# Patient Record
Sex: MALE | Race: WHITE | NOT HISPANIC OR LATINO
[De-identification: names, ages, dates, MRNs, and addresses within clinical notes are randomized per-mention and may not be internally consistent; named-entity substitution may affect disease eponyms.]

---

## 2019-08-13 PROBLEM — Z00.00 ENCOUNTER FOR PREVENTIVE HEALTH EXAMINATION: Status: ACTIVE | Noted: 2019-08-13

## 2019-08-20 ENCOUNTER — APPOINTMENT (OUTPATIENT)
Dept: OTOLARYNGOLOGY | Facility: CLINIC | Age: 74
End: 2019-08-20
Payer: MEDICARE

## 2019-08-20 VITALS
WEIGHT: 200 LBS | RESPIRATION RATE: 16 BRPM | SYSTOLIC BLOOD PRESSURE: 135 MMHG | DIASTOLIC BLOOD PRESSURE: 77 MMHG | HEART RATE: 97 BPM | HEIGHT: 70 IN | BODY MASS INDEX: 28.63 KG/M2

## 2019-08-20 DIAGNOSIS — Z80.0 FAMILY HISTORY OF MALIGNANT NEOPLASM OF DIGESTIVE ORGANS: ICD-10-CM

## 2019-08-20 DIAGNOSIS — Z80.1 FAMILY HISTORY OF MALIGNANT NEOPLASM OF TRACHEA, BRONCHUS AND LUNG: ICD-10-CM

## 2019-08-20 DIAGNOSIS — R01.1 CARDIAC MURMUR, UNSPECIFIED: ICD-10-CM

## 2019-08-20 DIAGNOSIS — I10 ESSENTIAL (PRIMARY) HYPERTENSION: ICD-10-CM

## 2019-08-20 DIAGNOSIS — J38.01 PARALYSIS OF VOCAL CORDS AND LARYNX, UNILATERAL: ICD-10-CM

## 2019-08-20 DIAGNOSIS — Z78.9 OTHER SPECIFIED HEALTH STATUS: ICD-10-CM

## 2019-08-20 DIAGNOSIS — E78.00 PURE HYPERCHOLESTEROLEMIA, UNSPECIFIED: ICD-10-CM

## 2019-08-20 PROCEDURE — 99204 OFFICE O/P NEW MOD 45 MIN: CPT | Mod: 25

## 2019-08-20 PROCEDURE — 31575 DIAGNOSTIC LARYNGOSCOPY: CPT

## 2019-08-20 NOTE — HISTORY OF PRESENT ILLNESS
[None] : The patient is currently asymptomatic. [de-identified] : Mr. Landon presents reporting increased reflux around April 2019.  He reports that he started getting hoarse since.  After waiting for 4 months and realizing that there were no improvement, he saw Dr. Pipo Leon.  He later reported increased mucus production and difficulty breathing with increased humidity.  He also reports some streaks of blood on his sputum.  He reports that under endoscopic evaluation, he was found that one of his vocal cords was not moving well.  A CT was ordered on October 6, 109 which noted a 2.8cm transglottic mass around the right larynx.  He denies ever smoking but reports second hand smoking.  He notes a soreness in his throat, denies dysphagia, some shortness of breath with humidity.  Pt here for evaluation. is suggestion of extralaryngeal disease ext on CT  is noting increase resp difficulty.  Pt states no issue w eating.  pt is a never smoker w long hx GERD.  no hx heasrt diseae but ?? valve issue. is able to sleep 6 hours.   [Neck Mass] : no neck mass [Difficulty Swallowing] : no difficulty swallowing [Painful Swallowing] : no painful swallowing

## 2019-08-20 NOTE — CONSULT LETTER
[Dear  ___] : Dear  [unfilled], [Consult Letter:] : I had the pleasure of evaluating your patient, [unfilled]. [Please see my note below.] : Please see my note below. [Consult Closing:] : Thank you very much for allowing me to participate in the care of this patient.  If you have any questions, please do not hesitate to contact me. [Sincerely,] : Sincerely, [FreeTextEntry3] : Kel Johnston MD [FreeTextEntry2] : Pipo Leon MD ([place], [place])\par

## 2019-08-20 NOTE — PHYSICAL EXAM
[FreeTextEntry1] : ant R midline neck full and sl tender.  no skin change [Midline] : trachea located in midline position [Normal] : no rashes

## 2019-08-20 NOTE — PROCEDURE
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  Pt has marginal 3-4mm airway but adequate for now. the tumor sems to cross the AC. He has mildly noisy breathing

## 2019-08-21 ENCOUNTER — FORM ENCOUNTER (OUTPATIENT)
Age: 74
End: 2019-08-21

## 2019-08-21 ENCOUNTER — CHART COPY (OUTPATIENT)
Age: 74
End: 2019-08-21

## 2019-08-22 ENCOUNTER — APPOINTMENT (OUTPATIENT)
Dept: NUCLEAR MEDICINE | Facility: IMAGING CENTER | Age: 74
End: 2019-08-22
Payer: MEDICARE

## 2019-08-22 ENCOUNTER — OUTPATIENT (OUTPATIENT)
Dept: OUTPATIENT SERVICES | Facility: HOSPITAL | Age: 74
LOS: 1 days | End: 2019-08-22
Payer: MEDICARE

## 2019-08-22 ENCOUNTER — FORM ENCOUNTER (OUTPATIENT)
Age: 74
End: 2019-08-22

## 2019-08-22 DIAGNOSIS — C32.9 MALIGNANT NEOPLASM OF LARYNX, UNSPECIFIED: ICD-10-CM

## 2019-08-22 PROCEDURE — 78815 PET IMAGE W/CT SKULL-THIGH: CPT | Mod: 26,PI

## 2019-08-22 PROCEDURE — A9552: CPT

## 2019-08-22 PROCEDURE — 78815 PET IMAGE W/CT SKULL-THIGH: CPT

## 2019-08-23 ENCOUNTER — OUTPATIENT (OUTPATIENT)
Dept: OUTPATIENT SERVICES | Facility: HOSPITAL | Age: 74
LOS: 1 days | End: 2019-08-23
Payer: MEDICARE

## 2019-08-23 ENCOUNTER — APPOINTMENT (OUTPATIENT)
Dept: MRI IMAGING | Facility: IMAGING CENTER | Age: 74
End: 2019-08-23
Payer: MEDICARE

## 2019-08-23 DIAGNOSIS — C32.9 MALIGNANT NEOPLASM OF LARYNX, UNSPECIFIED: ICD-10-CM

## 2019-08-23 PROCEDURE — 70543 MRI ORBT/FAC/NCK W/O &W/DYE: CPT | Mod: 26

## 2019-08-23 PROCEDURE — 70543 MRI ORBT/FAC/NCK W/O &W/DYE: CPT

## 2019-08-23 PROCEDURE — A9585: CPT

## 2019-08-25 ENCOUNTER — TRANSCRIPTION ENCOUNTER (OUTPATIENT)
Age: 74
End: 2019-08-25

## 2019-08-26 ENCOUNTER — OUTPATIENT (OUTPATIENT)
Dept: OUTPATIENT SERVICES | Facility: HOSPITAL | Age: 74
LOS: 1 days | Discharge: HOME | End: 2019-08-26

## 2019-08-26 ENCOUNTER — FORM ENCOUNTER (OUTPATIENT)
Age: 74
End: 2019-08-26

## 2019-08-27 ENCOUNTER — OTHER (OUTPATIENT)
Age: 74
End: 2019-08-27

## 2019-08-27 ENCOUNTER — RESULT REVIEW (OUTPATIENT)
Age: 74
End: 2019-08-27

## 2019-08-27 ENCOUNTER — APPOINTMENT (OUTPATIENT)
Dept: ULTRASOUND IMAGING | Facility: IMAGING CENTER | Age: 74
End: 2019-08-27
Payer: MEDICARE

## 2019-08-27 ENCOUNTER — OUTPATIENT (OUTPATIENT)
Dept: OUTPATIENT SERVICES | Facility: HOSPITAL | Age: 74
LOS: 1 days | End: 2019-08-27
Payer: MEDICARE

## 2019-08-27 ENCOUNTER — APPOINTMENT (OUTPATIENT)
Dept: RADIATION ONCOLOGY | Facility: CLINIC | Age: 74
End: 2019-08-27
Payer: MEDICARE

## 2019-08-27 VITALS
SYSTOLIC BLOOD PRESSURE: 158 MMHG | WEIGHT: 198.97 LBS | RESPIRATION RATE: 16 BRPM | BODY MASS INDEX: 28.48 KG/M2 | TEMPERATURE: 98.06 F | DIASTOLIC BLOOD PRESSURE: 84 MMHG | OXYGEN SATURATION: 98 % | HEART RATE: 102 BPM | HEIGHT: 70 IN

## 2019-08-27 DIAGNOSIS — C32.9 MALIGNANT NEOPLASM OF LARYNX, UNSPECIFIED: ICD-10-CM

## 2019-08-27 DIAGNOSIS — Z80.41 FAMILY HISTORY OF MALIGNANT NEOPLASM OF OVARY: ICD-10-CM

## 2019-08-27 PROCEDURE — 10006 FNA BX W/US GDN EA ADDL: CPT

## 2019-08-27 PROCEDURE — 88173 CYTOPATH EVAL FNA REPORT: CPT

## 2019-08-27 PROCEDURE — 88172 CYTP DX EVAL FNA 1ST EA SITE: CPT

## 2019-08-27 PROCEDURE — 88305 TISSUE EXAM BY PATHOLOGIST: CPT

## 2019-08-27 PROCEDURE — 88173 CYTOPATH EVAL FNA REPORT: CPT | Mod: 26

## 2019-08-27 PROCEDURE — 99204 OFFICE O/P NEW MOD 45 MIN: CPT | Mod: 25,GC

## 2019-08-27 PROCEDURE — 10005 FNA BX W/US GDN 1ST LES: CPT

## 2019-08-27 PROCEDURE — 88305 TISSUE EXAM BY PATHOLOGIST: CPT | Mod: 26

## 2019-08-27 NOTE — VITALS
[Maximal Pain Intensity: 2/10] : 2/10 [Least Pain Intensity: 0/10] : 0/10 [Pain Location: ___] : Pain Location: [unfilled] [NoTreatment Scheduled] : no treatment scheduled [80: Normal activity with effort; some signs or symptoms of disease.] : 80: Normal activity with effort; some signs or symptoms of disease.  [ECOG Performance Status: 1 - Restricted in physically strenuous activity but ambulatory and able to carry out work of a light or sedentary nature] : Performance Status: 1 - Restricted in physically strenuous activity but ambulatory and able to carry out work of a light or sedentary nature, e.g., light house work, office work

## 2019-08-28 ENCOUNTER — OUTPATIENT (OUTPATIENT)
Dept: OUTPATIENT SERVICES | Facility: HOSPITAL | Age: 74
LOS: 1 days | End: 2019-08-28
Payer: MEDICARE

## 2019-08-28 VITALS
DIASTOLIC BLOOD PRESSURE: 84 MMHG | TEMPERATURE: 99 F | HEART RATE: 96 BPM | RESPIRATION RATE: 18 BRPM | WEIGHT: 199.96 LBS | SYSTOLIC BLOOD PRESSURE: 130 MMHG | HEIGHT: 70 IN

## 2019-08-28 DIAGNOSIS — C32.9 MALIGNANT NEOPLASM OF LARYNX, UNSPECIFIED: ICD-10-CM

## 2019-08-28 LAB
ALBUMIN SERPL ELPH-MCNC: 4.3 G/DL — SIGNIFICANT CHANGE UP (ref 3.3–5)
ALP SERPL-CCNC: 85 U/L — SIGNIFICANT CHANGE UP (ref 40–120)
ALT FLD-CCNC: 9 U/L — SIGNIFICANT CHANGE UP (ref 4–41)
ANION GAP SERPL CALC-SCNC: 13 MMO/L — SIGNIFICANT CHANGE UP (ref 7–14)
AST SERPL-CCNC: 20 U/L — SIGNIFICANT CHANGE UP (ref 4–40)
BILIRUB SERPL-MCNC: 0.5 MG/DL — SIGNIFICANT CHANGE UP (ref 0.2–1.2)
BLD GP AB SCN SERPL QL: NEGATIVE — SIGNIFICANT CHANGE UP
BUN SERPL-MCNC: 18 MG/DL — SIGNIFICANT CHANGE UP (ref 7–23)
CALCIUM SERPL-MCNC: 10.2 MG/DL — SIGNIFICANT CHANGE UP (ref 8.4–10.5)
CHLORIDE SERPL-SCNC: 95 MMOL/L — LOW (ref 98–107)
CO2 SERPL-SCNC: 32 MMOL/L — HIGH (ref 22–31)
CREAT SERPL-MCNC: 0.98 MG/DL — SIGNIFICANT CHANGE UP (ref 0.5–1.3)
GLUCOSE SERPL-MCNC: 91 MG/DL — SIGNIFICANT CHANGE UP (ref 70–99)
HCT VFR BLD CALC: 45.5 % — SIGNIFICANT CHANGE UP (ref 39–50)
HGB BLD-MCNC: 14.7 G/DL — SIGNIFICANT CHANGE UP (ref 13–17)
MCHC RBC-ENTMCNC: 28.7 PG — SIGNIFICANT CHANGE UP (ref 27–34)
MCHC RBC-ENTMCNC: 32.3 % — SIGNIFICANT CHANGE UP (ref 32–36)
MCV RBC AUTO: 88.7 FL — SIGNIFICANT CHANGE UP (ref 80–100)
NON-GYNECOLOGICAL CYTOLOGY STUDY: SIGNIFICANT CHANGE UP
NON-GYNECOLOGICAL CYTOLOGY STUDY: SIGNIFICANT CHANGE UP
NRBC # FLD: 0 K/UL — SIGNIFICANT CHANGE UP (ref 0–0)
PLATELET # BLD AUTO: 311 K/UL — SIGNIFICANT CHANGE UP (ref 150–400)
PMV BLD: 10 FL — SIGNIFICANT CHANGE UP (ref 7–13)
POTASSIUM SERPL-MCNC: 3.8 MMOL/L — SIGNIFICANT CHANGE UP (ref 3.5–5.3)
POTASSIUM SERPL-SCNC: 3.8 MMOL/L — SIGNIFICANT CHANGE UP (ref 3.5–5.3)
PROT SERPL-MCNC: 7.9 G/DL — SIGNIFICANT CHANGE UP (ref 6–8.3)
RBC # BLD: 5.13 M/UL — SIGNIFICANT CHANGE UP (ref 4.2–5.8)
RBC # FLD: 12.1 % — SIGNIFICANT CHANGE UP (ref 10.3–14.5)
RH IG SCN BLD-IMP: NEGATIVE — SIGNIFICANT CHANGE UP
SODIUM SERPL-SCNC: 140 MMOL/L — SIGNIFICANT CHANGE UP (ref 135–145)
WBC # BLD: 7.54 K/UL — SIGNIFICANT CHANGE UP (ref 3.8–10.5)
WBC # FLD AUTO: 7.54 K/UL — SIGNIFICANT CHANGE UP (ref 3.8–10.5)

## 2019-08-28 PROCEDURE — 93010 ELECTROCARDIOGRAM REPORT: CPT

## 2019-08-28 NOTE — H&P PST ADULT - NSANTHOSAYNRD_GEN_A_CORE
No. ADRIANNE screening performed.  STOP BANG Legend: 0-2 = LOW Risk; 3-4 = INTERMEDIATE Risk; 5-8 = HIGH Risk

## 2019-08-28 NOTE — H&P PST ADULT - NSICDXFAMILYHX_GEN_ALL_CORE_FT
FAMILY HISTORY:  Family history of gynecological problem, cancer-sister  FH: colon cancer, brother  FH: lung cancer, father

## 2019-08-28 NOTE — H&P PST ADULT - NSICDXPASTMEDICALHX_GEN_ALL_CORE_FT
PAST MEDICAL HISTORY:  HTN (hypertension)     Hyperlipidemia, mild     Infectious hepatitis 1958    Laryngeal cancer

## 2019-08-28 NOTE — H&P PST ADULT - ACTIVITY
utilizes stairs in the home daily, pt. has periodic SOB with climbing stairs since throat abnormality

## 2019-08-28 NOTE — H&P PST ADULT - HISTORY OF PRESENT ILLNESS
Pt. is a 75 yo male that had laryngitis April 2019.  The hoarseness worsened.  Pt. had diagnostic testing and biopsy revealed laryngeal cancer.

## 2019-08-28 NOTE — H&P PST ADULT - PSYCHIATRIC
This writer attempted to contact Albertina on 08/23/17      Reason for call schedule appt and left message to return call.      When patient calls back, please schedule Office Visit appointment within 1 month with PCP, document that pt called and close encounter .          Selene Ruiz MA     negative Affect and characteristics of appearance, verbalizations, behaviors are appropriate

## 2019-08-28 NOTE — H&P PST ADULT - NSICDXPROBLEM_GEN_ALL_CORE_FT
PROBLEM DIAGNOSES  Problem: Laryngeal cancer  Assessment and Plan: Pt. is scheduled for a total laryngectomy without dissection, STACEY neck dissection, total thyroidectomy...9/5/19.  Pt. verbalized understanding of instructions as discussed and outlined on the instruction sheets.  Pt. did teach back on Chlorhexidine wash.  Pt. to have medical clearance 9/3/19 as requested by the Surgeon.  Will request last cardiac note. PROBLEM DIAGNOSES  Problem: Laryngeal cancer  Assessment and Plan: Pt. is scheduled for a total laryngectomy without dissection, STACEY neck dissection, total thyroidectomy...9/5/19.  Pt. verbalized understanding of instructions as discussed and outlined on the instruction sheets.  Pt. did teach back on Chlorhexidine wash.  Pt. to have medical clearance 9/3/19 as requested by the Surgeon.  Has BAL.  Will request last cardiac note.

## 2019-08-28 NOTE — H&P PST ADULT - ASSESSMENT
Left message for patient to call back at [phone].     Pt. is a 75 yo male accompanied by his son.  Pt. has laryngeal cancer.

## 2019-08-29 PROBLEM — C32.9 MALIGNANT NEOPLASM OF LARYNX, UNSPECIFIED: Chronic | Status: ACTIVE | Noted: 2019-08-28

## 2019-08-29 PROBLEM — I10 ESSENTIAL (PRIMARY) HYPERTENSION: Chronic | Status: ACTIVE | Noted: 2019-08-28

## 2019-08-29 PROBLEM — E78.5 HYPERLIPIDEMIA, UNSPECIFIED: Chronic | Status: ACTIVE | Noted: 2019-08-28

## 2019-08-29 PROBLEM — B15.9 HEPATITIS A WITHOUT HEPATIC COMA: Chronic | Status: ACTIVE | Noted: 2019-08-28

## 2019-08-30 ENCOUNTER — APPOINTMENT (OUTPATIENT)
Dept: OTOLARYNGOLOGY | Facility: CLINIC | Age: 74
End: 2019-08-30
Payer: MEDICARE

## 2019-08-30 ENCOUNTER — APPOINTMENT (OUTPATIENT)
Dept: SPEECH THERAPY | Facility: CLINIC | Age: 74
End: 2019-08-30
Payer: MEDICARE

## 2019-08-30 VITALS
DIASTOLIC BLOOD PRESSURE: 80 MMHG | BODY MASS INDEX: 28.35 KG/M2 | SYSTOLIC BLOOD PRESSURE: 156 MMHG | HEIGHT: 70 IN | WEIGHT: 198 LBS | HEART RATE: 105 BPM

## 2019-08-30 PROCEDURE — 99214 OFFICE O/P EST MOD 30 MIN: CPT | Mod: 25

## 2019-08-30 PROCEDURE — 92524 BEHAVRAL QUALIT ANALYS VOICE: CPT | Mod: NC,GN

## 2019-08-30 PROCEDURE — 31575 DIAGNOSTIC LARYNGOSCOPY: CPT

## 2019-08-30 NOTE — PHYSICAL EXAM
[Sclera] : the sclera and conjunctiva were normal [Extraocular Movements] : extraocular movements were intact [Outer Ear] : the ears and nose were normal in appearance [Hearing Threshold Finger Rub Not Gentry] : hearing was normal [Examination Of The Oral Cavity] : the lips and gums were normal [Exaggerated Use Of Accessory Muscles For Inspiration] : no accessory muscle use [Cervical Lymph Nodes Enlarged Posterior Bilaterally] : posterior cervical [Cervical Lymph Nodes Enlarged Anterior Bilaterally] : anterior cervical [Supraclavicular Lymph Nodes Enlarged Bilaterally] : supraclavicular [Axillary Lymph Nodes Enlarged Bilaterally] : axillary [Normal] : oriented to person, place and time, the affect was normal, the mood was normal and not anxious [de-identified] : expiratory stridor

## 2019-08-30 NOTE — HISTORY OF PRESENT ILLNESS
[FreeTextEntry1] : 74 year old pt w large R transglottic tumor w partial airway obx and likely extralaryngeal spread.\par \par On 8/22/19 PET showed\par \par 1. Large hypermetabolic transglottic mass centered in right larynx with \par extension across the midline. There is extralaryngeal extension involving \par the bilateral strap muscles. \par \par 2. Asymmetric hypermetabolism in cricoarytenoid region, left greater than \par right, is indeterminate, possibly physiologic. \par \par 3 Small hypermetabolic lymph nodes in right level III and IV cervical lymph \par nodes are compatible with metastatic disease. A tiny left level III lymph \par node is indeterminate. Further evaluation may be performed with ultrasound \par guided percutaneous needle biopsy. \par \par 4. No evidence of distant disease. \par \par Father had lung cancer.  Sister had ovarian cancer. Brother had lung cancer.  The patient is a never smoker and only social drinker. \par \par Hoarseness progressively worse since April.  \par

## 2019-08-30 NOTE — END OF VISIT
[] : Resident [FreeTextEntry3] : Agree with assessment and plan. Recommended surgery followed by adjuvant RT/CRT. Procedure and side effects explained in detail. Side effects include but not limited to radiation necrosis, PEG dependence, skin and cartilage damage, dry mouth, loss of taste.  [>50% of Time Spent on Counseling and Coordination of Care for  ___] : Greater than 50% of the encounter time was spent on counseling and coordination of care for [unfilled] [Time Spent: ___ minutes] : I have spent [unfilled] minutes of face to face time with the patient

## 2019-09-01 NOTE — PHYSICAL EXAM
[Midline] : trachea located in midline position [Normal] : no rashes [de-identified] : Anterior and R lateral neck bruising from FNA.

## 2019-09-01 NOTE — HISTORY OF PRESENT ILLNESS
[de-identified] : pt w large R transglottic tumor w partial airway obx and extralaryngeal spread. Here for consultation prior surgery. pt already had all the necessary work up and surgery is schedule on 9/5/2019. PET CT did not show signs of distant metastatic disease. R neck node FNA was positive for cancer.

## 2019-09-01 NOTE — DATA REVIEWED
[de-identified] : There is an enhancing 4.4 x 4 x 4.6 cm, (AP, TR, CC) mass involving the right aryepiglottic fold, right piriform sinus, right greater than left false and true cords, with extra lateral extension into the anterior strap muscles and thyroid with 1.1 cm subglottic extension concerning neoplasm as detailed above. \par Lymph nodes in III cervical chains, although nodes are less than 1.5 cm is in short axis, the nodes are slightly clustered and heterogeneous and enhancement, metastatic adenopathy is not excluded, please see PET/CT for full description of scottie findings. [de-identified] : pet ct\par 1. Large hypermetabolic transglottic mass centered in right larynx with extension across the midline. There is extralaryngeal extension involving the bilateral strap muscles. \par 2. Asymmetric hypermetabolism in cricoarytenoid region, left greater than right, is indeterminate, possibly physiologic. \par 3 Small hypermetabolic lymph nodes in right level III and IV cervical lymph nodes are compatible with metastatic disease. A tiny left level III lymph node is indeterminate. Further evaluation may be performed with ultrasound guided percutaneous needle biopsy. \par 4. No evidence of distant disease.

## 2019-09-03 ENCOUNTER — APPOINTMENT (OUTPATIENT)
Dept: OTOLARYNGOLOGY | Facility: CLINIC | Age: 74
End: 2019-09-03
Payer: MEDICARE

## 2019-09-03 VITALS
BODY MASS INDEX: 28.35 KG/M2 | WEIGHT: 198 LBS | SYSTOLIC BLOOD PRESSURE: 151 MMHG | DIASTOLIC BLOOD PRESSURE: 75 MMHG | HEART RATE: 88 BPM | HEIGHT: 70 IN | RESPIRATION RATE: 16 BRPM

## 2019-09-03 PROCEDURE — 99214 OFFICE O/P EST MOD 30 MIN: CPT

## 2019-09-03 NOTE — REVIEW OF SYSTEMS
[Hoarseness] : hoarseness [Throat Clearing] : throat clearing [As Noted in HPI] : as noted in HPI [Negative] : Heme/Lymph

## 2019-09-03 NOTE — CONSULT LETTER
[Dear  ___] : Dear  [unfilled], [Courtesy Letter:] : I had the pleasure of seeing your patient, [unfilled], in my office today. [Please see my note below.] : Please see my note below. [FreeTextEntry2] : Pipo Leon MD ([place], [place]) [Sincerely,] : Sincerely, [FreeTextEntry3] : Kel Johnston MD

## 2019-09-03 NOTE — HISTORY OF PRESENT ILLNESS
[None] : The patient is currently asymptomatic. [Difficulty Swallowing] : difficulty swallowing [de-identified] : Mr. Landon presents for pre-surgical consultation for large right transglottic tumor with partial airway obstruction.  He is doing well and is scheduled for surgery in the next few days.   [Neck Mass] : no neck mass [Painful Swallowing] : no painful swallowing

## 2019-09-04 ENCOUNTER — TRANSCRIPTION ENCOUNTER (OUTPATIENT)
Age: 74
End: 2019-09-04

## 2019-09-04 ENCOUNTER — INPATIENT (INPATIENT)
Facility: HOSPITAL | Age: 74
LOS: 12 days | Discharge: HOME HEALTH | End: 2019-09-17
Attending: OTOLARYNGOLOGY | Admitting: OTOLARYNGOLOGY
Payer: MEDICARE

## 2019-09-04 VITALS
DIASTOLIC BLOOD PRESSURE: 85 MMHG | SYSTOLIC BLOOD PRESSURE: 150 MMHG | RESPIRATION RATE: 18 BRPM | TEMPERATURE: 99 F | HEART RATE: 85 BPM | OXYGEN SATURATION: 98 %

## 2019-09-04 DIAGNOSIS — C32.9 MALIGNANT NEOPLASM OF LARYNX, UNSPECIFIED: ICD-10-CM

## 2019-09-04 LAB
ALBUMIN SERPL ELPH-MCNC: 4.2 G/DL — SIGNIFICANT CHANGE UP (ref 3.3–5)
ALP SERPL-CCNC: 79 U/L — SIGNIFICANT CHANGE UP (ref 40–120)
ALT FLD-CCNC: 9 U/L — SIGNIFICANT CHANGE UP (ref 4–41)
ANION GAP SERPL CALC-SCNC: 10 MMO/L — SIGNIFICANT CHANGE UP (ref 7–14)
APTT BLD: 39.9 SEC — HIGH (ref 27.5–36.3)
AST SERPL-CCNC: 20 U/L — SIGNIFICANT CHANGE UP (ref 4–40)
BASE EXCESS BLDV CALC-SCNC: 8.5 MMOL/L — SIGNIFICANT CHANGE UP
BASOPHILS # BLD AUTO: 0.05 K/UL — SIGNIFICANT CHANGE UP (ref 0–0.2)
BASOPHILS NFR BLD AUTO: 0.7 % — SIGNIFICANT CHANGE UP (ref 0–2)
BILIRUB SERPL-MCNC: 0.4 MG/DL — SIGNIFICANT CHANGE UP (ref 0.2–1.2)
BLD GP AB SCN SERPL QL: NEGATIVE — SIGNIFICANT CHANGE UP
BLOOD GAS VENOUS - CREATININE: SIGNIFICANT CHANGE UP MG/DL (ref 0.5–1.3)
BLOOD GAS VENOUS - FIO2: 21 — SIGNIFICANT CHANGE UP
BUN SERPL-MCNC: 13 MG/DL — SIGNIFICANT CHANGE UP (ref 7–23)
CALCIUM SERPL-MCNC: 9.5 MG/DL — SIGNIFICANT CHANGE UP (ref 8.4–10.5)
CHLORIDE BLDV-SCNC: 101 MMOL/L — SIGNIFICANT CHANGE UP (ref 96–108)
CHLORIDE SERPL-SCNC: 100 MMOL/L — SIGNIFICANT CHANGE UP (ref 98–107)
CO2 SERPL-SCNC: 31 MMOL/L — SIGNIFICANT CHANGE UP (ref 22–31)
CREAT SERPL-MCNC: 0.83 MG/DL — SIGNIFICANT CHANGE UP (ref 0.5–1.3)
EOSINOPHIL # BLD AUTO: 0.08 K/UL — SIGNIFICANT CHANGE UP (ref 0–0.5)
EOSINOPHIL NFR BLD AUTO: 1.1 % — SIGNIFICANT CHANGE UP (ref 0–6)
GAS PNL BLDV: 135 MMOL/L — LOW (ref 136–146)
GLUCOSE BLDV-MCNC: 122 MG/DL — HIGH (ref 70–99)
GLUCOSE SERPL-MCNC: 131 MG/DL — HIGH (ref 70–99)
HCO3 BLDV-SCNC: 30 MMOL/L — HIGH (ref 20–27)
HCT VFR BLD CALC: 43.7 % — SIGNIFICANT CHANGE UP (ref 39–50)
HCT VFR BLDV CALC: 45.6 % — SIGNIFICANT CHANGE UP (ref 39–51)
HGB BLD-MCNC: 14.1 G/DL — SIGNIFICANT CHANGE UP (ref 13–17)
HGB BLDV-MCNC: 14.9 G/DL — SIGNIFICANT CHANGE UP (ref 13–17)
IMM GRANULOCYTES NFR BLD AUTO: 0.4 % — SIGNIFICANT CHANGE UP (ref 0–1.5)
INR BLD: 1.14 — SIGNIFICANT CHANGE UP (ref 0.88–1.17)
LACTATE BLDV-MCNC: 1.1 MMOL/L — SIGNIFICANT CHANGE UP (ref 0.5–2)
LYMPHOCYTES # BLD AUTO: 1.19 K/UL — SIGNIFICANT CHANGE UP (ref 1–3.3)
LYMPHOCYTES # BLD AUTO: 16.1 % — SIGNIFICANT CHANGE UP (ref 13–44)
MCHC RBC-ENTMCNC: 28.8 PG — SIGNIFICANT CHANGE UP (ref 27–34)
MCHC RBC-ENTMCNC: 32.3 % — SIGNIFICANT CHANGE UP (ref 32–36)
MCV RBC AUTO: 89.4 FL — SIGNIFICANT CHANGE UP (ref 80–100)
MONOCYTES # BLD AUTO: 0.61 K/UL — SIGNIFICANT CHANGE UP (ref 0–0.9)
MONOCYTES NFR BLD AUTO: 8.2 % — SIGNIFICANT CHANGE UP (ref 2–14)
NEUTROPHILS # BLD AUTO: 5.45 K/UL — SIGNIFICANT CHANGE UP (ref 1.8–7.4)
NEUTROPHILS NFR BLD AUTO: 73.5 % — SIGNIFICANT CHANGE UP (ref 43–77)
NRBC # FLD: 0.02 K/UL — SIGNIFICANT CHANGE UP (ref 0–0)
PCO2 BLDV: 61 MMHG — HIGH (ref 41–51)
PH BLDV: 7.36 PH — SIGNIFICANT CHANGE UP (ref 7.32–7.43)
PLATELET # BLD AUTO: 264 K/UL — SIGNIFICANT CHANGE UP (ref 150–400)
PMV BLD: 9.1 FL — SIGNIFICANT CHANGE UP (ref 7–13)
PO2 BLDV: 31 MMHG — LOW (ref 35–40)
POTASSIUM BLDV-SCNC: 3.8 MMOL/L — SIGNIFICANT CHANGE UP (ref 3.4–4.5)
POTASSIUM SERPL-MCNC: 4 MMOL/L — SIGNIFICANT CHANGE UP (ref 3.5–5.3)
POTASSIUM SERPL-SCNC: 4 MMOL/L — SIGNIFICANT CHANGE UP (ref 3.5–5.3)
PROT SERPL-MCNC: 7.9 G/DL — SIGNIFICANT CHANGE UP (ref 6–8.3)
PROTHROM AB SERPL-ACNC: 13 SEC — SIGNIFICANT CHANGE UP (ref 9.8–13.1)
RBC # BLD: 4.89 M/UL — SIGNIFICANT CHANGE UP (ref 4.2–5.8)
RBC # FLD: 12.2 % — SIGNIFICANT CHANGE UP (ref 10.3–14.5)
RH IG SCN BLD-IMP: NEGATIVE — SIGNIFICANT CHANGE UP
SAO2 % BLDV: 59.7 % — LOW (ref 60–85)
SODIUM SERPL-SCNC: 141 MMOL/L — SIGNIFICANT CHANGE UP (ref 135–145)
WBC # BLD: 7.41 K/UL — SIGNIFICANT CHANGE UP (ref 3.8–10.5)
WBC # FLD AUTO: 7.41 K/UL — SIGNIFICANT CHANGE UP (ref 3.8–10.5)

## 2019-09-04 PROCEDURE — 99223 1ST HOSP IP/OBS HIGH 75: CPT | Mod: 25

## 2019-09-04 PROCEDURE — 99223 1ST HOSP IP/OBS HIGH 75: CPT

## 2019-09-04 RX ORDER — DEXAMETHASONE 0.5 MG/5ML
10 ELIXIR ORAL EVERY 8 HOURS
Refills: 0 | Status: DISCONTINUED | OUTPATIENT
Start: 2019-09-04 | End: 2019-09-05

## 2019-09-04 RX ORDER — SODIUM CHLORIDE 9 MG/ML
1000 INJECTION, SOLUTION INTRAVENOUS
Refills: 0 | Status: DISCONTINUED | OUTPATIENT
Start: 2019-09-04 | End: 2019-09-05

## 2019-09-04 RX ORDER — DEXAMETHASONE 0.5 MG/5ML
10 ELIXIR ORAL ONCE
Refills: 0 | Status: DISCONTINUED | OUTPATIENT
Start: 2019-09-04 | End: 2019-09-04

## 2019-09-04 RX ORDER — DEXAMETHASONE 0.5 MG/5ML
10 ELIXIR ORAL ONCE
Refills: 0 | Status: COMPLETED | OUTPATIENT
Start: 2019-09-04 | End: 2019-09-04

## 2019-09-04 RX ORDER — SODIUM CHLORIDE 9 MG/ML
1000 INJECTION, SOLUTION INTRAVENOUS ONCE
Refills: 0 | Status: COMPLETED | OUTPATIENT
Start: 2019-09-04 | End: 2019-09-04

## 2019-09-04 RX ADMIN — Medication 102 MILLIGRAM(S): at 10:05

## 2019-09-04 RX ADMIN — SODIUM CHLORIDE 100 MILLILITER(S): 9 INJECTION, SOLUTION INTRAVENOUS at 16:24

## 2019-09-04 RX ADMIN — Medication 102 MILLIGRAM(S): at 23:05

## 2019-09-04 RX ADMIN — SODIUM CHLORIDE 1000 MILLILITER(S): 9 INJECTION, SOLUTION INTRAVENOUS at 13:19

## 2019-09-04 NOTE — H&P ADULT - ASSESSMENT
A/P: 74M with T4 laryngeal carcinoma who presents with acute shortness of breath. He reports feeling better in the ER.  -recommend admission to SICU for observation  -decadron 10mg q8  -NPO/IVF  -respiratory monitoring, continuous pulse oximetry  -HOB elevation  -plan for total laryngectomy, bilateral neck dissection, possible free flap tomorrow with Dr. Johnston  -discussed with Dr. Moncada and Dr. Johnston  -please page with questions

## 2019-09-04 NOTE — ED ADULT NURSE REASSESSMENT NOTE - NS ED NURSE REASSESS COMMENT FT1
report received from Jas FLORES, pt breathing even & unlabored, VSS, no stridor noted, pt able to tolerate own secretions, able to complete full sentences, decadron running as per MD order, Report given to Amberly Ash0 RN, son at bedside, pt educated about NPO status, will continue to monitor.

## 2019-09-04 NOTE — CONSULT NOTE ADULT - ASSESSMENT
73 y/o man with HTN, HLD, laryngeal ca with extralaryngeal spread who p/w SOB, planned for total laryngectomy.    *Laryngeal ca: planned for laryngectomy  - RCRI 0 - 0.4% risk of marita-operative MACE  - medically optimized for surgery    *HTN: continue home medication regimen  - HCTZ 25 mg daily  - amlodipine 10 mg daily  - monitor BP    *HLD: continue statin 75 y/o man with HTN, HLD, carotid stenosis laryngeal ca with extralaryngeal spread who p/w SOB, planned for total laryngectomy.    *Laryngeal ca: planned for laryngectomy  - RCRI 0 - 0.4% risk of marita-operative MACE  - medically optimized for surgery    *HTN: continue home medication regimen  - HCTZ 25 mg daily  - amlodipine 10 mg daily  - monitor BP    *HLD: continue statin    *Carotid stenosis: continue aspirin as possible marita-operatively to reduce risk of stroke

## 2019-09-04 NOTE — CONSULT NOTE ADULT - ATTENDING COMMENTS
75yo M w laryngeal SCC, presenting to ED w SOB. currently stable, saturating 98% on RA.     PLAN:  Neuro  - no issues    ENT:  - for OR tomorrow    Resp  - continue to monitor respiratory distress  - we will continue to follow patient closely    CV/GI//Heme/Endo/ID:  - no active issues  The patient is a critical care patient with life threatening hemodynamic and metabolic instability in SICU.  I have personally interviewed when possible and examined the patient, reviewed data and laboratory tests/x-rays and all pertinent electronic images.  I was physically present for the key portions of the evaluation and management (E/M) service provided.   The SICU team has a constant risk benefit analyzes discussion with the primary team, all consultants, House Staff and PA's on all decisions.  These diagnoses are unrelated to the surgical procedure noted above.  I meet with family if needed to get further history, discuss the case and make care decisions for this patient who might not be able to participate.  Time involved in performance of separately billable procedures was not counted toward my critical care time. There is no overlap.  I spent 55-75 minutes ( 0800Hrs-0915Hrs in AM/ 1600Hrs-1715Hrs in PM, or other time indicated) of critical care time for the diagnoses, assessment, plan and interventions.  This time excludes time spent on separate procedures and teaching.

## 2019-09-04 NOTE — CONSULT NOTE ADULT - ATTENDING COMMENTS
73yo M w laryngeal SCC, presenting to ED w SOB. minimum stridor (unchanged) currently stable    PLAN:  Neuro  - no issues    ENT:  - for OR tomorrow    Resp  - continue to monitor respiratory distress  - we will continue to follow patient closely    CV/GI//Heme/Endo/ID:  - no active issues.  Dispo;D/C floor, we will follow with Zenaida  The patient is not a critical care patient with no life threatening hemodynamic and metabolic instability in SICU.  I have personally interviewed when possible and examined the patient, reviewed data and laboratory tests/x-rays and all pertinent electronic images.  I was physically present for the key portions of the evaluation and management (E/M) service provided.   The SICU team has a constant risk benefit analyzes discussion with the primary team, all consultants, House Staff and PA's on all decisions.  These diagnoses are unrelated to the surgical procedure noted above.  I meet with family if needed to get further history, discuss the case and make care decisions for this patient who might not be able to participate.  Time involved in performance of separately billable procedures was not counted toward my critical care time. There is no overlap.  I spent 55-75 minutes ( 0800Hrs-0915Hrs in AM/ 1600Hrs-1715Hrs in PM, or other time indicated) of critical care time for the diagnoses, assessment, plan and interventions.  This time excludes time spent on separate procedures and teaching.

## 2019-09-04 NOTE — H&P ADULT - NSHPPHYSICALEXAM_GEN_ALL_CORE
Exam  NAd, awake and alert  able to speak full sentences  has moderate inspiratory stridor at rest  no use of accessory muscles  PERRLa, EOMI  NC: clear anteriorly  OC/OP: tongue WNL, FOM soft/flat, OP clear  Neck: no obvious lymphadenopathy on palpation, mild tenderness of larynx    Scope exam - to be documented by Dr. Perla

## 2019-09-04 NOTE — CONSULT NOTE ADULT - ASSESSMENT
Assessment: 73yo M w laryngeal SCC, presenting to ED w SOB. currently stable    PLAN:  Neuro  - no issues    ENT:  - for OR tomorrow    Resp  - continue to monitor respiratory distress  - we will continue to follow patient closely    CV/GI//Heme/Endo/ID:  - no active issues

## 2019-09-04 NOTE — ED PROVIDER NOTE - OBJECTIVE STATEMENT
75 y/o male pmh laryngeal CA c/o sob x today. Pt is scheduled for laryngectomy tomorrow, 9/5, but woke up today feeling worsening sob. Pt states that he is sob with any exertion. Pt admits to having increased difficulty swallowing this morning but is tolerating secretions and is able to tolerate water. Pt denies chest pain, abd pain, n/v/d, numbness, tingling, weakness, dizziness, syncope, fever or chills.

## 2019-09-04 NOTE — PROGRESS NOTE ADULT - SUBJECTIVE AND OBJECTIVE BOX
Brief Note    Patient seen and examined at bedside. Breathing comfortably on room air, no increased work of breathing. Hoarse voice. Scope performed with Dr. oMncada present.    Scope Exam Stable from Prior 9/1/2019  NP wnl  BOT/vallecula normal  Epiglottis sharp  R supraglottic mass extending to R TVC with limited mobility, L TVC obstructed by R supraglottic mass, although appears mobile, mass partially obstructed with patency of airway noted posteriorly  Hypopharynx clear, no masses or lesions    See full H&P for assessment and plan

## 2019-09-04 NOTE — CONSULT NOTE ADULT - ASSESSMENT
Assessment: 75yo M w laryngeal SCC, presenting to ED w SOB. currently stable, saturating 98% on RA.     PLAN:  Neuro  - no issues    ENT:  - for OR tomorrow    Resp  - continue to monitor respiratory distress  - we will continue to follow patient closely    CV/GI//Heme/Endo/ID:  - no active issues

## 2019-09-04 NOTE — CONSULT NOTE ADULT - SUBJECTIVE AND OBJECTIVE BOX
Patient is a 74y old  Male who presents with a chief complaint of shortness of breath (04 Sep 2019 15:40)      HPI:  Patient is a 74 year old male with known T4 larynx cancer with extralaryngeal spread, HTN, HLD who presents to the ED with shortness of breath. He reports that he was sleeping this morning when he woke up around 5am with acute shortness of breath. His respiratory status is worse than last week but currently denies difficulty breathing when sitting in bed. He currently says he "feels ok". His son reports that he does become short of breath when he walks around. Reports he felt like some mucus was stuck in his throat and he had difficulty coughing it up. He reports that he otherwise has been eating food and drinking liquids ok. Denies pain. (04 Sep 2019 11:37)      History limited due to: [] Dementia  [] Delirium  [] Condition	    Function: [] Independent  [] Assistance  [] Total care  [] Non-ambulatory    Allergies    penicillin (Rash)    Intolerances        HOME MEDICATIONS: [] Reviewed    MEDICATIONS  (STANDING):  dexamethasone  IVPB 10 milliGRAM(s) IV Intermittent every 8 hours  dextrose 5% + sodium chloride 0.45%. 1000 milliLiter(s) (100 mL/Hr) IV Continuous <Continuous>    MEDICATIONS  (PRN):      PAST MEDICAL & SURGICAL HISTORY:  Laryngeal cancer  Hyperlipidemia, mild  HTN (hypertension)  Infectious hepatitis: 1958  No significant past surgical history  [ ] Reviewed     SOCIAL HISTORY:  Residence: [] [facility]  [] [facility]  [] Community  [] Substance abuse:   [] Tobacco:   [ ] Alcohol use:     FAMILY HISTORY:  Family history of gynecological problem: cancer-sister  FH: colon cancer: brother  FH: lung cancer: father  [] No pertinent family history in first degree relatives     REVIEW OF SYSTEMS:    CONSTITUTIONAL: No fever, no weight loss  ENMT:  No difficulty hearing, tinnitus, vertigo  NECK: No pain or stiffness  RESPIRATORY: No cough. No dyspnea. No wheezing.  CARDIOVASCULAR: No CP. No orthopnea.  GASTROINTESTINAL: No abdominal pain. No nausea. No vomiting.  GENITOURINARY: No dysuria  SKIN: No itching. No rash  ENDOCRINE: No heat or cold intolerance  MUSCULOSKELETAL: No muscle pain. No back pain  HEME/LYMPH: No easy bruising, or bleeding gums  ALLERGY AND IMMUNOLOGIC: No hives. No eczema    [] All other ROS negative  [] Unable to obtain due to poor mental status    Vital Signs Last 24 Hrs  T(C): 36.7 (04 Sep 2019 15:34), Max: 37 (04 Sep 2019 08:30)  T(F): 98 (04 Sep 2019 15:34), Max: 98.6 (04 Sep 2019 08:30)  HR: 101 (04 Sep 2019 15:34) (85 - 101)  BP: 151/60 (04 Sep 2019 15:34) (143/63 - 152/74)  BP(mean): --  RR: 18 (04 Sep 2019 15:34) (18 - 22)  SpO2: 99% (04 Sep 2019 15:34) (97% - 99%)    PHYSICAL EXAM:  GENERAL: NAD, well-developed  HEAD:  Atraumatic, Normocephalic  EYES: EOMI  NECK: Supple, No JVD  CHEST/LUNG: nonlabored breathing  HEART: nl S1/S2  ABDOMEN: nondistended, soft  EXTREMITIES:  no LE edema  PSYCH: alert, answering questions appropriately  NEUROLOGY: non-focal  SKIN: No rashes noted    LABS:                        14.1   7.41  )-----------( 264      ( 04 Sep 2019 08:53 )             43.7     09-04    141  |  100  |  13  ----------------------------<  131<H>  4.0   |  31  |  0.83    Ca    9.5      04 Sep 2019 08:53    TPro  7.9  /  Alb  4.2  /  TBili  0.4  /  DBili  x   /  AST  20  /  ALT  9   /  AlkPhos  79  09-04    PT/INR - ( 04 Sep 2019 08:55 )   PT: 13.0 SEC;   INR: 1.14          PTT - ( 04 Sep 2019 08:55 )  PTT:39.9 SEC    CAPILLARY BLOOD GLUCOSE          RADIOLOGY & ADDITIONAL STUDIES:    EKG:   Personally Reviewed:  [] YES     Imaging:   Personally Reviewed:  [] YES               Consultant(s) notes reviewed:      Care Discussed with Consultant(s)/Other Providers:    Advanced Directives: [] DNR  [] No feeding tube  [] MOLST in chart  [] MOLST completed today  [] Unknown        Ruel Man M.D.  Hospitalist  Pager [phone] Patient is a 74y old  Male who presents with a chief complaint of shortness of breath (04 Sep 2019 15:40)      HPI:  Patient is a 74 year old male with known T4 larynx cancer with extralaryngeal spread, HTN, HLD, carotid stenosis who presents to the ED with shortness of breath. He reports that he was sleeping this morning when he woke up around 5am with acute shortness of breath. His respiratory status is worse than last week but currently denies difficulty breathing when sitting in bed. He currently says he "feels ok". His son reports that he does become short of breath when he walks around. Reports he felt like some mucus was stuck in his throat and he had difficulty coughing it up. He reports that he otherwise has been eating food and drinking liquids ok. Denies pain. (04 Sep 2019 11:37).    Patient denies h/o heart disease, stroke, heart failure. Denies h/o smoking. Has chronic b/l ankle edema, denies PND, orthopnea, or significant BAL.      History limited due to: [] Dementia  [] Delirium  [] Condition	    Function: [] Independent  [] Assistance  [] Total care  [] Non-ambulatory    Allergies    penicillin (Rash)    Intolerances        HOME MEDICATIONS: [] Reviewed    MEDICATIONS  (STANDING):  dexamethasone  IVPB 10 milliGRAM(s) IV Intermittent every 8 hours  dextrose 5% + sodium chloride 0.45%. 1000 milliLiter(s) (100 mL/Hr) IV Continuous <Continuous>    MEDICATIONS  (PRN):      PAST MEDICAL & SURGICAL HISTORY:  Laryngeal cancer  Hyperlipidemia, mild  HTN (hypertension)  Infectious hepatitis: 1958  No significant past surgical history  [ ] Reviewed     SOCIAL HISTORY: Denies T/E/D  Residence: [] [facility]  [] SNF  [] Community  [] Substance abuse:   [] Tobacco:   [ ] Alcohol use:     FAMILY HISTORY:  Family history of gynecological problem: cancer-sister  FH: colon cancer: brother  FH: lung cancer: father  [] No pertinent family history in first degree relatives     REVIEW OF SYSTEMS:    CONSTITUTIONAL: No fever, no weight loss  ENMT:  No difficulty hearing, tinnitus, vertigo  NECK: No pain or stiffness  RESPIRATORY: No cough. No dyspnea. No wheezing.  CARDIOVASCULAR: No CP. No orthopnea.  GASTROINTESTINAL: No abdominal pain. No nausea. No vomiting.  GENITOURINARY: No dysuria  SKIN: No itching. No rash  ENDOCRINE: No heat or cold intolerance  MUSCULOSKELETAL: No muscle pain. No back pain  HEME/LYMPH: No easy bruising, or bleeding gums  ALLERGY AND IMMUNOLOGIC: No hives. No eczema    [] All other ROS negative  [] Unable to obtain due to poor mental status    Vital Signs Last 24 Hrs  T(C): 36.7 (04 Sep 2019 15:34), Max: 37 (04 Sep 2019 08:30)  T(F): 98 (04 Sep 2019 15:34), Max: 98.6 (04 Sep 2019 08:30)  HR: 101 (04 Sep 2019 15:34) (85 - 101)  BP: 151/60 (04 Sep 2019 15:34) (143/63 - 152/74)  BP(mean): --  RR: 18 (04 Sep 2019 15:34) (18 - 22)  SpO2: 99% (04 Sep 2019 15:34) (97% - 99%)    PHYSICAL EXAM:  GENERAL: NAD, well-developed  HEAD:  Atraumatic, Normocephalic  EYES: EOMI  NECK: mild wheezing audible at neck  CHEST/LUNG: nonlabored breathing, lungs CTAB, however wheeze from upper airway  HEART: nl S1/S2  ABDOMEN: nondistended, soft  EXTREMITIES:  no LE edema  PSYCH: alert, answering questions appropriately  NEUROLOGY: non-focal  SKIN: No rashes noted    LABS:                        14.1   7.41  )-----------( 264      ( 04 Sep 2019 08:53 )             43.7     09-04    141  |  100  |  13  ----------------------------<  131<H>  4.0   |  31  |  0.83    Ca    9.5      04 Sep 2019 08:53    TPro  7.9  /  Alb  4.2  /  TBili  0.4  /  DBili  x   /  AST  20  /  ALT  9   /  AlkPhos  79  09-04    PT/INR - ( 04 Sep 2019 08:55 )   PT: 13.0 SEC;   INR: 1.14          PTT - ( 04 Sep 2019 08:55 )  PTT:39.9 SEC    CAPILLARY BLOOD GLUCOSE          RADIOLOGY & ADDITIONAL STUDIES:    EKG: NSR  Personally Reviewed:  [x] YES     Imaging:   Personally Reviewed:  [] YES               Consultant(s) notes reviewed:      Care Discussed with Consultant(s)/Other Providers:    Advanced Directives: [] DNR  [] No feeding tube  [] MOLST in chart  [] MOLST completed today  [] Unknown        Ruel Man M.D.  Hospitalist  Pager [phone]

## 2019-09-04 NOTE — ED ADULT NURSE REASSESSMENT NOTE - NS ED NURSE REASSESS COMMENT FT1
Received patient in bed resting in NAD with family at bedside. Pt sating at 100% RA. Will continue to monitor.

## 2019-09-04 NOTE — CONSULT NOTE ADULT - SUBJECTIVE AND OBJECTIVE BOX
HISTORY OF PRESENT ILLNESS:  MAYELA LIANG is a 74y Male w bx proven laryngeal SCC. scheduled for resection tomorrow 9/5 but woke up this am to SOB.   patient mentions 5mo hx of progressive hoarseness. has to take small sips of water. no significant sob until the past week, with increased sob this AM. has to lie down at 45deg. does not mention problems with eating solids. first time to ED with sob symptoms.     Interval History:  Currently doing well. comfortable, sat 98% RA. no increased WOB or respiratory distress. No complaints of SOB or CP currently.    PAST MEDICAL HISTORY: Laryngeal cancer  Hyperlipidemia, mild  HTN (hypertension)  Infectious hepatitis      PAST SURGICAL HISTORY: No significant past surgical history      FAMILY HISTORY: Family history of gynecological problem  FH: colon cancer  FH: lung cancer      SOCIAL HISTORY: non smoker. social alcohol drinker    CODE STATUS: active    HOME MEDICATIONS: statin, antihypertensives    ALLERGIES: penicillin (Rash)      VITAL SIGNS:  ICU Vital Signs Last 24 Hrs  T(C): 37 (04 Sep 2019 08:30), Max: 37 (04 Sep 2019 08:30)  T(F): 98.6 (04 Sep 2019 08:30), Max: 98.6 (04 Sep 2019 08:30)  HR: 90 (04 Sep 2019 08:55) (85 - 90)  BP: 143/63 (04 Sep 2019 08:55) (143/63 - 150/85)  BP(mean): --  ABP: --  ABP(mean): --  RR: 22 (04 Sep 2019 08:55) (18 - 22)  SpO2: 98% (04 Sep 2019 08:55) (98% - 98%)    General: NAD, resting comfortably in bed at 45 degrees. saturating >95% on RA. not in respiratory distress, no increased WOB at rest.     NEURO  Exam: AAOx3, no focal neurological deficit    RESPIRATORY  Exam: voice is hoarse. Nonlabored breathing at rest. increased WOB when speaking. on auscultation there is stridor B/L    CARDIOVASCULAR  VBG - ( 04 Sep 2019 08:53 )  pH: 7.36  /  pCO2: 61    /  pO2: 31    / HCO3: 30    / Base Excess: 8.5   /  SaO2: 59.7   Lactate: 1.1      Exam:  Cardiac Rhythm:RRR      GI/NUTRITION  Exam: SNT        09-04    141  |  100  |  13  ----------------------------<  131<H>  4.0   |  31  |  0.83    Ca    9.5      04 Sep 2019 08:53    TPro  7.9  /  Alb  4.2  /  TBili  0.4  /  DBili  x   /  AST  20  /  ALT  9   /  AlkPhos  79  09-04    [ ] Moore catheter, indication: urine output monitoring in critically ill patient    HEMATOLOGIC  [ ] VTE Prophylaxis:                          14.1   7.41  )-----------( 264      ( 04 Sep 2019 08:53 )             43.7     PT/INR - ( 04 Sep 2019 08:55 )   PT: 13.0 SEC;   INR: 1.14          PTT - ( 04 Sep 2019 08:55 )  PTT:39.9 SEC  Transfusion: [ ] PRBC	[ ] Platelets	[ ] FFP	[ ] Cryoprecipitate      INFECTIOUS DISEASES    RECENT CULTURES:      ENDOCRINE    CAPILLARY BLOOD GLUCOSE          PATIENT CARE ACCESS DEVICES:  [ ] Peripheral IV  [ ] Central Venous Line	[ ] R	[ ] L	[ ] IJ	[ ] Fem	[ ] SC	Placed:   [ ] Arterial Line		[ ] R	[ ] L	[ ] Fem	[ ] Rad	[ ] Ax	Placed:   [ ] PICC:					[ ] Mediport  [ ] Urinary Catheter, Date Placed:   [x] Necessity of urinary, arterial, and venous catheters discussed    OTHER MEDICATIONS:     IMAGING STUDIES:    There is an enhancing 4.4 x 4 x 4.6 cm, (AP, TR, CC) mass involving the   right aryepiglottic fold,  right piriform sinus, right greater than left   false and true cords, with extra lateral extension into the anterior   strap muscles and thyroid with 1.1 cm subglottic extension concerning   neoplasm as detailed above.    Lymph nodes in III cervical chains, although nodes are less than 1.5 cm   is in short axis, the nodes are slightly clustered and heterogeneous and   enhancement, metastatic adenopathy is not excluded, please see PET/CT for   full description of scottie findings.

## 2019-09-04 NOTE — CONSULT NOTE ADULT - SUBJECTIVE AND OBJECTIVE BOX
HISTORY OF PRESENT ILLNESS:  MAYELA LIANG is a 74y Male w bx proven laryngeal SCC. scheduled for resection tomorrow 9/5 but woke up this am to SOB.   patient mentions 5mo hx of progressive hoarseness. has to take small sips of water. no significant sob until the past week, with increased sob this AM. has to lie down at 45deg. does not mention problems with eating solids. first time to ED with sob symptoms.     PAST MEDICAL HISTORY: Laryngeal cancer  Hyperlipidemia, mild  HTN (hypertension)  Infectious hepatitis      PAST SURGICAL HISTORY: No significant past surgical history      FAMILY HISTORY: Family history of gynecological problem  FH: colon cancer  FH: lung cancer      SOCIAL HISTORY: non smoker. social alcohol drinker    CODE STATUS: active    HOME MEDICATIONS: statin, antihypertensives    ALLERGIES: penicillin (Rash)      VITAL SIGNS:  ICU Vital Signs Last 24 Hrs  T(C): 37 (04 Sep 2019 08:30), Max: 37 (04 Sep 2019 08:30)  T(F): 98.6 (04 Sep 2019 08:30), Max: 98.6 (04 Sep 2019 08:30)  HR: 90 (04 Sep 2019 08:55) (85 - 90)  BP: 143/63 (04 Sep 2019 08:55) (143/63 - 150/85)  BP(mean): --  ABP: --  ABP(mean): --  RR: 22 (04 Sep 2019 08:55) (18 - 22)  SpO2: 98% (04 Sep 2019 08:55) (98% - 98%)    General: NAD, resting comfortably in bed at 45 degrees. saturating >95% on RA. not in respiratory distress, no increased WOB at rest.     NEURO  Exam: AAOx3, no focal neurological deficit    RESPIRATORY  Exam: voice is hoarse. Nonlabored breathing at rest. increased WOB when speaking. on auscultation there is stridor B/L    CARDIOVASCULAR  VBG - ( 04 Sep 2019 08:53 )  pH: 7.36  /  pCO2: 61    /  pO2: 31    / HCO3: 30    / Base Excess: 8.5   /  SaO2: 59.7   Lactate: 1.1      Exam:  Cardiac Rhythm:RRR      GI/NUTRITION  Exam: SNT        09-04    141  |  100  |  13  ----------------------------<  131<H>  4.0   |  31  |  0.83    Ca    9.5      04 Sep 2019 08:53    TPro  7.9  /  Alb  4.2  /  TBili  0.4  /  DBili  x   /  AST  20  /  ALT  9   /  AlkPhos  79  09-04    [ ] Moore catheter, indication: urine output monitoring in critically ill patient    HEMATOLOGIC  [ ] VTE Prophylaxis:                          14.1   7.41  )-----------( 264      ( 04 Sep 2019 08:53 )             43.7     PT/INR - ( 04 Sep 2019 08:55 )   PT: 13.0 SEC;   INR: 1.14          PTT - ( 04 Sep 2019 08:55 )  PTT:39.9 SEC  Transfusion: [ ] PRBC	[ ] Platelets	[ ] FFP	[ ] Cryoprecipitate      INFECTIOUS DISEASES    RECENT CULTURES:      ENDOCRINE    CAPILLARY BLOOD GLUCOSE          PATIENT CARE ACCESS DEVICES:  [ ] Peripheral IV  [ ] Central Venous Line	[ ] R	[ ] L	[ ] IJ	[ ] Fem	[ ] SC	Placed:   [ ] Arterial Line		[ ] R	[ ] L	[ ] Fem	[ ] Rad	[ ] Ax	Placed:   [ ] PICC:					[ ] Mediport  [ ] Urinary Catheter, Date Placed:   [x] Necessity of urinary, arterial, and venous catheters discussed    OTHER MEDICATIONS:     IMAGING STUDIES:    There is an enhancing 4.4 x 4 x 4.6 cm, (AP, TR, CC) mass involving the   right aryepiglottic fold,  right piriform sinus, right greater than left   false and true cords, with extra lateral extension into the anterior   strap muscles and thyroid with 1.1 cm subglottic extension concerning   neoplasm as detailed above.    Lymph nodes in III cervical chains, although nodes are less than 1.5 cm   is in short axis, the nodes are slightly clustered and heterogeneous and   enhancement, metastatic adenopathy is not excluded, please see PET/CT for   full description of scottie findings.

## 2019-09-04 NOTE — ED PROVIDER NOTE - CLINICAL SUMMARY MEDICAL DECISION MAKING FREE TEXT BOX
73 y/o male w/ laryngeal CA c/o worsening sob today- some inspiratory stridor on auscultation but O2 sat 98%, speaking in complete sentences. will get preop labs, ENT consult, likely admit Griffin: 73 y/o male w/ laryngeal CA c/o worsening sob today with some inspiratory stridor,  O2 sat 98%, speaking in complete sentences. will get preop labs, ENT consult, likely admit

## 2019-09-04 NOTE — H&P ADULT - HISTORY OF PRESENT ILLNESS
Patient is a 74 year old male with known T4 larynx cancer with extralaryngeal spread, HTN, HLD who presents to the ED with shortness of breath. He reports that he was sleeping this morning when he woke up around 5am with acute shortness of breath. His respiratory status is worse than last week but currently denies difficulty breathing when sitting in bed. He currently says he "feels ok". His son reports that he does become short of breath when he walks around. Reports he felt like some mucus was stuck in his throat and he had difficulty coughing it up. He reports that he otherwise has been eating food and drinking liquids ok. Denies pain.

## 2019-09-05 ENCOUNTER — RESULT REVIEW (OUTPATIENT)
Age: 74
End: 2019-09-05

## 2019-09-05 ENCOUNTER — APPOINTMENT (OUTPATIENT)
Dept: OTOLARYNGOLOGY | Facility: HOSPITAL | Age: 74
End: 2019-09-05

## 2019-09-05 LAB
ANION GAP SERPL CALC-SCNC: 10 MMO/L — SIGNIFICANT CHANGE UP (ref 7–14)
ANION GAP SERPL CALC-SCNC: 12 MMO/L — SIGNIFICANT CHANGE UP (ref 7–14)
APTT BLD: 35.5 SEC — SIGNIFICANT CHANGE UP (ref 27.5–36.3)
BASE EXCESS BLDA CALC-SCNC: 2.8 MMOL/L — SIGNIFICANT CHANGE UP
BASE EXCESS BLDA CALC-SCNC: 3.3 MMOL/L — SIGNIFICANT CHANGE UP
BASE EXCESS BLDA CALC-SCNC: 3.4 MMOL/L — SIGNIFICANT CHANGE UP
BASE EXCESS BLDA CALC-SCNC: 3.9 MMOL/L — SIGNIFICANT CHANGE UP
BASE EXCESS BLDA CALC-SCNC: 4.1 MMOL/L — SIGNIFICANT CHANGE UP
BLOOD GAS ARTERIAL - FIO2: 40 — SIGNIFICANT CHANGE UP
BUN SERPL-MCNC: 13 MG/DL — SIGNIFICANT CHANGE UP (ref 7–23)
BUN SERPL-MCNC: 16 MG/DL — SIGNIFICANT CHANGE UP (ref 7–23)
CA-I BLDA-SCNC: 1.04 MMOL/L — LOW (ref 1.15–1.29)
CA-I BLDA-SCNC: 1.08 MMOL/L — LOW (ref 1.15–1.29)
CA-I BLDA-SCNC: 1.1 MMOL/L — LOW (ref 1.15–1.29)
CA-I BLDA-SCNC: 1.1 MMOL/L — LOW (ref 1.15–1.29)
CA-I BLDA-SCNC: 1.15 MMOL/L — SIGNIFICANT CHANGE UP (ref 1.15–1.29)
CALCIUM SERPL-MCNC: 8.3 MG/DL — LOW (ref 8.4–10.5)
CALCIUM SERPL-MCNC: 9.6 MG/DL — SIGNIFICANT CHANGE UP (ref 8.4–10.5)
CHLORIDE SERPL-SCNC: 100 MMOL/L — SIGNIFICANT CHANGE UP (ref 98–107)
CHLORIDE SERPL-SCNC: 103 MMOL/L — SIGNIFICANT CHANGE UP (ref 98–107)
CO2 SERPL-SCNC: 27 MMOL/L — SIGNIFICANT CHANGE UP (ref 22–31)
CO2 SERPL-SCNC: 29 MMOL/L — SIGNIFICANT CHANGE UP (ref 22–31)
CREAT SERPL-MCNC: 0.74 MG/DL — SIGNIFICANT CHANGE UP (ref 0.5–1.3)
CREAT SERPL-MCNC: 1.06 MG/DL — SIGNIFICANT CHANGE UP (ref 0.5–1.3)
GLUCOSE BLDA-MCNC: 180 MG/DL — HIGH (ref 70–99)
GLUCOSE BLDA-MCNC: 182 MG/DL — HIGH (ref 70–99)
GLUCOSE BLDA-MCNC: 183 MG/DL — HIGH (ref 70–99)
GLUCOSE BLDA-MCNC: 193 MG/DL — HIGH (ref 70–99)
GLUCOSE BLDA-MCNC: 203 MG/DL — HIGH (ref 70–99)
GLUCOSE SERPL-MCNC: 185 MG/DL — HIGH (ref 70–99)
GLUCOSE SERPL-MCNC: 188 MG/DL — HIGH (ref 70–99)
HCO3 BLDA-SCNC: 27 MMOL/L — HIGH (ref 22–26)
HCO3 BLDA-SCNC: 28 MMOL/L — HIGH (ref 22–26)
HCO3 BLDA-SCNC: 29 MMOL/L — HIGH (ref 22–26)
HCT VFR BLD CALC: 33.8 % — LOW (ref 39–50)
HCT VFR BLD CALC: 41.9 % — SIGNIFICANT CHANGE UP (ref 39–50)
HCT VFR BLDA CALC: 32.9 % — LOW (ref 39–51)
HCT VFR BLDA CALC: 32.9 % — LOW (ref 39–51)
HCT VFR BLDA CALC: 34.1 % — LOW (ref 39–51)
HCT VFR BLDA CALC: 34.5 % — LOW (ref 39–51)
HCT VFR BLDA CALC: 37.3 % — LOW (ref 39–51)
HGB BLD-MCNC: 10.9 G/DL — LOW (ref 13–17)
HGB BLD-MCNC: 13.6 G/DL — SIGNIFICANT CHANGE UP (ref 13–17)
HGB BLDA-MCNC: 10.7 G/DL — LOW (ref 13–17)
HGB BLDA-MCNC: 10.7 G/DL — LOW (ref 13–17)
HGB BLDA-MCNC: 11.1 G/DL — LOW (ref 13–17)
HGB BLDA-MCNC: 11.2 G/DL — LOW (ref 13–17)
HGB BLDA-MCNC: 12.1 G/DL — LOW (ref 13–17)
INR BLD: 1.2 — HIGH (ref 0.88–1.17)
LACTATE BLDA-SCNC: 2.1 MMOL/L — HIGH (ref 0.5–2)
MAGNESIUM SERPL-MCNC: 1.6 MG/DL — SIGNIFICANT CHANGE UP (ref 1.6–2.6)
MAGNESIUM SERPL-MCNC: 2.1 MG/DL — SIGNIFICANT CHANGE UP (ref 1.6–2.6)
MCHC RBC-ENTMCNC: 28.4 PG — SIGNIFICANT CHANGE UP (ref 27–34)
MCHC RBC-ENTMCNC: 28.6 PG — SIGNIFICANT CHANGE UP (ref 27–34)
MCHC RBC-ENTMCNC: 32.2 % — SIGNIFICANT CHANGE UP (ref 32–36)
MCHC RBC-ENTMCNC: 32.5 % — SIGNIFICANT CHANGE UP (ref 32–36)
MCV RBC AUTO: 88 FL — SIGNIFICANT CHANGE UP (ref 80–100)
MCV RBC AUTO: 88 FL — SIGNIFICANT CHANGE UP (ref 80–100)
NRBC # FLD: 0 K/UL — SIGNIFICANT CHANGE UP (ref 0–0)
NRBC # FLD: 0 K/UL — SIGNIFICANT CHANGE UP (ref 0–0)
PCO2 BLDA: 34 MMHG — LOW (ref 35–48)
PCO2 BLDA: 37 MMHG — SIGNIFICANT CHANGE UP (ref 35–48)
PCO2 BLDA: 40 MMHG — SIGNIFICANT CHANGE UP (ref 35–48)
PH BLDA: 7.45 PH — SIGNIFICANT CHANGE UP (ref 7.35–7.45)
PH BLDA: 7.47 PH — HIGH (ref 7.35–7.45)
PH BLDA: 7.5 PH — HIGH (ref 7.35–7.45)
PH BLDA: 7.5 PH — HIGH (ref 7.35–7.45)
PH BLDA: 7.52 PH — HIGH (ref 7.35–7.45)
PHOSPHATE SERPL-MCNC: 1.8 MG/DL — LOW (ref 2.5–4.5)
PHOSPHATE SERPL-MCNC: 2.9 MG/DL — SIGNIFICANT CHANGE UP (ref 2.5–4.5)
PLATELET # BLD AUTO: 233 K/UL — SIGNIFICANT CHANGE UP (ref 150–400)
PLATELET # BLD AUTO: 286 K/UL — SIGNIFICANT CHANGE UP (ref 150–400)
PMV BLD: 9.4 FL — SIGNIFICANT CHANGE UP (ref 7–13)
PMV BLD: 9.7 FL — SIGNIFICANT CHANGE UP (ref 7–13)
PO2 BLDA: 118 MMHG — HIGH (ref 83–108)
PO2 BLDA: 131 MMHG — HIGH (ref 83–108)
PO2 BLDA: 177 MMHG — HIGH (ref 83–108)
PO2 BLDA: 234 MMHG — HIGH (ref 83–108)
PO2 BLDA: 64 MMHG — LOW (ref 83–108)
POTASSIUM BLDA-SCNC: 3.2 MMOL/L — LOW (ref 3.4–4.5)
POTASSIUM BLDA-SCNC: 3.5 MMOL/L — SIGNIFICANT CHANGE UP (ref 3.4–4.5)
POTASSIUM BLDA-SCNC: 3.5 MMOL/L — SIGNIFICANT CHANGE UP (ref 3.4–4.5)
POTASSIUM BLDA-SCNC: 3.6 MMOL/L — SIGNIFICANT CHANGE UP (ref 3.4–4.5)
POTASSIUM BLDA-SCNC: 3.6 MMOL/L — SIGNIFICANT CHANGE UP (ref 3.4–4.5)
POTASSIUM SERPL-MCNC: 3.7 MMOL/L — SIGNIFICANT CHANGE UP (ref 3.5–5.3)
POTASSIUM SERPL-MCNC: 4.3 MMOL/L — SIGNIFICANT CHANGE UP (ref 3.5–5.3)
POTASSIUM SERPL-SCNC: 3.7 MMOL/L — SIGNIFICANT CHANGE UP (ref 3.5–5.3)
POTASSIUM SERPL-SCNC: 4.3 MMOL/L — SIGNIFICANT CHANGE UP (ref 3.5–5.3)
PROTHROM AB SERPL-ACNC: 13.4 SEC — HIGH (ref 9.8–13.1)
RBC # BLD: 3.84 M/UL — LOW (ref 4.2–5.8)
RBC # BLD: 4.76 M/UL — SIGNIFICANT CHANGE UP (ref 4.2–5.8)
RBC # FLD: 12.1 % — SIGNIFICANT CHANGE UP (ref 10.3–14.5)
RBC # FLD: 12.5 % — SIGNIFICANT CHANGE UP (ref 10.3–14.5)
SAO2 % BLDA: 93.4 % — LOW (ref 95–99)
SAO2 % BLDA: 98.4 % — SIGNIFICANT CHANGE UP (ref 95–99)
SAO2 % BLDA: 99.1 % — HIGH (ref 95–99)
SAO2 % BLDA: 99.4 % — HIGH (ref 95–99)
SAO2 % BLDA: 99.6 % — HIGH (ref 95–99)
SODIUM BLDA-SCNC: 136 MMOL/L — SIGNIFICANT CHANGE UP (ref 136–146)
SODIUM BLDA-SCNC: 137 MMOL/L — SIGNIFICANT CHANGE UP (ref 136–146)
SODIUM SERPL-SCNC: 140 MMOL/L — SIGNIFICANT CHANGE UP (ref 135–145)
SODIUM SERPL-SCNC: 141 MMOL/L — SIGNIFICANT CHANGE UP (ref 135–145)
WBC # BLD: 15.36 K/UL — HIGH (ref 3.8–10.5)
WBC # BLD: 6.84 K/UL — SIGNIFICANT CHANGE UP (ref 3.8–10.5)
WBC # FLD AUTO: 15.36 K/UL — HIGH (ref 3.8–10.5)
WBC # FLD AUTO: 6.84 K/UL — SIGNIFICANT CHANGE UP (ref 3.8–10.5)

## 2019-09-05 PROCEDURE — 42890 LIMITED PHARYNGECTOMY: CPT | Mod: GC

## 2019-09-05 PROCEDURE — 88331 PATH CONSLTJ SURG 1 BLK 1SPC: CPT | Mod: 26

## 2019-09-05 PROCEDURE — 31611 CONSTJ TRACHESOPHGL FSTL: CPT | Mod: GC

## 2019-09-05 PROCEDURE — 60240 REMOVAL OF THYROID: CPT | Mod: GC

## 2019-09-05 PROCEDURE — 15100 SPLT AGRFT T/A/L 1ST 100SQCM: CPT

## 2019-09-05 PROCEDURE — 38724 REMOVAL OF LYMPH NODES NECK: CPT | Mod: 50,GC,59

## 2019-09-05 PROCEDURE — 60512 AUTOTRANSPLANT PARATHYROID: CPT | Mod: GC

## 2019-09-05 PROCEDURE — 71045 X-RAY EXAM CHEST 1 VIEW: CPT | Mod: 26

## 2019-09-05 PROCEDURE — 88311 DECALCIFY TISSUE: CPT | Mod: 26

## 2019-09-05 PROCEDURE — 15757 FREE SKIN FLAP MICROVASC: CPT

## 2019-09-05 PROCEDURE — 42950 RECONSTRUCTION OF THROAT: CPT

## 2019-09-05 PROCEDURE — 88309 TISSUE EXAM BY PATHOLOGIST: CPT | Mod: 26

## 2019-09-05 PROCEDURE — 31360 REMOVAL OF LARYNX: CPT | Mod: GC

## 2019-09-05 PROCEDURE — 88305 TISSUE EXAM BY PATHOLOGIST: CPT | Mod: 26

## 2019-09-05 RX ORDER — DEXMEDETOMIDINE HYDROCHLORIDE IN 0.9% SODIUM CHLORIDE 4 UG/ML
0.05 INJECTION INTRAVENOUS
Qty: 200 | Refills: 0 | Status: DISCONTINUED | OUTPATIENT
Start: 2019-09-05 | End: 2019-09-06

## 2019-09-05 RX ORDER — HYDROCHLOROTHIAZIDE 25 MG
25 TABLET ORAL DAILY
Refills: 0 | Status: DISCONTINUED | OUTPATIENT
Start: 2019-09-05 | End: 2019-09-17

## 2019-09-05 RX ORDER — CHLORHEXIDINE GLUCONATE 213 G/1000ML
1 SOLUTION TOPICAL DAILY
Refills: 0 | Status: DISCONTINUED | OUTPATIENT
Start: 2019-09-05 | End: 2019-09-08

## 2019-09-05 RX ORDER — OXYCODONE HYDROCHLORIDE 5 MG/1
5 TABLET ORAL
Refills: 0 | Status: DISCONTINUED | OUTPATIENT
Start: 2019-09-05 | End: 2019-09-05

## 2019-09-05 RX ORDER — PROPOFOL 10 MG/ML
15 INJECTION, EMULSION INTRAVENOUS
Qty: 1000 | Refills: 0 | Status: DISCONTINUED | OUTPATIENT
Start: 2019-09-05 | End: 2019-09-05

## 2019-09-05 RX ORDER — LEVOTHYROXINE SODIUM 125 MCG
125 TABLET ORAL DAILY
Refills: 0 | Status: DISCONTINUED | OUTPATIENT
Start: 2019-09-05 | End: 2019-09-07

## 2019-09-05 RX ORDER — HYDROMORPHONE HYDROCHLORIDE 2 MG/ML
0.5 INJECTION INTRAMUSCULAR; INTRAVENOUS; SUBCUTANEOUS
Refills: 0 | Status: DISCONTINUED | OUTPATIENT
Start: 2019-09-05 | End: 2019-09-06

## 2019-09-05 RX ORDER — PROPOFOL 10 MG/ML
30 INJECTION, EMULSION INTRAVENOUS
Qty: 1000 | Refills: 0 | Status: DISCONTINUED | OUTPATIENT
Start: 2019-09-05 | End: 2019-09-06

## 2019-09-05 RX ORDER — OXYCODONE HYDROCHLORIDE 5 MG/1
10 TABLET ORAL
Refills: 0 | Status: DISCONTINUED | OUTPATIENT
Start: 2019-09-05 | End: 2019-09-05

## 2019-09-05 RX ORDER — CALCITRIOL 0.5 UG/1
0.5 CAPSULE ORAL
Refills: 0 | Status: DISCONTINUED | OUTPATIENT
Start: 2019-09-05 | End: 2019-09-05

## 2019-09-05 RX ORDER — CALCIUM CARBONATE 500(1250)
2500 TABLET ORAL THREE TIMES A DAY
Refills: 0 | Status: DISCONTINUED | OUTPATIENT
Start: 2019-09-05 | End: 2019-09-12

## 2019-09-05 RX ORDER — ASPIRIN/CALCIUM CARB/MAGNESIUM 324 MG
81 TABLET ORAL DAILY
Refills: 0 | Status: DISCONTINUED | OUTPATIENT
Start: 2019-09-05 | End: 2019-09-05

## 2019-09-05 RX ORDER — SIMVASTATIN 20 MG/1
20 TABLET, FILM COATED ORAL AT BEDTIME
Refills: 0 | Status: DISCONTINUED | OUTPATIENT
Start: 2019-09-05 | End: 2019-09-17

## 2019-09-05 RX ORDER — CALCITRIOL 0.5 UG/1
0.5 CAPSULE ORAL
Refills: 0 | Status: DISCONTINUED | OUTPATIENT
Start: 2019-09-05 | End: 2019-09-10

## 2019-09-05 RX ORDER — ENOXAPARIN SODIUM 100 MG/ML
40 INJECTION SUBCUTANEOUS DAILY
Refills: 0 | Status: DISCONTINUED | OUTPATIENT
Start: 2019-09-06 | End: 2019-09-17

## 2019-09-05 RX ORDER — SODIUM CHLORIDE 9 MG/ML
1000 INJECTION, SOLUTION INTRAVENOUS
Refills: 0 | Status: DISCONTINUED | OUTPATIENT
Start: 2019-09-05 | End: 2019-09-06

## 2019-09-05 RX ORDER — ENOXAPARIN SODIUM 100 MG/ML
40 INJECTION SUBCUTANEOUS ONCE
Refills: 0 | Status: DISCONTINUED | OUTPATIENT
Start: 2019-09-05 | End: 2019-09-05

## 2019-09-05 RX ORDER — ASPIRIN/CALCIUM CARB/MAGNESIUM 324 MG
81 TABLET ORAL DAILY
Refills: 0 | Status: DISCONTINUED | OUTPATIENT
Start: 2019-09-06 | End: 2019-09-17

## 2019-09-05 RX ORDER — ASPIRIN/CALCIUM CARB/MAGNESIUM 324 MG
300 TABLET ORAL ONCE
Refills: 0 | Status: COMPLETED | OUTPATIENT
Start: 2019-09-05 | End: 2019-09-05

## 2019-09-05 RX ORDER — CHLORHEXIDINE GLUCONATE 213 G/1000ML
15 SOLUTION TOPICAL EVERY 12 HOURS
Refills: 0 | Status: DISCONTINUED | OUTPATIENT
Start: 2019-09-05 | End: 2019-09-06

## 2019-09-05 RX ORDER — CALCIUM CARBONATE 500(1250)
2 TABLET ORAL THREE TIMES A DAY
Refills: 0 | Status: DISCONTINUED | OUTPATIENT
Start: 2019-09-05 | End: 2019-09-05

## 2019-09-05 RX ORDER — AMLODIPINE BESYLATE 2.5 MG/1
10 TABLET ORAL DAILY
Refills: 0 | Status: DISCONTINUED | OUTPATIENT
Start: 2019-09-05 | End: 2019-09-17

## 2019-09-05 RX ADMIN — Medication 102 MILLIGRAM(S): at 06:25

## 2019-09-05 RX ADMIN — Medication 100 MILLIGRAM(S): at 22:00

## 2019-09-05 RX ADMIN — Medication 300 MILLIGRAM(S): at 22:37

## 2019-09-05 RX ADMIN — AMLODIPINE BESYLATE 10 MILLIGRAM(S): 2.5 TABLET ORAL at 06:26

## 2019-09-05 NOTE — BRIEF OPERATIVE NOTE - OPERATION/FINDINGS
reconstruction of anterior pharynx after total laryngectomy with radial forearm flap from left arm, stsg from left thigh to forearm donor site

## 2019-09-05 NOTE — CONSULT NOTE ADULT - SUBJECTIVE AND OBJECTIVE BOX
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 He underwent laryngectomy, neck dissection, tracheostomy, reconstruction of anterior pharynx with radial forearm flap from left arm, skin graft from left thigh to forearm donor site, thyroidectomy with reimplantation with ENT and plastic surgery. Case was routine. Admitted to SICU paralyzed, mechanically ventilated via new tracheostomy, to have Q1H flap checks.     Surgery Information  OR time:  > 12 H    EBL: 350         IV Fluids: 3 L crystalloid, 500 mL albumin      Blood Products: 0  UOP: 650 mL         PAST MEDICAL & SURGICAL HISTORY:  Laryngeal cancer  Hyperlipidemia, mild  HTN (hypertension)  Infectious hepatitis: 1958  No significant past surgical history    Home Meds: Home Medications:  amLODIPine 10 mg oral tablet: 1 tab(s) orally once a day (28 Aug 2019 10:31)  aspirin 81 mg oral tablet: 1 tab(s) orally once a day (28 Aug 2019 10:31)  hydroCHLOROthiazide 25 mg oral tablet: 1 tab(s) orally once a day (28 Aug 2019 10:31)  simvastatin 20 mg oral tablet: 1 tab(s) orally once a day (at bedtime) (28 Aug 2019 10:31)    Allergies: Allergies    penicillin (Rash)  Intolerances      Soc:   Advanced Directives: Presumed Full Code     ROS:    REVIEW OF SYSTEMS    [x] A ten-point review of systems was otherwise negative except as noted.  [ ] Due to altered mental status/intubation, subjective information were not able to be obtained from the patient. History was obtained, to the extent possible, from review of the chart and collateral sources of information.      CURRENT MEDICATIONS:   --------------------------------------------------------------------------------------  Neurologic Medications  aspirin Suppository 300 milliGRAM(s) Rectal once  dexmedetomidine Infusion 0.05 MICROgram(s)/kG/Hr IV Continuous <Continuous>  propofol Infusion 15 MICROgram(s)/kG/Min IV Continuous <Continuous>    Respiratory Medications    Cardiovascular Medications  amLODIPine   Tablet 10 milliGRAM(s) Oral daily  hydrochlorothiazide 25 milliGRAM(s) Oral daily    Gastrointestinal Medications  calcitriol   Capsule 0.5 MICROGram(s) Oral two times a day  calcium carbonate   1250 mG (OsCal) 2 Tablet(s) Oral three times a day  dextrose 5% + sodium chloride 0.45%. 1000 milliLiter(s) IV Continuous <Continuous>    Genitourinary Medications    Hematologic/Oncologic Medications    Antimicrobial/Immunologic Medications  clindamycin IVPB      clindamycin IVPB 600 milliGRAM(s) IV Intermittent once    Endocrine/Metabolic Medications  dexamethasone  IVPB 10 milliGRAM(s) IV Intermittent every 8 hours  levothyroxine 125 MICROGram(s) Oral daily  simvastatin 20 milliGRAM(s) Oral at bedtime    Topical/Other Medications  chlorhexidine 0.12% Liquid 15 milliLiter(s) Oral Mucosa every 12 hours  chlorhexidine 4% Liquid 1 Application(s) Topical daily    --------------------------------------------------------------------------------------    VITAL SIGNS, INS/OUTS (last 24 hours):  --------------------------------------------------------------------------------------  ICU Vital Signs Last 24 Hrs  T(C): 36.5 (05 Sep 2019 06:41), Max: 36.5 (05 Sep 2019 06:15)  T(F): 97.7 (05 Sep 2019 06:41), Max: 97.7 (05 Sep 2019 06:15)  HR: 88 (05 Sep 2019 21:00) (88 - 100)  BP: 143/62 (05 Sep 2019 06:41) (143/62 - 164/89)  BP(mean): --  ABP: 121/54 (05 Sep 2019 21:00) (121/54 - 121/54)  ABP(mean): 78 (05 Sep 2019 21:00) (78 - 78)  RR: 21 (05 Sep 2019 21:00) (16 - 21)  SpO2: 100% (05 Sep 2019 21:00) (99% - 100%)    I&O's Summary    --------------------------------------------------------------------------------------    EXAM:  General/Neuro  Exam: Paralyzed and sedated      Respiratory  Exam: Lungs clear to auscultation, Normal expansion/effort.  [x] Tracheostomy   [] Intubated  Mechanical Ventilation: VC: , RR 12, PEEP 5, FiO2 40%     Cardiovascular  Exam: S1, S2.  Regular rate and rhythm.  No Peripheral edema    Cardiac Rhythm: Normal Sinus Rhythm    GI  Exam: Abdomen soft, Non-tender, Non-distended.   Nasogastric tube sutured in place   Current Diet:  NPO      Tubes/Lines/Drains   [x] Peripheral IV x 2  [] Central Venous Line     	[] R	[] L	[] IJ	[] Fem	[] SC        Type:	    Date Placed:   [x] Arterial Line		[x] R	[] L	[] Fem	[x] Rad	[] Ax	Date Placed:   [] PICC:         	[] Midline		[] Mediport           [] Urinary Catheter		Date Placed:     Extremities  Exam: Extremities warm, pink, well-perfused.      Derm:  Exam: Good skin turgor, no skin breakdown.      :   Exam: Fong catheter in place.     LABS  --------------------------------------------------------------------------------------  Labs:                      10.9   15.36 )-----------( 233      ( 05 Sep 2019 21:11 )             33.8         09-05    140  |  103  |  16  ----------------------------<  185<H>  3.7   |  27  |  1.06      eGFR if Non : 69 mL/min (09-05-19 @ 21:11)      LFTs:             7.9  | 0.4  | 20       ------------------[79      ( 04 Sep 2019 08:53 )  4.2  | x    | 9           Lipase:x      Amylase:x         Blood Gas Arterial - Lactate: 2.1 mmol/L (09-05-19 @ 21:11)  Blood Gas Venous - Lactate: 1.1 mmol/L (09-04-19 @ 08:53)    ABG - ( 05 Sep 2019 21:11 )  pH: 7.45  /  pCO2: 40    /  pO2: 118   / HCO3: 28    / Base Excess: 3.9   /  SaO2: 98.4        ABG - ( 05 Sep 2019 20:10 )  pH: 7.52  /  pCO2: 34    /  pO2: 234   / HCO3: 29    / Base Excess: 4.1   /  SaO2: 99.6        ABG - ( 05 Sep 2019 18:32 )  pH: 7.50  /  pCO2: 34    /  pO2: 131   / HCO3: 27    / Base Excess: 2.8   /  SaO2: 99.1        Coags:     13.4   ----< 1.20    ( 05 Sep 2019 07:56 )     35.5       --------------------------------------------------------------------------------------    OTHER LABS      ASSESSMENT:  74 year old M with laryngeal cancer thought to be secondary to chronic GERD with extralaryngeal spread, HTN, HLD s/p laryngectomy, neck dissection, tracheostomy, reconstruction of anterior pharynx with radial forearm flap from left arm, skin graft from left thigh to forearm donor site, thyroidectomy with reimplantation with ENT and plastic surgery admitted to SICU for new tracheostomy and Q1H flap checks.    PLAN:   Neurologic:   - Sedation with Precedex overnight, will wean in AM for SBT  - Oxycodone PRN for moderate to severe pain via PEG tube, IV Dilaudid overnight PRN    Respiratory:  - S/p laryngectomy, tracheostomy in place   - C/w mechanical ventilation via trach collar, VC , RR 12, PEEP 5, FiO2 40%  - Pressure support/ SBT in AM  - Q1H pharyngeal flap checks    Cardiovascular:   - BP monitoring, c/w home HTN medications Amlodipine and Losartan    Gastrointestinal/Nutrition:   - NPO, will confirm NGT placement w/ CXR then start tube feeds    Renal/Genitourinary:   - No issues  - LR at 75 mL/hr  - Trend BMP    Hematologic:   -  mg rectal then 81 mg daily for flap perfusion  - Lovenox for DVT prophylaxis    Infectious Disease:   - Penicillin allergic, continue with Clindamycin    Lines/Tubes:  PIV x 2, fong catheter, R radial arterial line    Endocrine:   - S/p total thyroidectomy- continue with PO Sythroid  - f/u PTH- re-implantation of parathytroid, expect stunning  - Q8H BMP, ionized calcium  - Calcium supplementation- Calcitriol 0.5 mcg BID, Calcium carbonate 2500 mg TID    Disposition: SICU    --------------------------------------------------------------------------------------  Critical Care Diagnoses: Laryngeal cancer, respiratory failure, parathyroid dysfunction/ hypocalcemia

## 2019-09-05 NOTE — CHART NOTE - NSCHARTNOTEFT_GEN_A_CORE
Patient examined at bedside. Breathing comfortably on RA, speaking in full sentences. Mild wheezing appreciated in the setting of the mass, no concern for acute compromised airway. Vitals stable, saturates well. OR planned for morning. Patient examined at bedside. Vitals stable, saturates >95 on RA. Unlabored breathing  on RA, speaking in full sentences. Mild wheezing appreciated in the setting of the mass, no concern for acute compromised airway. Patient has no concerns at this moment. OR planned for morning. Patient examined at bedside. Vitals stable, saturates >95 on RA. Unlabored breathing  on RA, speaking in full sentences. Mild wheezing appreciated in the setting of the mass, no concern for acute compromised airway. Patient has no concerns at this moment. OR planned for morning.  Agree

## 2019-09-05 NOTE — PROGRESS NOTE ADULT - SUBJECTIVE AND OBJECTIVE BOX
Patient seen and examined at bedside. No acute events overnight. Breathing stable on RA   Medicine consult seen and recommendations in chart   OR this morning, NPO since midnight, AM labs drawn     T(C): 36.5 (09-05-19 @ 06:41), Max: 37 (09-04-19 @ 08:30)  HR: 96 (09-05-19 @ 06:41) (85 - 105)  BP: 143/62 (09-05-19 @ 06:41) (143/62 - 164/89)  RR: 16 (09-05-19 @ 06:41) (16 - 22)  SpO2: 99% (09-05-19 @ 06:41) (97% - 100%)    NAD, awake and alert  Breathing comfortably on room air  Voice hoarseness, no stridor   Neck: soft, flat    A/P: laryngeal cancer preadmitted for respiratory distress, going to OR for total laryngectomy today   -NPO/IVF  -preop labs  -appreciate medicine consult and RASHAAD  -AM medications per medicine consult note  -OR today

## 2019-09-06 LAB
ALBUMIN SERPL ELPH-MCNC: 2.9 G/DL — LOW (ref 3.3–5)
ALBUMIN SERPL ELPH-MCNC: 3.1 G/DL — LOW (ref 3.3–5)
ALBUMIN SERPL ELPH-MCNC: 3.2 G/DL — LOW (ref 3.3–5)
ALP SERPL-CCNC: 47 U/L — SIGNIFICANT CHANGE UP (ref 40–120)
ALP SERPL-CCNC: 49 U/L — SIGNIFICANT CHANGE UP (ref 40–120)
ALP SERPL-CCNC: 51 U/L — SIGNIFICANT CHANGE UP (ref 40–120)
ALT FLD-CCNC: 6 U/L — SIGNIFICANT CHANGE UP (ref 4–41)
ALT FLD-CCNC: 7 U/L — SIGNIFICANT CHANGE UP (ref 4–41)
ALT FLD-CCNC: 8 U/L — SIGNIFICANT CHANGE UP (ref 4–41)
ANION GAP SERPL CALC-SCNC: 10 MMO/L — SIGNIFICANT CHANGE UP (ref 7–14)
ANION GAP SERPL CALC-SCNC: 11 MMO/L — SIGNIFICANT CHANGE UP (ref 7–14)
ANION GAP SERPL CALC-SCNC: 12 MMO/L — SIGNIFICANT CHANGE UP (ref 7–14)
AST SERPL-CCNC: 14 U/L — SIGNIFICANT CHANGE UP (ref 4–40)
AST SERPL-CCNC: 14 U/L — SIGNIFICANT CHANGE UP (ref 4–40)
AST SERPL-CCNC: 21 U/L — SIGNIFICANT CHANGE UP (ref 4–40)
BASE EXCESS BLDA CALC-SCNC: 6.3 MMOL/L — SIGNIFICANT CHANGE UP
BILIRUB SERPL-MCNC: 0.3 MG/DL — SIGNIFICANT CHANGE UP (ref 0.2–1.2)
BILIRUB SERPL-MCNC: 0.4 MG/DL — SIGNIFICANT CHANGE UP (ref 0.2–1.2)
BILIRUB SERPL-MCNC: 0.4 MG/DL — SIGNIFICANT CHANGE UP (ref 0.2–1.2)
BLOOD GAS ARTERIAL - FIO2: 40 — SIGNIFICANT CHANGE UP
BUN SERPL-MCNC: 17 MG/DL — SIGNIFICANT CHANGE UP (ref 7–23)
BUN SERPL-MCNC: 17 MG/DL — SIGNIFICANT CHANGE UP (ref 7–23)
BUN SERPL-MCNC: 18 MG/DL — SIGNIFICANT CHANGE UP (ref 7–23)
CA-I BLD-SCNC: 0.99 MMOL/L — LOW (ref 1.03–1.23)
CA-I BLDA-SCNC: 1.03 MMOL/L — LOW (ref 1.15–1.29)
CA-I BLDA-SCNC: 1.09 MMOL/L — LOW (ref 1.15–1.29)
CA-I BLDA-SCNC: 1.1 MMOL/L — LOW (ref 1.15–1.29)
CALCIUM SERPL-MCNC: 7.8 MG/DL — LOW (ref 8.4–10.5)
CALCIUM SERPL-MCNC: 8 MG/DL — LOW (ref 8.4–10.5)
CALCIUM SERPL-MCNC: 8.3 MG/DL — LOW (ref 8.4–10.5)
CHLORIDE SERPL-SCNC: 101 MMOL/L — SIGNIFICANT CHANGE UP (ref 98–107)
CHLORIDE SERPL-SCNC: 102 MMOL/L — SIGNIFICANT CHANGE UP (ref 98–107)
CHLORIDE SERPL-SCNC: 102 MMOL/L — SIGNIFICANT CHANGE UP (ref 98–107)
CO2 SERPL-SCNC: 26 MMOL/L — SIGNIFICANT CHANGE UP (ref 22–31)
CO2 SERPL-SCNC: 28 MMOL/L — SIGNIFICANT CHANGE UP (ref 22–31)
CO2 SERPL-SCNC: 28 MMOL/L — SIGNIFICANT CHANGE UP (ref 22–31)
CREAT SERPL-MCNC: 0.93 MG/DL — SIGNIFICANT CHANGE UP (ref 0.5–1.3)
CREAT SERPL-MCNC: 0.98 MG/DL — SIGNIFICANT CHANGE UP (ref 0.5–1.3)
CREAT SERPL-MCNC: 1.03 MG/DL — SIGNIFICANT CHANGE UP (ref 0.5–1.3)
GLUCOSE BLDA-MCNC: 175 MG/DL — HIGH (ref 70–99)
GLUCOSE SERPL-MCNC: 147 MG/DL — HIGH (ref 70–99)
GLUCOSE SERPL-MCNC: 156 MG/DL — HIGH (ref 70–99)
GLUCOSE SERPL-MCNC: 182 MG/DL — HIGH (ref 70–99)
HCO3 BLDA-SCNC: 30 MMOL/L — HIGH (ref 22–26)
HCT VFR BLD CALC: 32.8 % — LOW (ref 39–50)
HCT VFR BLDA CALC: 33.3 % — LOW (ref 39–51)
HGB BLD-MCNC: 10.5 G/DL — LOW (ref 13–17)
HGB BLDA-MCNC: 10.8 G/DL — LOW (ref 13–17)
LACTATE BLDA-SCNC: 1.7 MMOL/L — SIGNIFICANT CHANGE UP (ref 0.5–2)
MAGNESIUM SERPL-MCNC: 1.6 MG/DL — SIGNIFICANT CHANGE UP (ref 1.6–2.6)
MAGNESIUM SERPL-MCNC: 2.1 MG/DL — SIGNIFICANT CHANGE UP (ref 1.6–2.6)
MAGNESIUM SERPL-MCNC: 2.4 MG/DL — SIGNIFICANT CHANGE UP (ref 1.6–2.6)
MCHC RBC-ENTMCNC: 28.5 PG — SIGNIFICANT CHANGE UP (ref 27–34)
MCHC RBC-ENTMCNC: 32 % — SIGNIFICANT CHANGE UP (ref 32–36)
MCV RBC AUTO: 88.9 FL — SIGNIFICANT CHANGE UP (ref 80–100)
NRBC # FLD: 0 K/UL — SIGNIFICANT CHANGE UP (ref 0–0)
PCO2 BLDA: 42 MMHG — SIGNIFICANT CHANGE UP (ref 35–48)
PH BLDA: 7.47 PH — HIGH (ref 7.35–7.45)
PHOSPHATE SERPL-MCNC: 2.7 MG/DL — SIGNIFICANT CHANGE UP (ref 2.5–4.5)
PHOSPHATE SERPL-MCNC: 2.9 MG/DL — SIGNIFICANT CHANGE UP (ref 2.5–4.5)
PHOSPHATE SERPL-MCNC: 3.2 MG/DL — SIGNIFICANT CHANGE UP (ref 2.5–4.5)
PLATELET # BLD AUTO: 225 K/UL — SIGNIFICANT CHANGE UP (ref 150–400)
PMV BLD: 9.6 FL — SIGNIFICANT CHANGE UP (ref 7–13)
PO2 BLDA: 129 MMHG — HIGH (ref 83–108)
POTASSIUM BLDA-SCNC: 3.3 MMOL/L — LOW (ref 3.4–4.5)
POTASSIUM SERPL-MCNC: 3.5 MMOL/L — SIGNIFICANT CHANGE UP (ref 3.5–5.3)
POTASSIUM SERPL-MCNC: 3.7 MMOL/L — SIGNIFICANT CHANGE UP (ref 3.5–5.3)
POTASSIUM SERPL-MCNC: 4.1 MMOL/L — SIGNIFICANT CHANGE UP (ref 3.5–5.3)
POTASSIUM SERPL-SCNC: 3.5 MMOL/L — SIGNIFICANT CHANGE UP (ref 3.5–5.3)
POTASSIUM SERPL-SCNC: 3.7 MMOL/L — SIGNIFICANT CHANGE UP (ref 3.5–5.3)
POTASSIUM SERPL-SCNC: 4.1 MMOL/L — SIGNIFICANT CHANGE UP (ref 3.5–5.3)
PROT SERPL-MCNC: 5.3 G/DL — LOW (ref 6–8.3)
PROT SERPL-MCNC: 5.5 G/DL — LOW (ref 6–8.3)
PROT SERPL-MCNC: 5.7 G/DL — LOW (ref 6–8.3)
PTH-INTACT SERPL-MCNC: 2.73 PG/ML — LOW (ref 15–65)
RBC # BLD: 3.69 M/UL — LOW (ref 4.2–5.8)
RBC # FLD: 12.5 % — SIGNIFICANT CHANGE UP (ref 10.3–14.5)
SAO2 % BLDA: 99.1 % — HIGH (ref 95–99)
SODIUM BLDA-SCNC: 134 MMOL/L — LOW (ref 136–146)
SODIUM SERPL-SCNC: 140 MMOL/L — SIGNIFICANT CHANGE UP (ref 135–145)
WBC # BLD: 16.05 K/UL — HIGH (ref 3.8–10.5)
WBC # FLD AUTO: 16.05 K/UL — HIGH (ref 3.8–10.5)

## 2019-09-06 PROCEDURE — 71045 X-RAY EXAM CHEST 1 VIEW: CPT | Mod: 26

## 2019-09-06 PROCEDURE — 99233 SBSQ HOSP IP/OBS HIGH 50: CPT

## 2019-09-06 RX ORDER — MAGNESIUM SULFATE 500 MG/ML
2 VIAL (ML) INJECTION ONCE
Refills: 0 | Status: COMPLETED | OUTPATIENT
Start: 2019-09-06 | End: 2019-09-06

## 2019-09-06 RX ORDER — POTASSIUM CHLORIDE 20 MEQ
40 PACKET (EA) ORAL ONCE
Refills: 0 | Status: COMPLETED | OUTPATIENT
Start: 2019-09-06 | End: 2019-09-06

## 2019-09-06 RX ORDER — ACETAMINOPHEN 500 MG
650 TABLET ORAL EVERY 6 HOURS
Refills: 0 | Status: DISCONTINUED | OUTPATIENT
Start: 2019-09-06 | End: 2019-09-17

## 2019-09-06 RX ORDER — CALCIUM GLUCONATE 100 MG/ML
2 VIAL (ML) INTRAVENOUS ONCE
Refills: 0 | Status: COMPLETED | OUTPATIENT
Start: 2019-09-06 | End: 2019-09-06

## 2019-09-06 RX ORDER — CHLORHEXIDINE GLUCONATE 213 G/1000ML
15 SOLUTION TOPICAL EVERY 12 HOURS
Refills: 0 | Status: DISCONTINUED | OUTPATIENT
Start: 2019-09-06 | End: 2019-09-07

## 2019-09-06 RX ADMIN — Medication 81 MILLIGRAM(S): at 14:40

## 2019-09-06 RX ADMIN — Medication 2500 MILLIGRAM(S): at 22:01

## 2019-09-06 RX ADMIN — Medication 2500 MILLIGRAM(S): at 05:35

## 2019-09-06 RX ADMIN — Medication 100 MILLIGRAM(S): at 14:40

## 2019-09-06 RX ADMIN — Medication 125 MICROGRAM(S): at 05:46

## 2019-09-06 RX ADMIN — CALCITRIOL 0.5 MICROGRAM(S): 0.5 CAPSULE ORAL at 05:36

## 2019-09-06 RX ADMIN — Medication 40 MILLIEQUIVALENT(S): at 05:34

## 2019-09-06 RX ADMIN — Medication 50 GRAM(S): at 04:11

## 2019-09-06 RX ADMIN — Medication 2500 MILLIGRAM(S): at 14:39

## 2019-09-06 RX ADMIN — Medication 100 MILLIGRAM(S): at 22:02

## 2019-09-06 RX ADMIN — ENOXAPARIN SODIUM 40 MILLIGRAM(S): 100 INJECTION SUBCUTANEOUS at 14:40

## 2019-09-06 RX ADMIN — CALCITRIOL 0.5 MICROGRAM(S): 0.5 CAPSULE ORAL at 18:24

## 2019-09-06 RX ADMIN — Medication 100 MILLIGRAM(S): at 05:34

## 2019-09-06 RX ADMIN — Medication 200 GRAM(S): at 03:54

## 2019-09-06 RX ADMIN — SIMVASTATIN 20 MILLIGRAM(S): 20 TABLET, FILM COATED ORAL at 22:02

## 2019-09-06 RX ADMIN — AMLODIPINE BESYLATE 10 MILLIGRAM(S): 2.5 TABLET ORAL at 05:36

## 2019-09-06 RX ADMIN — CHLORHEXIDINE GLUCONATE 15 MILLILITER(S): 213 SOLUTION TOPICAL at 18:24

## 2019-09-06 RX ADMIN — CHLORHEXIDINE GLUCONATE 1 APPLICATION(S): 213 SOLUTION TOPICAL at 18:24

## 2019-09-06 RX ADMIN — Medication 25 MILLIGRAM(S): at 05:36

## 2019-09-06 RX ADMIN — CHLORHEXIDINE GLUCONATE 15 MILLILITER(S): 213 SOLUTION TOPICAL at 05:35

## 2019-09-06 NOTE — PROGRESS NOTE ADULT - SUBJECTIVE AND OBJECTIVE BOX
Chief Complaint: Patient is a 74y old  Male who presents with a chief complaint of shortness of breath (06 Sep 2019 09:45)    INTERVAL HPI/OVERNIGHT EVENTS: No acute overnight events, currently denies any pain, shortness of breath, nausea, vomiting, fever, or chills    MEDICATIONS  (STANDING):  amLODIPine   Tablet 10 milliGRAM(s) Oral daily  aspirin  chewable 81 milliGRAM(s) Enteral Tube daily  calcitriol  Solution 0.5 MICROGram(s) Oral <User Schedule>  calcium carbonate   Suspension 2500 milliGRAM(s) Oral three times a day  chlorhexidine 0.12% Liquid 15 milliLiter(s) Oral Mucosa every 12 hours  chlorhexidine 4% Liquid 1 Application(s) Topical daily  clindamycin IVPB      clindamycin IVPB 600 milliGRAM(s) IV Intermittent every 8 hours  enoxaparin Injectable 40 milliGRAM(s) SubCutaneous daily  hydrochlorothiazide 25 milliGRAM(s) Oral daily  levothyroxine 125 MICROGram(s) Oral daily  simvastatin 20 milliGRAM(s) Oral at bedtime    MEDICATIONS  (PRN):  acetaminophen    Suspension .. 650 milliGRAM(s) Oral every 6 hours PRN Mild Pain (1 - 3)  oxyCODONE    IR 5 milliGRAM(s) Oral every 3 hours PRN Moderate Pain (4 - 6)  oxyCODONE    IR 10 milliGRAM(s) Oral every 3 hours PRN Severe Pain (7 - 10)      Vital Signs Last 24 Hrs  T(C): 36.7 (06 Sep 2019 12:00), Max: 36.7 (06 Sep 2019 12:00)  T(F): 98 (06 Sep 2019 12:00), Max: 98 (06 Sep 2019 12:00)  HR: 83 (06 Sep 2019 14:00) (68 - 88)  BP: --  BP(mean): --  RR: 19 (06 Sep 2019 14:00) (12 - 21)  SpO2: 99% (06 Sep 2019 14:00) (93% - 100%)    I&O's Detail    05 Sep 2019 07:01  -  06 Sep 2019 07:00  --------------------------------------------------------  IN:    Enteral Tube Flush: 100 mL    lactated ringers.: 800 mL    ns in tub fed  [id number]: 25 mL    propofol Infusion: 64 mL  Total IN: 989 mL    OUT:    Bulb: 30 mL    Bulb: 5 mL    Bulb: 20 mL    Indwelling Catheter - Urethral: 480 mL  Total OUT: 535 mL    Total NET: 454 mL      06 Sep 2019 07:01  -  06 Sep 2019 15:34  --------------------------------------------------------  IN:    IV PiggyBack: 50 mL    ns in tub fed  [id number]: 255 mL  Total IN: 305 mL    OUT:    Bulb: 20 mL    Bulb: 20 mL    Indwelling Catheter - Urethral: 385 mL  Total OUT: 425 mL    Total NET: -120 mL    Physical Exam  Gen: sitting up in chair in NAD  Neuro: Alert, no focal deficits  HEENT: MMM, +trach   Pulm: clear bilaterally without wheezing  CV: regular rate, nl s1, s2, no murmurs appreciated  Abd: Soft, nontender, nondistended, normoactive BS  Ext: Warm, no significant edema      LABS:                        10.5   16.05 )-----------( 225      ( 06 Sep 2019 00:30 )             32.8     09-06    140  |  102  |  17  ----------------------------<  156<H>  4.1   |  28  |  0.93    Ca    8.0<L>      06 Sep 2019 07:20  Phos  3.2     09-06  Mg     2.4     09-06    TPro  5.3<L>  /  Alb  2.9<L>  /  TBili  0.4  /  DBili  x   /  AST  14  /  ALT  7   /  AlkPhos  47  09-06    LIVER FUNCTIONS - ( 06 Sep 2019 07:20 )  Alb: 2.9 g/dL / Pro: 5.3 g/dL / ALK PHOS: 47 u/L / ALT: 7 u/L / AST: 14 u/L / GGT: x     / T. Bili 0.4 mg/dL / D. Bili x         PT/INR - ( 05 Sep 2019 07:56 )   PT: 13.4 SEC;   INR: 1.20          PTT - ( 05 Sep 2019 07:56 )  PTT:35.5 SEC    ABG - ( 06 Sep 2019 00:30 )  pH, Arterial: 7.47  pH, Blood: x     /  pCO2: 42    /  pO2: 129   / HCO3: 30    / Base Excess: 6.3   /  SaO2: 99.1

## 2019-09-06 NOTE — PROGRESS NOTE ADULT - ASSESSMENT
POD1 from a total laryngectomy, b/l neck dissection, total thyroidectomy, with LRFF and STSG for reconstruction.    - Recovering well  - Flap doing well   - out of bed to chair today   - ok to start tube feeds per PRS perspective   - care per SICU    Plastic Surgery  [id number]

## 2019-09-06 NOTE — PROGRESS NOTE ADULT - ASSESSMENT
75 y/o man with HTN, HLD, carotid stenosis laryngeal ca with extralaryngeal spread who presented with SOB now s/p total laryngectomy, b/l neck dissection, total thyroidectomy, with LRFF and STSG for reconstruction.    #Laryngeal ca: s/p total laryngectomy, b/l neck dissection, total thyroidectomy, with LRFF and STSG for reconstruction on 9/5/19.   - Care per ENT/plastic surgery, appreciate input  - clindamycin perioperatively per ENT  - pain currently well controlled  - monitor for hypocalcemia    #HTN: continue home medication regimen  - HCTZ 25 mg daily  - amlodipine 10 mg daily  - monitor BP    #HLD: continue statin    #Carotid stenosis: continue aspirin    #Hypothyroidism: Continue synthroid    #DVT ppx: Lovenox subq

## 2019-09-06 NOTE — PROGRESS NOTE ADULT - SUBJECTIVE AND OBJECTIVE BOX
Patient seen and examined at the bedside. No acute events overnight. On trach collar, cuff down, NGT repositioned and confirmed in place.    T(C): 36.1 (09-06-19 @ 08:00), Max: 36.6 (09-05-19 @ 21:00)  HR: 68 (09-06-19 @ 09:00) (68 - 88)  BP: --  RR: 17 (09-06-19 @ 09:00) (12 - 21)  SpO2: 100% (09-06-19 @ 09:00) (97% - 100%)    NAD, awake and alert  Breathing comfortably on humidified TC   OC/OP: wnl  Neck: soft, flat, bilateral IDA with SS drainage. Incision c/d/i   6LPC with sutures, cuff deflated   Dannie doppler signal strong  L arm with ACE wrap, wound vac in place and IDA with scant drainage     A/P: 74M s/p TL, TT, bilateral SND, L RFFF, L STSG 9/4   -IS/CPT  -start continuous tube feeds  -IDA to bulb suction  -dannie doppler  -ok to dc xuan and fong  -floor when ok from plastics tomorrow?   -OOBTC   -PT/OT  -PM&R   -asa/lov  -clinda  -synthroid  -calcium repletions IV PRN  -max oral repletions  -q6h Ca checks  -endocrinology consult for co-managment of hypothyroid and hypoparathyroid post op

## 2019-09-06 NOTE — PROGRESS NOTE ADULT - SUBJECTIVE AND OBJECTIVE BOX
SICU Daily Progress Note  =====================================================  Interval/Overnight Events:    - Patient arrived in SICU overnight immediately post op laryngectomy, neck dissection, tracheostomy, reconstruction of anterior pharynx with radial forearm flap from left arm, skin graft from left thigh to forearm donor site, thyroidectomy with reimplantation   - Patient arrived paralyzed, on the ventilator  - Patient was lightly sedated with propofol and switched to Precedex in preparation for SBT this AM  - NGT readjusted by ENT overnight and placement confirmed by XR      POD #          	SICU Day #    ***    HPI:  ((insert from previous note)) ***    PMH:   ***    Allergies: penicillin (Rash)      MEDICATIONS:   --------------------------------------------------------------------------------------  Neurologic Medications  dexmedetomidine Infusion 0.05 MICROgram(s)/kG/Hr IV Continuous <Continuous>  HYDROmorphone  Injectable 0.5 milliGRAM(s) IV Push every 3 hours PRN refractory pain  oxyCODONE    IR 5 milliGRAM(s) Oral every 3 hours PRN Moderate Pain (4 - 6)  oxyCODONE    IR 10 milliGRAM(s) Oral every 3 hours PRN Severe Pain (7 - 10)  propofol Infusion 30 MICROgram(s)/kG/Min IV Continuous <Continuous>    Respiratory Medications    Cardiovascular Medications  amLODIPine   Tablet 10 milliGRAM(s) Oral daily  hydrochlorothiazide 25 milliGRAM(s) Oral daily    Gastrointestinal Medications  calcitriol  Solution 0.5 MICROGram(s) Oral <User Schedule>  calcium carbonate   Suspension 2500 milliGRAM(s) Oral three times a day  lactated ringers. 1000 milliLiter(s) IV Continuous <Continuous>    Genitourinary Medications    Hematologic/Oncologic Medications  aspirin  chewable 81 milliGRAM(s) Enteral Tube daily  enoxaparin Injectable 40 milliGRAM(s) SubCutaneous daily    Antimicrobial/Immunologic Medications  clindamycin IVPB      clindamycin IVPB 600 milliGRAM(s) IV Intermittent every 8 hours    Endocrine/Metabolic Medications  levothyroxine 125 MICROGram(s) Oral daily  simvastatin 20 milliGRAM(s) Oral at bedtime    Topical/Other Medications  chlorhexidine 0.12% Liquid 15 milliLiter(s) Oral Mucosa every 12 hours  chlorhexidine 4% Liquid 1 Application(s) Topical daily    --------------------------------------------------------------------------------------    VITAL SIGNS, INS/OUTS (last 24 hours):  --------------------------------------------------------------------------------------  ((Insert SICU Vitals/Is+Os here))***  --------------------------------------------------------------------------------------    EXAM  NEUROLOGY  RASS:   	GCS:    Exam: Normal, NAD, alert, oriented x3, no focal deficits. ***    HEENT  Exam: Normocephalic, atraumatic, EOMI.  ***    RESPIRATORY  Exam: Lungs clear to auscultation, Normal expansion/effort. ***  Mechanical Ventilation: Mode: AC/ CMV (Assist Control/ Continuous Mandatory Ventilation), RR (machine): 12, TV (machine): 450, FiO2: 40, PEEP: 5, MAP: 8, PIP: 16    CARDIOVASCULAR  Exam: S1, S2.  Regular rate and rhythm.   ***    GI/NUTRITION  Exam: Abdomen soft, Non-tender, Non-distended.  ***  Wound:  ***  Current Diet:  NPO***    VASCULAR  Exam: Extremities warm, pink, well-perfused. ***    MUSCULOSKELETAL  Exam: All extremities moving spontaneously without limitations. ***    SKIN  Exam: Good skin turgor, no skin breakdown. ***    METABOLIC/FLUIDS/ELECTROLYTES  calcitriol  Solution 0.5 MICROGram(s) Oral <User Schedule>  calcium carbonate   Suspension 2500 milliGRAM(s) Oral three times a day  lactated ringers. 1000 milliLiter(s) IV Continuous <Continuous>      HEMATOLOGIC  [x] VTE Prophylaxis: aspirin  chewable 81 milliGRAM(s) Enteral Tube daily  enoxaparin Injectable 40 milliGRAM(s) SubCutaneous daily    Transfusions:	[] PRBC	[] Platelets		[] FFP	[] Cryoprecipitate    INFECTIOUS DISEASE  Antimicrobials/Immunologic Medications:  clindamycin IVPB      clindamycin IVPB 600 milliGRAM(s) IV Intermittent every 8 hours    Day #      of     ***    Tubes/Lines/Drains  ***  [x] Peripheral IV  [] Central Venous Line     	[] R	[] L	[] IJ	[] Fem	[] SC	Date Placed:   [] Arterial Line		[] R	[] L	[] Fem	[] Rad	[] Ax	Date Placed:   [] PICC		[] Midline		[] Mediport  [] Urinary Catheter		Date Placed:   [x] Necessity of urinary, arterial, and venous catheters discussed    LABS  --------------------------------------------------------------------------------------  ((Insert SICU Labs here))***  --------------------------------------------------------------------------------------    OTHER LABORATORY:     IMAGING STUDIES:   CXR:     ASSESSMENT:  74y Male    ((insert from previous))***    PLAN:   ***  Neurologic:   Respiratory:   Cardiovascular:   Gastrointestinal/Nutrition:   Renal/Genitourinary:   Hematologic:   Infectious Disease:   Tubes/Lines/Drains:   Endocrine:   Disposition:     --------------------------------------------------------------------------------------    Critical Care Diagnoses: SICU Daily Progress Note  =====================================================  Interval/Overnight Events:    - Patient arrived in SICU overnight immediately post op laryngectomy, neck dissection, tracheostomy, reconstruction of anterior pharynx with radial forearm flap from left arm, skin graft from left thigh to forearm donor site, thyroidectomy with reimplantation   - Patient arrived paralyzed, on the ventilator  - Patient was lightly sedated with propofol and switched to Precedex in preparation for SBT this AM  - NGT readjusted by ENT overnight and placement confirmed by XR      POD #  1        	SICU Day #  1    HPI:  74 year old M with 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 He underwent laryngectomy, neck dissection, tracheostomy, reconstruction of anterior pharynx with radial forearm flap from left arm, skin graft from left thigh to forearm donor site, thyroidectomy with reimplantation with ENT and plastic surgery. Case was routine. Admitted to SICU paralyzed, mechanically ventilated via new tracheostomy, to have Q1H flap checks.   Allergies: penicillin (Rash)      MEDICATIONS:   --------------------------------------------------------------------------------------  Neurologic Medications  dexmedetomidine Infusion 0.05 MICROgram(s)/kG/Hr IV Continuous <Continuous>  HYDROmorphone  Injectable 0.5 milliGRAM(s) IV Push every 3 hours PRN refractory pain  oxyCODONE    IR 5 milliGRAM(s) Oral every 3 hours PRN Moderate Pain (4 - 6)  oxyCODONE    IR 10 milliGRAM(s) Oral every 3 hours PRN Severe Pain (7 - 10)  propofol Infusion 30 MICROgram(s)/kG/Min IV Continuous <Continuous>    Respiratory Medications    Cardiovascular Medications  amLODIPine   Tablet 10 milliGRAM(s) Oral daily  hydrochlorothiazide 25 milliGRAM(s) Oral daily    Gastrointestinal Medications  calcitriol  Solution 0.5 MICROGram(s) Oral <User Schedule>  calcium carbonate   Suspension 2500 milliGRAM(s) Oral three times a day  lactated ringers. 1000 milliLiter(s) IV Continuous <Continuous>    Genitourinary Medications    Hematologic/Oncologic Medications  aspirin  chewable 81 milliGRAM(s) Enteral Tube daily  enoxaparin Injectable 40 milliGRAM(s) SubCutaneous daily    Antimicrobial/Immunologic Medications  clindamycin IVPB      clindamycin IVPB 600 milliGRAM(s) IV Intermittent every 8 hours    Endocrine/Metabolic Medications  levothyroxine 125 MICROGram(s) Oral daily  simvastatin 20 milliGRAM(s) Oral at bedtime    Topical/Other Medications  chlorhexidine 0.12% Liquid 15 milliLiter(s) Oral Mucosa every 12 hours  chlorhexidine 4% Liquid 1 Application(s) Topical daily    --------------------------------------------------------------------------------------    VITAL SIGNS, INS/OUTS (last 24 hours):  --------------------------------------------------------------------------------------  T(C): 36.6 (09-06-19 @ 00:00), Max: 36.6 (09-05-19 @ 21:00)  HR: 84 (09-06-19 @ 00:00) (80 - 96)  BP: 143/62 (09-05-19 @ 06:41) (143/62 - 156/67)  BP(mean): --  ABP: 119/51 (09-06-19 @ 00:00) (119/51 - 134/55)  ABP(mean): 73 (09-06-19 @ 00:00) (73 - 81)  RR: 14 (09-06-19 @ 00:00) (13 - 21)  SpO2: 99% (09-06-19 @ 00:00) (99% - 100%)  Wt(kg): --  CVP(mm Hg): --  CI: --  CAPILLARY BLOOD GLUCOSE       N/A      --------------------------------------------------------------------------------------    EXAM  NEUROLOGY  RASS:   -2    Exam: Sedated on mechanical ventilator via trach    HEENT  Exam: Normocephalic, atraumatic. Neck incision C/D/I. Right and Left neck JPs with sanguinous output.     RESPIRATORY  Exam: Lungs clear to auscultation, Normal expansion/effort.   Mechanical Ventilation: Mode: AC/ CMV (Assist Control/ Continuous Mandatory Ventilation), RR (machine): 12, TV (machine): 450, FiO2: 40, PEEP: 5, MAP: 8, PIP: 16    CARDIOVASCULAR  Exam: S1, S2.  Regular rate and rhythm.     GI/NUTRITION  Exam: Abdomen soft, Non-tender, Non-distended.    Wound:  ***  Current Diet:  NPO***    VASCULAR  Exam: Extremities warm, pink, well-perfused. ***    MUSCULOSKELETAL  Exam: All extremities moving spontaneously without limitations. ***    SKIN  Exam: Good skin turgor, no skin breakdown. ***    METABOLIC/FLUIDS/ELECTROLYTES  calcitriol  Solution 0.5 MICROGram(s) Oral <User Schedule>  calcium carbonate   Suspension 2500 milliGRAM(s) Oral three times a day  lactated ringers. 1000 milliLiter(s) IV Continuous <Continuous>      HEMATOLOGIC  [x] VTE Prophylaxis: aspirin  chewable 81 milliGRAM(s) Enteral Tube daily  enoxaparin Injectable 40 milliGRAM(s) SubCutaneous daily    Transfusions:	[] PRBC	[] Platelets		[] FFP	[] Cryoprecipitate    INFECTIOUS DISEASE  Antimicrobials/Immunologic Medications:  clindamycin IVPB      clindamycin IVPB 600 milliGRAM(s) IV Intermittent every 8 hours    Day #      of     ***    Tubes/Lines/Drains  ***  [x] Peripheral IV  [] Central Venous Line     	[] R	[] L	[] IJ	[] Fem	[] SC	Date Placed:   [] Arterial Line		[] R	[] L	[] Fem	[] Rad	[] Ax	Date Placed:   [] PICC		[] Midline		[] Mediport  [] Urinary Catheter		Date Placed:   [x] Necessity of urinary, arterial, and venous catheters discussed    LABS  --------------------------------------------------------------------------------------  CBC Full  -  ( 06 Sep 2019 00:30 )  WBC Count : 16.05 K/uL  RBC Count : 3.69 M/uL  Hemoglobin : 10.5 g/dL  Hematocrit : 32.8 %  Platelet Count - Automated : 225 K/uL  Mean Cell Volume : 88.9 fL  Mean Cell Hemoglobin : 28.5 pg  Mean Cell Hemoglobin Concentration : 32.0 %  Auto Neutrophil # : x  Auto Lymphocyte # : x  Auto Monocyte # : x  Auto Eosinophil # : x  Auto Basophil # : x  Auto Neutrophil % : x  Auto Lymphocyte % : x  Auto Monocyte % : x  Auto Eosinophil % : x  Auto Basophil % : x  09-05    140  |  103  |  16  ----------------------------<  185<H>  3.7   |  27  |  1.06    Ca    8.3<L>      05 Sep 2019 21:11  Phos  2.9     09-05  Mg     1.6     09-05    TPro  7.9  /  Alb  4.2  /  TBili  0.4  /  DBili  x   /  AST  20  /  ALT  9   /  AlkPhos  79  09-04    --------------------------------------------------------------------------------------  ASSESSMENT:  74 year old M with laryngeal cancer thought to be secondary to chronic GERD with extralaryngeal spread, HTN, HLD s/p laryngectomy, neck dissection, tracheostomy, reconstruction of anterior pharynx with radial forearm flap from left arm, skin graft from left thigh to forearm donor site, thyroidectomy with reimplantation with ENT and plastic surgery admitted to SICU for new tracheostomy and Q1H flap checks.    PLAN:   Neurologic:   - Sedation with Precedex overnight, will wean for SBT today  - Oxycodone PRN for moderate to severe pain via PEG tube, IV Dilaudid overnight PRN    Respiratory:  - S/p laryngectomy, tracheostomy in place   - C/w mechanical ventilation via trach collar, VC , RR 12, PEEP 5, FiO2 40%  - Pressure support/ SBT in AM  - Q1H pharyngeal flap checks    Cardiovascular:   - BP monitoring, c/w home HTN medications Amlodipine and Losartan    Gastrointestinal/Nutrition:   - NPO, will confirm NGT placement w/ CXR then start tube feeds    Renal/Genitourinary:   - No issues  - LR at 75 mL/hr  - Trend BMP    Hematologic:   -  mg rectal then 81 mg daily for flap perfusion  - Lovenox for DVT prophylaxis    Infectious Disease:   - Penicillin allergic, continue with Clindamycin    Lines/Tubes:  PIV x 2, fong catheter, R radial arterial line    Endocrine:   - S/p total thyroidectomy- continue with PO Sythroid  - f/u PTH- re-implantation of parathytroid, expect stunning  - Q8H BMP, ionized calcium  - Calcium supplementation- Calcitriol 0.5 mcg BID, Calcium carbonate 2500 mg TID    Disposition: SICU    --------------------------------------------------------------------------------------  Critical Care Diagnoses: Laryngeal cancer, respiratory failure, parathyroid dysfunction/ hypocalcemia SICU Daily Progress Note  =====================================================  Interval/Overnight Events:    - Patient arrived in SICU overnight immediately post op laryngectomy, neck dissection, tracheostomy, reconstruction of anterior pharynx with radial forearm flap from left arm, skin graft from left thigh to forearm donor site, thyroidectomy with reimplantation   - Patient arrived paralyzed, on the ventilator  - Patient was lightly sedated with propofol and switched to Precedex in preparation for SBT this AM  - NGT readjusted by ENT overnight and placement confirmed by XR      POD #  1        	SICU Day #  1    HPI:  74 year old M with 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 He underwent laryngectomy, neck dissection, tracheostomy, reconstruction of anterior pharynx with radial forearm flap from left arm, skin graft from left thigh to forearm donor site, thyroidectomy with reimplantation with ENT and plastic surgery. Case was routine. Admitted to SICU paralyzed, mechanically ventilated via new tracheostomy, to have Q1H flap checks.   Allergies: penicillin (Rash)      MEDICATIONS:   --------------------------------------------------------------------------------------  Neurologic Medications  dexmedetomidine Infusion 0.05 MICROgram(s)/kG/Hr IV Continuous <Continuous>  HYDROmorphone  Injectable 0.5 milliGRAM(s) IV Push every 3 hours PRN refractory pain  oxyCODONE    IR 5 milliGRAM(s) Oral every 3 hours PRN Moderate Pain (4 - 6)  oxyCODONE    IR 10 milliGRAM(s) Oral every 3 hours PRN Severe Pain (7 - 10)  propofol Infusion 30 MICROgram(s)/kG/Min IV Continuous <Continuous>    Respiratory Medications    Cardiovascular Medications  amLODIPine   Tablet 10 milliGRAM(s) Oral daily  hydrochlorothiazide 25 milliGRAM(s) Oral daily    Gastrointestinal Medications  calcitriol  Solution 0.5 MICROGram(s) Oral <User Schedule>  calcium carbonate   Suspension 2500 milliGRAM(s) Oral three times a day  lactated ringers. 1000 milliLiter(s) IV Continuous <Continuous>    Genitourinary Medications    Hematologic/Oncologic Medications  aspirin  chewable 81 milliGRAM(s) Enteral Tube daily  enoxaparin Injectable 40 milliGRAM(s) SubCutaneous daily    Antimicrobial/Immunologic Medications  clindamycin IVPB      clindamycin IVPB 600 milliGRAM(s) IV Intermittent every 8 hours    Endocrine/Metabolic Medications  levothyroxine 125 MICROGram(s) Oral daily  simvastatin 20 milliGRAM(s) Oral at bedtime    Topical/Other Medications  chlorhexidine 0.12% Liquid 15 milliLiter(s) Oral Mucosa every 12 hours  chlorhexidine 4% Liquid 1 Application(s) Topical daily    --------------------------------------------------------------------------------------    VITAL SIGNS, INS/OUTS (last 24 hours):  --------------------------------------------------------------------------------------  T(C): 36.6 (09-06-19 @ 00:00), Max: 36.6 (09-05-19 @ 21:00)  HR: 84 (09-06-19 @ 00:00) (80 - 96)  BP: 143/62 (09-05-19 @ 06:41) (143/62 - 156/67)  BP(mean): --  ABP: 119/51 (09-06-19 @ 00:00) (119/51 - 134/55)  ABP(mean): 73 (09-06-19 @ 00:00) (73 - 81)  RR: 14 (09-06-19 @ 00:00) (13 - 21)  SpO2: 99% (09-06-19 @ 00:00) (99% - 100%)  Wt(kg): --  CVP(mm Hg): --  CI: --  CAPILLARY BLOOD GLUCOSE       N/A      --------------------------------------------------------------------------------------    EXAM  NEUROLOGY  RASS:   -2    Exam: Sedated on mechanical ventilator via trach    HEENT  Exam: Normocephalic, atraumatic. Neck incision C/D/I. Right and Left neck JPs with sanguinous output.     RESPIRATORY  Exam: Lungs clear to auscultation, Normal expansion/effort.   Mechanical Ventilation: Mode: AC/ CMV (Assist Control/ Continuous Mandatory Ventilation), RR (machine): 12, TV (machine): 450, FiO2: 40, PEEP: 5, MAP: 8, PIP: 16    CARDIOVASCULAR  Exam: S1, S2.  Regular rate and rhythm.     GI/NUTRITION  Exam: Abdomen soft, Non-tender, Non-distended.  NGT sutured in place.    Current Diet:  NPO    VASCULAR  Exam: Extremities warm, pink, well-perfused.     MUSCULOSKELETAL  Exam: L thigh donor site wrapped with ACE bandage, C/D/I. Right forearm wrapped in ACE and IDA drain with sanguinous output. All extremities moving spontaneously without limitations.     SKIN  Exam: Good skin turgor, no skin breakdown.   METABOLIC/FLUIDS/ELECTROLYTES  calcitriol  Solution 0.5 MICROGram(s) Oral <User Schedule>  calcium carbonate   Suspension 2500 milliGRAM(s) Oral three times a day  lactated ringers. 1000 milliLiter(s) IV Continuous <Continuous>      HEMATOLOGIC  [x] VTE Prophylaxis: aspirin  chewable 81 milliGRAM(s) Enteral Tube daily  enoxaparin Injectable 40 milliGRAM(s) SubCutaneous daily    Transfusions:	[] PRBC	[] Platelets		[] FFP	[] Cryoprecipitate    INFECTIOUS DISEASE  Antimicrobials/Immunologic Medications:  clindamycin IVPB      clindamycin IVPB 600 milliGRAM(s) IV Intermittent every 8 hours    Day #  1    of     Clindamycin    Tubes/Lines/Drains   [x] Peripheral IV  [] Central Venous Line     	[] R	[] L	[] IJ	[] Fem	[] SC	Date Placed:   [x] Arterial Line		[x] R	[] L	[] Fem	[x] Rad	[] Ax	Date Placed:   [] PICC		[] Midline		[] Mediport  [x] Urinary Catheter		Date Placed:   [x] Necessity of urinary, arterial, and venous catheters discussed    LABS  --------------------------------------------------------------------------------------  CBC Full  -  ( 06 Sep 2019 00:30 )  WBC Count : 16.05 K/uL  RBC Count : 3.69 M/uL  Hemoglobin : 10.5 g/dL  Hematocrit : 32.8 %  Platelet Count - Automated : 225 K/uL  Mean Cell Volume : 88.9 fL  Mean Cell Hemoglobin : 28.5 pg  Mean Cell Hemoglobin Concentration : 32.0 %  Auto Neutrophil # : x  Auto Lymphocyte # : x  Auto Monocyte # : x  Auto Eosinophil # : x  Auto Basophil # : x  Auto Neutrophil % : x  Auto Lymphocyte % : x  Auto Monocyte % : x  Auto Eosinophil % : x  Auto Basophil % : x  09-05    140  |  103  |  16  ----------------------------<  185<H>  3.7   |  27  |  1.06    Ca    8.3<L>      05 Sep 2019 21:11  Phos  2.9     09-05  Mg     1.6     09-05    TPro  7.9  /  Alb  4.2  /  TBili  0.4  /  DBili  x   /  AST  20  /  ALT  9   /  AlkPhos  79  09-04    --------------------------------------------------------------------------------------  ASSESSMENT:  74 year old M with laryngeal cancer thought to be secondary to chronic GERD with extralaryngeal spread, HTN, HLD s/p laryngectomy, neck dissection, tracheostomy, reconstruction of anterior pharynx with radial forearm flap from left arm, skin graft from left thigh to forearm donor site, thyroidectomy with reimplantation with ENT and plastic surgery admitted to SICU for new tracheostomy and Q1H flap checks.    PLAN:   Neurologic:   - Sedation with Precedex overnight, will wean for SBT today  - Oxycodone PRN for moderate to severe pain via PEG tube, IV Dilaudid overnight PRN    Respiratory:  - S/p laryngectomy, tracheostomy in place   - C/w mechanical ventilation via trach collar, VC , RR 12, PEEP 5, FiO2 40%  - Pressure support/ SBT in AM  - Q1H pharyngeal flap checks    Cardiovascular:   - BP monitoring, c/w home HTN medications Amlodipine and Losartan    Gastrointestinal/Nutrition:   - NPO, will confirm NGT placement w/ CXR then start tube feeds    Renal/Genitourinary:   - No issues  - LR at 75 mL/hr  - Trend BMP    Hematologic:   -  mg rectal given post op  - 81 mg daily for flap perfusion  - Lovenox for DVT prophylaxis    Infectious Disease:   - Penicillin allergic, continue with Clindamycin    Lines/Tubes:  PIV x 2, fong catheter, R radial arterial line    Endocrine:   - S/p total thyroidectomy- continue with PO Synthroid  - f/u PTH- re-implantation of parathyroid, expect stunning  - Q8H BMP, ionized calcium  - Calcium supplementation- Calcitriol 0.5 mcg BID, Calcium carbonate 2500 mg TID    Disposition: SICU    --------------------------------------------------------------------------------------  Critical Care Diagnoses: Laryngeal cancer, respiratory failure, parathyroid dysfunction/ hypocalcemia SICU Daily Progress Note  =====================================================  Interval/Overnight Events:    - Patient arrived in SICU overnight immediately post op laryngectomy, neck dissection, tracheostomy, reconstruction of anterior pharynx with radial forearm flap from left arm, skin graft from left thigh to forearm donor site, thyroidectomy with reimplantation   - Patient arrived paralyzed, on the ventilator  - Patient was lightly sedated with propofol and switched to Precedex in preparation for SBT this AM  - NGT readjusted by ENT overnight and placement confirmed by XR      POD #  1        	SICU Day #  1    HPI:  74 year old M with 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 He underwent laryngectomy, neck dissection, tracheostomy, reconstruction of anterior pharynx with radial forearm flap from left arm, skin graft from left thigh to forearm donor site, thyroidectomy with reimplantation with ENT and plastic surgery. Case was routine. Admitted to SICU paralyzed, mechanically ventilated via new tracheostomy, to have Q1H flap checks.   Allergies: penicillin (Rash)      MEDICATIONS:   --------------------------------------------------------------------------------------  Neurologic Medications  dexmedetomidine Infusion 0.05 MICROgram(s)/kG/Hr IV Continuous <Continuous>  HYDROmorphone  Injectable 0.5 milliGRAM(s) IV Push every 3 hours PRN refractory pain  oxyCODONE    IR 5 milliGRAM(s) Oral every 3 hours PRN Moderate Pain (4 - 6)  oxyCODONE    IR 10 milliGRAM(s) Oral every 3 hours PRN Severe Pain (7 - 10)  propofol Infusion 30 MICROgram(s)/kG/Min IV Continuous <Continuous>    Respiratory Medications    Cardiovascular Medications  amLODIPine   Tablet 10 milliGRAM(s) Oral daily  hydrochlorothiazide 25 milliGRAM(s) Oral daily    Gastrointestinal Medications  calcitriol  Solution 0.5 MICROGram(s) Oral <User Schedule>  calcium carbonate   Suspension 2500 milliGRAM(s) Oral three times a day  lactated ringers. 1000 milliLiter(s) IV Continuous <Continuous>    Genitourinary Medications    Hematologic/Oncologic Medications  aspirin  chewable 81 milliGRAM(s) Enteral Tube daily  enoxaparin Injectable 40 milliGRAM(s) SubCutaneous daily    Antimicrobial/Immunologic Medications  clindamycin IVPB      clindamycin IVPB 600 milliGRAM(s) IV Intermittent every 8 hours    Endocrine/Metabolic Medications  levothyroxine 125 MICROGram(s) Oral daily  simvastatin 20 milliGRAM(s) Oral at bedtime    Topical/Other Medications  chlorhexidine 0.12% Liquid 15 milliLiter(s) Oral Mucosa every 12 hours  chlorhexidine 4% Liquid 1 Application(s) Topical daily    --------------------------------------------------------------------------------------    VITAL SIGNS, INS/OUTS (last 24 hours):  --------------------------------------------------------------------------------------  T(C): 36.6 (09-06-19 @ 00:00), Max: 36.6 (09-05-19 @ 21:00)  HR: 84 (09-06-19 @ 00:00) (80 - 96)  BP: 143/62 (09-05-19 @ 06:41) (143/62 - 156/67)  BP(mean): --  ABP: 119/51 (09-06-19 @ 00:00) (119/51 - 134/55)  ABP(mean): 73 (09-06-19 @ 00:00) (73 - 81)  RR: 14 (09-06-19 @ 00:00) (13 - 21)  SpO2: 99% (09-06-19 @ 00:00) (99% - 100%)  Wt(kg): --  CVP(mm Hg): --  CI: --  CAPILLARY BLOOD GLUCOSE       N/A      --------------------------------------------------------------------------------------    EXAM  NEUROLOGY  RASS:   -2    Exam: Sedated on mechanical ventilator via trach    HEENT  Exam: Normocephalic, atraumatic. Neck incision C/D/I. Right and Left neck JPs with sanguinous output.     RESPIRATORY  Exam: Lungs clear to auscultation, Normal expansion/effort.   Mechanical Ventilation: Mode: AC/ CMV (Assist Control/ Continuous Mandatory Ventilation), RR (machine): 12, TV (machine): 450, FiO2: 40, PEEP: 5, MAP: 8, PIP: 16    CARDIOVASCULAR  Exam: S1, S2.  Regular rate and rhythm.     GI/NUTRITION  Exam: Abdomen soft, Non-tender, Non-distended.  NGT sutured in place.    Current Diet:  NPO    VASCULAR  Exam: Extremities warm, pink, well-perfused.     MUSCULOSKELETAL  Exam: L thigh donor site wrapped with ACE bandage, C/D/I. Right forearm wrapped in ACE and IDA drain with sanguinous output. All extremities moving spontaneously without limitations.     SKIN  Exam: Good skin turgor, no skin breakdown.   METABOLIC/FLUIDS/ELECTROLYTES  calcitriol  Solution 0.5 MICROGram(s) Oral <User Schedule>  calcium carbonate   Suspension 2500 milliGRAM(s) Oral three times a day  lactated ringers. 1000 milliLiter(s) IV Continuous <Continuous>      HEMATOLOGIC  [x] VTE Prophylaxis: aspirin  chewable 81 milliGRAM(s) Enteral Tube daily  enoxaparin Injectable 40 milliGRAM(s) SubCutaneous daily    Transfusions:	[] PRBC	[] Platelets		[] FFP	[] Cryoprecipitate    INFECTIOUS DISEASE  Antimicrobials/Immunologic Medications:  clindamycin IVPB      clindamycin IVPB 600 milliGRAM(s) IV Intermittent every 8 hours    Day #  1    of     Clindamycin    Tubes/Lines/Drains   [x] Peripheral IV  [] Central Venous Line     	[] R	[] L	[] IJ	[] Fem	[] SC	Date Placed:   [x] Arterial Line		[x] R	[] L	[] Fem	[x] Rad	[] Ax	Date Placed:   [] PICC		[] Midline		[] Mediport  [x] Urinary Catheter		Date Placed:   [x] Necessity of urinary, arterial, and venous catheters discussed    LABS  --------------------------------------------------------------------------------------  CBC Full  -  ( 06 Sep 2019 00:30 )  WBC Count : 16.05 K/uL  RBC Count : 3.69 M/uL  Hemoglobin : 10.5 g/dL  Hematocrit : 32.8 %  Platelet Count - Automated : 225 K/uL  Mean Cell Volume : 88.9 fL  Mean Cell Hemoglobin : 28.5 pg  Mean Cell Hemoglobin Concentration : 32.0 %  Auto Neutrophil # : x  Auto Lymphocyte # : x  Auto Monocyte # : x  Auto Eosinophil # : x  Auto Basophil # : x  Auto Neutrophil % : x  Auto Lymphocyte % : x  Auto Monocyte % : x  Auto Eosinophil % : x  Auto Basophil % : x  09-05    140  |  103  |  16  ----------------------------<  185<H>  3.7   |  27  |  1.06    Ca    8.3<L>      05 Sep 2019 21:11  Phos  2.9     09-05  Mg     1.6     09-05    TPro  7.9  /  Alb  4.2  /  TBili  0.4  /  DBili  x   /  AST  20  /  ALT  9   /  AlkPhos  79  09-04    --------------------------------------------------------------------------------------  ASSESSMENT:  74 year old M with laryngeal cancer thought to be secondary to chronic GERD with extralaryngeal spread, HTN, HLD s/p laryngectomy, neck dissection, tracheostomy, reconstruction of anterior pharynx with radial forearm flap from left arm, skin graft from left thigh to forearm donor site, thyroidectomy with reimplantation with ENT and plastic surgery admitted to SICU for new tracheostomy and Q1H flap checks.    PLAN:   Neurologic:   - Sedation with Precedex overnight, will wean for SBT today  - Oxycodone PRN for moderate to severe pain via PEG tube, IV Dilaudid overnight PRN    Respiratory:  - S/p laryngectomy, tracheostomy in place   - C/w mechanical ventilation via trach collar, VC , RR 12, PEEP 5, FiO2 40%  - Pressure support/ SBT in AM  - Q1H pharyngeal flap checks    Cardiovascular:   - BP monitoring, c/w home HTN medications Amlodipine and Losartan    Gastrointestinal/Nutrition:   - NPO, NGT placement confirmed w/ CXR   - will start tube feeds    Renal/Genitourinary:   - No issues  - LR at 75 mL/hr  - Trend BMP    Hematologic:   -  mg rectal given post op  - 81 mg daily for flap perfusion  - Lovenox for DVT prophylaxis    Infectious Disease:   - Penicillin allergic, continue with Clindamycin    Lines/Tubes:  PIV x 2, fong catheter, R radial arterial line    Endocrine:   - S/p total thyroidectomy- continue with PO Synthroid  - f/u PTH- re-implantation of parathyroid, expect stunning  - Q8H BMP, ionized calcium  - Calcium supplementation- Calcitriol 0.5 mcg BID, Calcium carbonate 2500 mg TID    Disposition: SICU    --------------------------------------------------------------------------------------  Critical Care Diagnoses: Laryngeal cancer, respiratory failure, parathyroid dysfunction/ hypocalcemia SICU Daily Progress Note  =====================================================  Interval/Overnight Events:    - Patient arrived in SICU overnight immediately post op laryngectomy, neck dissection, tracheostomy, reconstruction of anterior pharynx with radial forearm flap from left arm, skin graft from left thigh to forearm donor site, thyroidectomy with reimplantation   - Patient arrived paralyzed, on the ventilator  - Patient was lightly sedated with propofol and switched to Precedex in preparation for SBT this AM  - NGT readjusted by ENT overnight and placement confirmed by XR      POD #  1        	SICU Day #  1    HPI:  74 year old M with 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 He underwent laryngectomy, neck dissection, tracheostomy, reconstruction of anterior pharynx with radial forearm flap from left arm, skin graft from left thigh to forearm donor site, thyroidectomy with reimplantation with ENT and plastic surgery. Case was routine. Admitted to SICU paralyzed, mechanically ventilated via new tracheostomy, to have Q1H flap checks.   Allergies: penicillin (Rash)      MEDICATIONS:   --------------------------------------------------------------------------------------  Neurologic Medications  dexmedetomidine Infusion 0.05 MICROgram(s)/kG/Hr IV Continuous <Continuous>  HYDROmorphone  Injectable 0.5 milliGRAM(s) IV Push every 3 hours PRN refractory pain  oxyCODONE    IR 5 milliGRAM(s) Oral every 3 hours PRN Moderate Pain (4 - 6)  oxyCODONE    IR 10 milliGRAM(s) Oral every 3 hours PRN Severe Pain (7 - 10)  propofol Infusion 30 MICROgram(s)/kG/Min IV Continuous <Continuous>    Respiratory Medications    Cardiovascular Medications  amLODIPine   Tablet 10 milliGRAM(s) Oral daily  hydrochlorothiazide 25 milliGRAM(s) Oral daily    Gastrointestinal Medications  calcitriol  Solution 0.5 MICROGram(s) Oral <User Schedule>  calcium carbonate   Suspension 2500 milliGRAM(s) Oral three times a day  lactated ringers. 1000 milliLiter(s) IV Continuous <Continuous>    Genitourinary Medications    Hematologic/Oncologic Medications  aspirin  chewable 81 milliGRAM(s) Enteral Tube daily  enoxaparin Injectable 40 milliGRAM(s) SubCutaneous daily    Antimicrobial/Immunologic Medications  clindamycin IVPB      clindamycin IVPB 600 milliGRAM(s) IV Intermittent every 8 hours    Endocrine/Metabolic Medications  levothyroxine 125 MICROGram(s) Oral daily  simvastatin 20 milliGRAM(s) Oral at bedtime    Topical/Other Medications  chlorhexidine 0.12% Liquid 15 milliLiter(s) Oral Mucosa every 12 hours  chlorhexidine 4% Liquid 1 Application(s) Topical daily    --------------------------------------------------------------------------------------    VITAL SIGNS, INS/OUTS (last 24 hours):  --------------------------------------------------------------------------------------  T(C): 36.1 (09-06-19 @ 08:00), Max: 36.6 (09-05-19 @ 21:00)  T(F): 97 (09-06-19 @ 08:00), Max: 97.9 (09-05-19 @ 21:00)  HR: 70 (09-06-19 @ 10:00) (68 - 88)  BP: --  ABP: 120/47 (09-06-19 @ 10:00) (113/47 - 134/55)  ABP(mean): 71 (09-06-19 @ 10:00) (68 - 81)  RR: 13 (09-06-19 @ 10:00) (12 - 21)  SpO2: 100% (09-06-19 @ 10:00) (97% - 100%)      09-05 @ 07:01  -  09-06 @ 07:00  --------------------------------------------------------  IN: 989 mL / OUT: 535 mL / NET: 454 mL    09-06 @ 07:01  -  09-06 @ 10:12  --------------------------------------------------------  IN: 75 mL / OUT: 250 mL / NET: -175 mL      --------------------------------------------------------------------------------------    EXAM  NEUROLOGY  RASS:   -2    Exam: Sedated on mechanical ventilator via trach    HEENT  Exam: Normocephalic, atraumatic. Neck incision C/D/I. Right and Left neck JPs with sanguinous output.     RESPIRATORY  Exam: Lungs clear to auscultation, Normal expansion/effort.   Mechanical Ventilation: Mode: AC/ CMV (Assist Control/ Continuous Mandatory Ventilation), RR (machine): 12, TV (machine): 450, FiO2: 40, PEEP: 5, MAP: 8, PIP: 16    CARDIOVASCULAR  Exam: S1, S2.  Regular rate and rhythm.     GI/NUTRITION  Exam: Abdomen soft, Non-tender, Non-distended.  NGT sutured in place.    Current Diet:  NPO    VASCULAR  Exam: Extremities warm, pink, well-perfused.     MUSCULOSKELETAL  Exam: L thigh donor site wrapped with ACE bandage, C/D/I. Right forearm wrapped in ACE and IDA drain with sanguinous output. All extremities moving spontaneously without limitations.     SKIN  Exam: Good skin turgor, no skin breakdown.   METABOLIC/FLUIDS/ELECTROLYTES  calcitriol  Solution 0.5 MICROGram(s) Oral <User Schedule>  calcium carbonate   Suspension 2500 milliGRAM(s) Oral three times a day  lactated ringers. 1000 milliLiter(s) IV Continuous <Continuous>      HEMATOLOGIC  [x] VTE Prophylaxis: aspirin  chewable 81 milliGRAM(s) Enteral Tube daily  enoxaparin Injectable 40 milliGRAM(s) SubCutaneous daily    Transfusions:	[] PRBC	[] Platelets		[] FFP	[] Cryoprecipitate    INFECTIOUS DISEASE  Antimicrobials/Immunologic Medications:  clindamycin IVPB      clindamycin IVPB 600 milliGRAM(s) IV Intermittent every 8 hours    Day #  1    of     Clindamycin    Tubes/Lines/Drains   [x] Peripheral IV  [] Central Venous Line     	[] R	[] L	[] IJ	[] Fem	[] SC	Date Placed:   [x] Arterial Line		[x] R	[] L	[] Fem	[x] Rad	[] Ax	Date Placed:   [] PICC		[] Midline		[] Mediport  [x] Urinary Catheter		Date Placed:   [x] Necessity of urinary, arterial, and venous catheters discussed    LABS  --------------------------------------------------------------------------------------  CBC Full  -  ( 06 Sep 2019 00:30 )  WBC Count : 16.05 K/uL  RBC Count : 3.69 M/uL  Hemoglobin : 10.5 g/dL  Hematocrit : 32.8 %  Platelet Count - Automated : 225 K/uL  Mean Cell Volume : 88.9 fL  Mean Cell Hemoglobin : 28.5 pg  Mean Cell Hemoglobin Concentration : 32.0 %  Auto Neutrophil # : x  Auto Lymphocyte # : x  Auto Monocyte # : x  Auto Eosinophil # : x  Auto Basophil # : x  Auto Neutrophil % : x  Auto Lymphocyte % : x  Auto Monocyte % : x  Auto Eosinophil % : x  Auto Basophil % : x                          10.5   16.05 )-----------( 225      ( 06 Sep 2019 00:30 )             32.8     09-06    140  |  102  |  17  ----------------------------<  156<H>  4.1   |  28  |  0.93    Ca    8.0<L>      06 Sep 2019 07:20  Phos  3.2     09-06  Mg     2.4     09-06    TPro  5.3<L>  /  Alb  2.9<L>  /  TBili  0.4  /  DBili  x   /  AST  14  /  ALT  7   /  AlkPhos  47  09-06    PT/INR - ( 05 Sep 2019 07:56 )   PT: 13.4 SEC;   INR: 1.20          PTT - ( 05 Sep 2019 07:56 )  PTT:35.5 SEC    --------------------------------------------------------------------------------------  ASSESSMENT:  74 year old M with laryngeal cancer thought to be secondary to chronic GERD with extralaryngeal spread, HTN, HLD s/p laryngectomy, neck dissection, tracheostomy, reconstruction of anterior pharynx with radial forearm flap from left arm, skin graft from left thigh to forearm donor site, thyroidectomy with reimplantation with ENT and plastic surgery admitted to SICU for new tracheostomy and Q1H flap checks.    PLAN:   Neurologic:   - Sedation with Precedex overnight, will wean for SBT today  - Oxycodone PRN for moderate to severe pain via PEG tube, IV Dilaudid overnight PRN    Respiratory:  - S/p laryngectomy, tracheostomy in place   - C/w mechanical ventilation via trach collar, VC , RR 12, PEEP 5, FiO2 40%  - Pressure support/ SBT in AM  - Q1H pharyngeal flap checks    Cardiovascular:   - BP monitoring, c/w home HTN medications Amlodipine and Losartan    Gastrointestinal/Nutrition:   - NPO, NGT placement confirmed w/ CXR   - will start tube feeds    Renal/Genitourinary:   - No issues  - LR at 75 mL/hr  - Trend BMP    Hematologic:   -  mg rectal given post op  - 81 mg daily for flap perfusion  - Lovenox for DVT prophylaxis    Infectious Disease:   - Penicillin allergic, continue with Clindamycin    Lines/Tubes:  PIV x 2, fong catheter, R radial arterial line    Endocrine:   - S/p total thyroidectomy- continue with PO Synthroid  - PTH low, as expected due to stunning, continue to replete calcium   - Q8H BMP, ionized calcium  - Calcium supplementation- Calcitriol 0.5 mcg BID, Calcium carbonate 2500 mg TID    Disposition: SICU    --------------------------------------------------------------------------------------  Critical Care Diagnoses: Laryngeal cancer, respiratory failure, parathyroid dysfunction/ hypocalcemia

## 2019-09-06 NOTE — PROGRESS NOTE ADULT - ATTENDING COMMENTS
74 year old M with laryngeal cancer thought to be secondary to chronic GERD with extralaryngeal spread, HTN, HLD s/p laryngectomy, neck dissection, tracheostomy, reconstruction of anterior pharynx with radial forearm flap from left arm, skin graft from left thigh to forearm donor site, thyroidectomy with reimplantation with ENT and plastic surgery admitted to SICU for new tracheostomy and Q1H flap checks.    PLAN:   Neurologic:   - Sedation with Precedex overnight, will wean for SBT today  - Oxycodone PRN for moderate to severe pain via PEG tube, IV Dilaudid overnight PRN    Respiratory:  - S/p laryngectomy, tracheostomy in place   - C/w mechanical ventilation via trach collar, VC , RR 12, PEEP 5, FiO2 40%  - Pressure support/ SBT in AM  - Q1H pharyngeal flap checks    Cardiovascular:   - BP monitoring, c/w home HTN medications Amlodipine and Losartan    Gastrointestinal/Nutrition:   - NPO, NGT placement confirmed w/ CXR   - will start tube feeds    Renal/Genitourinary:   - No issues  - LR at 75 mL/hr  - Trend BMP    Hematologic:   -  mg rectal given post op  - 81 mg daily for flap perfusion  - Lovenox for DVT prophylaxis    Infectious Disease:   - Penicillin allergic, continue with Clindamycin    Lines/Tubes:  PIV x 2, fong catheter, R radial arterial line    Endocrine:   - S/p total thyroidectomy- continue with PO Synthroid  - PTH low, as expected due to stunning, continue to replete calcium   - Q8H BMP, ionized calcium  - Calcium supplementation- Calcitriol 0.5 mcg BID, Calcium carbonate 2500 mg TID    Disposition: SICU    --------------------------------------------------------------------------------------  Critical Care Diagnoses: Laryngeal cancer, respiratory failure, parathyroid dysfunction/ hypocalcemia     The patient is a critical care patient with life threatening hemodynamic and metabolic instability in SICU.  I have personally interviewed when possible and examined the patient, reviewed data and laboratory tests/x-rays and all pertinent electronic images.  I was physically present for the key portions of the evaluation and management (E/M) service provided.   The SICU team has a constant risk benefit analyzes discussion with the primary team, all consultants, House Staff and PA's on all decisions.  These diagnoses are unrelated to the surgical procedure noted above.  I meet with family if needed to get further history, discuss the case and make care decisions for this patient who might not be able to participate.  Time involved in performance of separately billable procedures was not counted toward my critical care time. There is no overlap.  I spent 55-75 minutes ( 0800Hrs-0915Hrs in AM/ 1600Hrs-1715Hrs in PM, or other time indicated) of critical care time for the diagnoses, assessment, plan and interventions.  This time excludes time spent on separate procedures and teaching. 74 year old M with laryngeal cancer thought to be secondary to chronic GERD with extralaryngeal spread, HTN, HLD s/p laryngectomy, neck dissection, tracheostomy, reconstruction of anterior pharynx with radial forearm flap from left arm, skin graft from left thigh to forearm donor site, thyroidectomy with reimplantation with ENT and plastic surgery admitted to SICU for new tracheostomy and Q1H flap checks.    PLAN:   Neurologic:   - Sedation with Precedex overnight, will wean for SBT today  - Oxycodone PRN for moderate to severe pain via PEG tube, IV Dilaudid overnight PRN    Respiratory:  - S/p laryngectomy, tracheostomy in place   - C/w mechanical ventilation via trach collar, VC , RR 12, PEEP 5, FiO2 40%  - Pressure support/ SBT in AM  - Q1H pharyngeal flap checks    Cardiovascular:   - BP monitoring, c/w home HTN medications Amlodipine and Losartan    Gastrointestinal/Nutrition:   - NPO, NGT placement confirmed w/ CXR   - will start tube feeds    Renal/Genitourinary:   - No issues  - LR at 75 mL/hr  - Trend BMP    Hematologic:   -  mg rectal given post op  - 81 mg daily for flap perfusion  - Lovenox for DVT prophylaxis    Infectious Disease:   - Penicillin allergic, continue with Clindamycin    Lines/Tubes:  PIV x 2, fong catheter, R radial arterial line    Endocrine:   - S/p total thyroidectomy- continue with PO Synthroid  - PTH low, as expected due to stunning, continue to replete calcium   - Q8H BMP, ionized calcium  - Calcium supplementation- Calcitriol 0.5 mcg BID, Calcium carbonate 2500 mg TID    Disposition: SICU    --------------------------------------------------------------------------------------  Critical Care Diagnoses: Laryngeal cancer, respiratory failure, parathyroid dysfunction/ hypocalcemia     The patient is a critical care patient with life threatening hemodynamic and metabolic instability in SICU.  I have personally interviewed when possible and examined the patient, reviewed data and laboratory tests/x-rays and all pertinent electronic images.  I was physically present for the key portions of the evaluation and management (E/M) service provided.   The SICU team has a constant risk benefit analyzes discussion with the primary team, all consultants, House Staff and PA's on all decisions.  These diagnoses are unrelated to the surgical procedure noted above.  I meet with family if needed to get further history, discuss the case and make care decisions for this patient who might not be able to participate.  Time involved in performance of separately billable procedures was not counted toward my critical care time. There is no overlap.  I spent 55-75 minutes ( 0800Hrs-0915Hrs in AM/ 1600Hrs-1715Hrs in PM, or other time indicated) of critical care time for the diagnoses, assessment, plan and interventions.  This time excludes time spent on separate procedures and teaching.  ADDENDUM:   Patient s/p total laryngectomy, ND and total thyroidectomy, with LRFF and STSG for reconstruction done on 09/05 due to Larynx ca. Admitted to SICU for flap check. No post-operative complication noted, no respiratory derangement noted but remained on full mechanical ventilation for failure to wean.  Patient disconnected from MV within 48hrs post op and not earlier for failure to wean..  After liberation from mechanical ventilation patient remained with respiratory abnormality secondary to tracheostomy.  .

## 2019-09-06 NOTE — PROGRESS NOTE ADULT - SUBJECTIVE AND OBJECTIVE BOX
Plastic Surgery Note:    Subjective:    Patient is now POD1 from a total laryngectomy, b/l neck dissection, total thyroidectomy, with LRFF and STSG for reconstruction. Patient doing well in SICU this AM. Comfortable without c/o.     Objective:    ICU Vital Signs Last 24 Hrs  T(C): 36.1 (06 Sep 2019 04:00), Max: 36.6 (05 Sep 2019 21:00)  T(F): 97 (06 Sep 2019 04:00), Max: 97.9 (05 Sep 2019 21:00)  HR: 72 (06 Sep 2019 07:32) (71 - 88)  BP: --  BP(mean): --  ABP: 113/52 (06 Sep 2019 06:00) (113/47 - 134/55)  ABP(mean): 72 (06 Sep 2019 06:00) (68 - 81)  RR: 18 (06 Sep 2019 06:00) (12 - 21)  SpO2: 97% (06 Sep 2019 07:32) (97% - 100%)    I&O's Detail    05 Sep 2019 07:01  -  06 Sep 2019 07:00  --------------------------------------------------------  IN:    Enteral Tube Flush: 100 mL    lactated ringers.: 800 mL    propofol Infusion: 64 mL  Total IN: 964 mL    OUT:    Bulb: 30 mL    Bulb: 5 mL    Bulb: 20 mL    Indwelling Catheter - Urethral: 480 mL  Total OUT: 535 mL    Total NET: 429 mL      Physical Exam:    GEN: well appearing, NAD  NECK: cook doppler with good signal, flap soft, non-congested in appearance without excessive swelling.    LABS:    09-06    140  |  101  |  17  ----------------------------<  182<H>  3.5   |  28  |  1.03    Ca    7.8<L>      06 Sep 2019 00:30  Phos  2.9     09-06  Mg     1.6     09-06    TPro  5.5<L>  /  Alb  3.2<L>  /  TBili  0.4  /  DBili  x   /  AST  14  /  ALT  6   /  AlkPhos  49  09-06                          10.5   16.05 )-----------( 225      ( 06 Sep 2019 00:30 )             32.8

## 2019-09-06 NOTE — PROGRESS NOTE ADULT - SUBJECTIVE AND OBJECTIVE BOX
POST ANESTHESIA EVALUATION    74y Male POSTOP DAY 1 S/P     MENTAL STATUS: Patient participation [  ] Awake     [  x] Arousable     [  ] Sedated    AIRWAY PATENCY: [  ] Satisfactory  [  x] Other: trach    Vital Signs Last 24 Hrs  T(C): 36.1 (06 Sep 2019 08:00), Max: 36.6 (05 Sep 2019 21:00)  T(F): 97 (06 Sep 2019 08:00), Max: 97.9 (05 Sep 2019 21:00)  HR: 68 (06 Sep 2019 09:00) (68 - 88)  BP: --  BP(mean): --  RR: 17 (06 Sep 2019 09:00) (12 - 21)  SpO2: 100% (06 Sep 2019 09:00) (97% - 100%)  I&O's Summary    05 Sep 2019 07:01  -  06 Sep 2019 07:00  --------------------------------------------------------  IN: 989 mL / OUT: 535 mL / NET: 454 mL    06 Sep 2019 07:01  -  06 Sep 2019 09:45  --------------------------------------------------------  IN: 50 mL / OUT: 190 mL / NET: -140 mL          NAUSEA/ VOMITTING:  [x  ] NONE  [  ] CONTROLLED [  ] OTHER     PAIN: [  x] CONTROLLED WITH CURRENT REGIMEN  [  ] OTHER    [x  ] NO APPARENT ANESTHESIA COMPLICATIONS      Comments:

## 2019-09-07 DIAGNOSIS — E89.2 POSTPROCEDURAL HYPOPARATHYROIDISM: ICD-10-CM

## 2019-09-07 DIAGNOSIS — E78.5 HYPERLIPIDEMIA, UNSPECIFIED: ICD-10-CM

## 2019-09-07 DIAGNOSIS — I10 ESSENTIAL (PRIMARY) HYPERTENSION: ICD-10-CM

## 2019-09-07 DIAGNOSIS — E89.0 POSTPROCEDURAL HYPOTHYROIDISM: ICD-10-CM

## 2019-09-07 LAB
ALBUMIN SERPL ELPH-MCNC: 2.9 G/DL — LOW (ref 3.3–5)
ALBUMIN SERPL ELPH-MCNC: 3 G/DL — LOW (ref 3.3–5)
ALBUMIN SERPL ELPH-MCNC: 3.2 G/DL — LOW (ref 3.3–5)
ALP SERPL-CCNC: 50 U/L — SIGNIFICANT CHANGE UP (ref 40–120)
ALP SERPL-CCNC: 51 U/L — SIGNIFICANT CHANGE UP (ref 40–120)
ALP SERPL-CCNC: 54 U/L — SIGNIFICANT CHANGE UP (ref 40–120)
ALT FLD-CCNC: 7 U/L — SIGNIFICANT CHANGE UP (ref 4–41)
ALT FLD-CCNC: 8 U/L — SIGNIFICANT CHANGE UP (ref 4–41)
ALT FLD-CCNC: 9 U/L — SIGNIFICANT CHANGE UP (ref 4–41)
ANION GAP SERPL CALC-SCNC: 10 MMO/L — SIGNIFICANT CHANGE UP (ref 7–14)
ANION GAP SERPL CALC-SCNC: 7 MMO/L — SIGNIFICANT CHANGE UP (ref 7–14)
ANION GAP SERPL CALC-SCNC: 9 MMO/L — SIGNIFICANT CHANGE UP (ref 7–14)
AST SERPL-CCNC: 14 U/L — SIGNIFICANT CHANGE UP (ref 4–40)
AST SERPL-CCNC: 15 U/L — SIGNIFICANT CHANGE UP (ref 4–40)
AST SERPL-CCNC: 18 U/L — SIGNIFICANT CHANGE UP (ref 4–40)
BILIRUB SERPL-MCNC: 0.3 MG/DL — SIGNIFICANT CHANGE UP (ref 0.2–1.2)
BUN SERPL-MCNC: 16 MG/DL — SIGNIFICANT CHANGE UP (ref 7–23)
BUN SERPL-MCNC: 17 MG/DL — SIGNIFICANT CHANGE UP (ref 7–23)
BUN SERPL-MCNC: 18 MG/DL — SIGNIFICANT CHANGE UP (ref 7–23)
CA-I BLDA-SCNC: 1.08 MMOL/L — LOW (ref 1.15–1.29)
CALCIUM SERPL-MCNC: 7.9 MG/DL — LOW (ref 8.4–10.5)
CALCIUM SERPL-MCNC: 7.9 MG/DL — LOW (ref 8.4–10.5)
CALCIUM SERPL-MCNC: 8 MG/DL — LOW (ref 8.4–10.5)
CHLORIDE SERPL-SCNC: 101 MMOL/L — SIGNIFICANT CHANGE UP (ref 98–107)
CHLORIDE SERPL-SCNC: 99 MMOL/L — SIGNIFICANT CHANGE UP (ref 98–107)
CHLORIDE SERPL-SCNC: 99 MMOL/L — SIGNIFICANT CHANGE UP (ref 98–107)
CO2 SERPL-SCNC: 30 MMOL/L — SIGNIFICANT CHANGE UP (ref 22–31)
CO2 SERPL-SCNC: 32 MMOL/L — HIGH (ref 22–31)
CO2 SERPL-SCNC: 33 MMOL/L — HIGH (ref 22–31)
CREAT SERPL-MCNC: 0.93 MG/DL — SIGNIFICANT CHANGE UP (ref 0.5–1.3)
CREAT SERPL-MCNC: 0.97 MG/DL — SIGNIFICANT CHANGE UP (ref 0.5–1.3)
CREAT SERPL-MCNC: 0.99 MG/DL — SIGNIFICANT CHANGE UP (ref 0.5–1.3)
GLUCOSE SERPL-MCNC: 122 MG/DL — HIGH (ref 70–99)
GLUCOSE SERPL-MCNC: 150 MG/DL — HIGH (ref 70–99)
GLUCOSE SERPL-MCNC: 168 MG/DL — HIGH (ref 70–99)
HCT VFR BLD CALC: 30.3 % — LOW (ref 39–50)
HGB BLD-MCNC: 9.9 G/DL — LOW (ref 13–17)
MAGNESIUM SERPL-MCNC: 1.8 MG/DL — SIGNIFICANT CHANGE UP (ref 1.6–2.6)
MAGNESIUM SERPL-MCNC: 1.9 MG/DL — SIGNIFICANT CHANGE UP (ref 1.6–2.6)
MAGNESIUM SERPL-MCNC: 1.9 MG/DL — SIGNIFICANT CHANGE UP (ref 1.6–2.6)
MCHC RBC-ENTMCNC: 29.2 PG — SIGNIFICANT CHANGE UP (ref 27–34)
MCHC RBC-ENTMCNC: 32.7 % — SIGNIFICANT CHANGE UP (ref 32–36)
MCV RBC AUTO: 89.4 FL — SIGNIFICANT CHANGE UP (ref 80–100)
NRBC # FLD: 0 K/UL — SIGNIFICANT CHANGE UP (ref 0–0)
PHOSPHATE SERPL-MCNC: 2.7 MG/DL — SIGNIFICANT CHANGE UP (ref 2.5–4.5)
PHOSPHATE SERPL-MCNC: 3.6 MG/DL — SIGNIFICANT CHANGE UP (ref 2.5–4.5)
PHOSPHATE SERPL-MCNC: 3.6 MG/DL — SIGNIFICANT CHANGE UP (ref 2.5–4.5)
PLATELET # BLD AUTO: 199 K/UL — SIGNIFICANT CHANGE UP (ref 150–400)
PMV BLD: 10.2 FL — SIGNIFICANT CHANGE UP (ref 7–13)
POTASSIUM SERPL-MCNC: 3.7 MMOL/L — SIGNIFICANT CHANGE UP (ref 3.5–5.3)
POTASSIUM SERPL-MCNC: 3.8 MMOL/L — SIGNIFICANT CHANGE UP (ref 3.5–5.3)
POTASSIUM SERPL-MCNC: 3.9 MMOL/L — SIGNIFICANT CHANGE UP (ref 3.5–5.3)
POTASSIUM SERPL-SCNC: 3.7 MMOL/L — SIGNIFICANT CHANGE UP (ref 3.5–5.3)
POTASSIUM SERPL-SCNC: 3.8 MMOL/L — SIGNIFICANT CHANGE UP (ref 3.5–5.3)
POTASSIUM SERPL-SCNC: 3.9 MMOL/L — SIGNIFICANT CHANGE UP (ref 3.5–5.3)
PROT SERPL-MCNC: 5.5 G/DL — LOW (ref 6–8.3)
PROT SERPL-MCNC: 5.9 G/DL — LOW (ref 6–8.3)
PROT SERPL-MCNC: 6 G/DL — SIGNIFICANT CHANGE UP (ref 6–8.3)
RBC # BLD: 3.39 M/UL — LOW (ref 4.2–5.8)
RBC # FLD: 13.2 % — SIGNIFICANT CHANGE UP (ref 10.3–14.5)
SODIUM SERPL-SCNC: 139 MMOL/L — SIGNIFICANT CHANGE UP (ref 135–145)
SODIUM SERPL-SCNC: 140 MMOL/L — SIGNIFICANT CHANGE UP (ref 135–145)
SODIUM SERPL-SCNC: 141 MMOL/L — SIGNIFICANT CHANGE UP (ref 135–145)
WBC # BLD: 10.3 K/UL — SIGNIFICANT CHANGE UP (ref 3.8–10.5)
WBC # FLD AUTO: 10.3 K/UL — SIGNIFICANT CHANGE UP (ref 3.8–10.5)

## 2019-09-07 PROCEDURE — 99233 SBSQ HOSP IP/OBS HIGH 50: CPT

## 2019-09-07 PROCEDURE — 99222 1ST HOSP IP/OBS MODERATE 55: CPT | Mod: GC

## 2019-09-07 RX ORDER — LEVOTHYROXINE SODIUM 125 MCG
137 TABLET ORAL DAILY
Refills: 0 | Status: DISCONTINUED | OUTPATIENT
Start: 2019-09-07 | End: 2019-09-17

## 2019-09-07 RX ORDER — SENNA PLUS 8.6 MG/1
10 TABLET ORAL ONCE
Refills: 0 | Status: DISCONTINUED | OUTPATIENT
Start: 2019-09-07 | End: 2019-09-08

## 2019-09-07 RX ORDER — DOCUSATE SODIUM 100 MG
100 CAPSULE ORAL DAILY
Refills: 0 | Status: DISCONTINUED | OUTPATIENT
Start: 2019-09-07 | End: 2019-09-17

## 2019-09-07 RX ORDER — MAGNESIUM SULFATE 500 MG/ML
2 VIAL (ML) INJECTION ONCE
Refills: 0 | Status: COMPLETED | OUTPATIENT
Start: 2019-09-07 | End: 2019-09-07

## 2019-09-07 RX ADMIN — CALCITRIOL 0.5 MICROGRAM(S): 0.5 CAPSULE ORAL at 05:04

## 2019-09-07 RX ADMIN — CALCITRIOL 0.5 MICROGRAM(S): 0.5 CAPSULE ORAL at 17:53

## 2019-09-07 RX ADMIN — CHLORHEXIDINE GLUCONATE 1 APPLICATION(S): 213 SOLUTION TOPICAL at 15:16

## 2019-09-07 RX ADMIN — Medication 2500 MILLIGRAM(S): at 21:43

## 2019-09-07 RX ADMIN — Medication 2500 MILLIGRAM(S): at 05:03

## 2019-09-07 RX ADMIN — ENOXAPARIN SODIUM 40 MILLIGRAM(S): 100 INJECTION SUBCUTANEOUS at 11:44

## 2019-09-07 RX ADMIN — Medication 100 MILLIGRAM(S): at 14:58

## 2019-09-07 RX ADMIN — Medication 100 MILLIGRAM(S): at 21:45

## 2019-09-07 RX ADMIN — Medication 25 MILLIGRAM(S): at 05:03

## 2019-09-07 RX ADMIN — Medication 125 MICROGRAM(S): at 05:03

## 2019-09-07 RX ADMIN — Medication 81 MILLIGRAM(S): at 11:44

## 2019-09-07 RX ADMIN — Medication 50 GRAM(S): at 05:03

## 2019-09-07 RX ADMIN — Medication 100 MILLIGRAM(S): at 05:04

## 2019-09-07 RX ADMIN — Medication 2500 MILLIGRAM(S): at 15:03

## 2019-09-07 RX ADMIN — AMLODIPINE BESYLATE 10 MILLIGRAM(S): 2.5 TABLET ORAL at 05:03

## 2019-09-07 RX ADMIN — SIMVASTATIN 20 MILLIGRAM(S): 20 TABLET, FILM COATED ORAL at 21:47

## 2019-09-07 RX ADMIN — CHLORHEXIDINE GLUCONATE 15 MILLILITER(S): 213 SOLUTION TOPICAL at 05:04

## 2019-09-07 NOTE — CONSULT NOTE ADULT - PROBLEM SELECTOR RECOMMENDATION 2
- POD # 2 s/p total thyroidectomy along with laryngectomy and partial parathyroidectomy for laryngeal cancer.   - Started on synthroid post op  - Patient is clinically euthyroid.   - Would recommend to increase dose of LT4 to 137 mcg (weight based would be 144). Discussed with team.   - Check TFTs and follow up as outpatient at [address] [place] [address].

## 2019-09-07 NOTE — CONSULT NOTE ADULT - PROBLEM SELECTOR RECOMMENDATION 9
- POD # 2 s/p partial parathyroidectomy and total thyroidectomy along with laryngectomy for laryngeal cancer, now with post op hypoparathyroidism.   - Intact PTH level 2.73 with Ca of 7.9 , corrected for albumin would be 8.5  - Patient does not   - Continue with calcium supplementation with oral calcium carbonate suspension 2500 mg TID and Calcitriol solution 0.5 mg BID via NG tube  - Monitor serum calcium q12h and give IV calcium prn   - Patient can follow with [facility] :  [facility]  [address] [place] [address], [address], [place] [address]  [phone]  Please call to schedule appointment with MD appointment next available.  Plan discussed with team. - POD # 2 s/p partial parathyroidectomy and total thyroidectomy along with laryngectomy for laryngeal cancer, now with post op hypoparathyroidism.   - Intact PTH level 2.73 with Ca of 7.9 , corrected for albumin would be 8.5  - Continue with calcium supplementation with oral calcium carbonate suspension 2500 mg TID and Calcitriol solution 0.5 mg BID via NG tube  - Monitor serum calcium q12h and give IV calcium prn   - Patient can follow with [facility] :  [facility]  [address] [place] [address], [address], [place], [place] [id number] [phone]  Please call to schedule appointment with MD appointment next available.  Plan discussed with team.

## 2019-09-07 NOTE — CONSULT NOTE ADULT - ASSESSMENT
74 year old male with known T4 larynx cancer with extralaryngeal spread, HTN, HLD who presents to the ED with shortness of breath one day prior to his scheduled surgery for laryngeal cancer. Patient underwent laryngectomy, neck dissection, tracheostomy, reconstruction of anterior pharynx with radial forearm flap from left arm, skin graft from left thigh to forearm donor site, thyroidectomy with reimplantation and partial parathyroidectomy on 9/5/19. Endocrine team consulted for post op hypothyroid and hypoparathyroid. 74 year old male with known T4 larynx cancer with extralaryngeal spread, HTN, HLD who presents to the ED with shortness of breath one day prior to his scheduled surgery for laryngeal cancer. Patient underwent laryngectomy, neck dissection, tracheostomy, reconstruction of anterior pharynx with radial forearm flap from left arm, skin graft from left thigh to forearm donor site, thyroidectomy with reimplantation and partial parathyroidectomy on 9/5/19. Endocrine team consulted for post op hypothyroidism and hypoparathyroidism.

## 2019-09-07 NOTE — PROGRESS NOTE ADULT - SUBJECTIVE AND OBJECTIVE BOX
Patient seen and examined at the bedside. No acute events overnight. On trach collar, cuff down.    Vital Signs Last 24 Hrs  T(C): 37 (07 Sep 2019 08:00), Max: 37.3 (07 Sep 2019 04:00)  T(F): 98.6 (07 Sep 2019 08:00), Max: 99.1 (07 Sep 2019 04:00)  HR: 81 (07 Sep 2019 10:00) (73 - 95)  BP: --  BP(mean): --  RR: 19 (07 Sep 2019 10:00) (13 - 24)  SpO2: 98% (07 Sep 2019 10:00) (93% - 100%)    NAD, awake and alert  Breathing comfortably on humidified TC   OC/OP: wnl  Neck: soft, flat, bilateral IDA with SS drainage. Incision c/d/i   6LPC with sutures, cuff deflated   Yoozon doppler signal strong  L arm with ACE wrap, wound vac in place and IDA with scant drainage     A/P: 74M s/p TL, TT, bilateral SND, L RFFF, L STSG 9/4     -Will change trach to cuffless today  -IS/CPT  -start continuous tube feeds  -IDA to bulb suction  -AutoShag doppler  -ok to dc xuan and fong  -floor when ok from plastics  -OOBTC   -PT/OT  -PM&R   -asa/lov  -clinda  -synthroid  -calcium repletions IV PRN  -max oral repletions  -q6h Ca checks  -endocrinology consult for co-managment of hypothyroid and hypoparathyroid post op

## 2019-09-07 NOTE — CONSULT NOTE ADULT - SUBJECTIVE AND OBJECTIVE BOX
HPI:  Patient is a 74 year old male with known T4 larynx cancer with extralaryngeal spread, HTN, HLD who presents to the ED with shortness of breath one day prior to his scheduled surgery for laryngeal cancer. He reports that he was sleeping this morning when he woke up around 5am with acute shortness of breath. His respiratory status is worse than last week but currently denies difficulty breathing when sitting in bed. He currently says he "feels ok". His son reports that he does become short of breath when he walks around. Reports he felt like some mucus was stuck in his throat and he had difficulty coughing it up. He reports that he otherwise has been eating food and drinking liquids ok. Denies pain. (04 Sep 2019 11:37)    Patient underwent laryngectomy, neck dissection, tracheostomy, reconstruction of anterior pharynx with radial forearm flap from left arm, skin graft from left thigh to forearm donor site, thyroidectomy with reimplantation and partial parathyroidectomy on 9/5/19.     Endocrine history:        PAST MEDICAL & SURGICAL HISTORY:  Laryngeal cancer  Hyperlipidemia, mild  HTN (hypertension)  Infectious hepatitis: 1958  No significant past surgical history      FAMILY HISTORY:  Family history of gynecological problem: cancer-sister  FH: colon cancer: brother  FH: lung cancer: father      Social History:      Outpatient Medications:  · 	amLODIPine 10 mg oral tablet: 1 tab(s) orally once a day  · 	simvastatin 20 mg oral tablet: 1 tab(s) orally once a day (at bedtime)  · 	hydroCHLOROthiazide 25 mg oral tablet: 1 tab(s) orally once a day  · 	aspirin 81 mg oral tablet: 1 tab(s) orally once a day      MEDICATIONS  (STANDING):  amLODIPine   Tablet 10 milliGRAM(s) Oral daily  aspirin  chewable 81 milliGRAM(s) Enteral Tube daily  calcitriol  Solution 0.5 MICROGram(s) Oral <User Schedule>  calcium carbonate   Suspension 2500 milliGRAM(s) Oral three times a day  chlorhexidine 4% Liquid 1 Application(s) Topical daily  clindamycin IVPB      clindamycin IVPB 600 milliGRAM(s) IV Intermittent every 8 hours  enoxaparin Injectable 40 milliGRAM(s) SubCutaneous daily  hydrochlorothiazide 25 milliGRAM(s) Oral daily  levothyroxine 125 MICROGram(s) Oral daily  simvastatin 20 milliGRAM(s) Oral at bedtime    MEDICATIONS  (PRN):  acetaminophen    Suspension .. 650 milliGRAM(s) Oral every 6 hours PRN Mild Pain (1 - 3)  oxyCODONE    IR 5 milliGRAM(s) Oral every 3 hours PRN Moderate Pain (4 - 6)  oxyCODONE    IR 10 milliGRAM(s) Oral every 3 hours PRN Severe Pain (7 - 10)      Allergies    penicillin (Rash)    Intolerances      Review of Systems:  Constitutional: No fever, good appetite/po intake  Eyes: No blurry vision, diplopia  Neuro: No tremors  HEENT: No pain  Cardiovascular: No chest pain, palpitations  Respiratory: No SOB, no cough  GI: No nausea, vomiting,   : No dysuria, hematuria  Skin: no rash  Psych: no depression  Endocrine: no polyuria, polydipsia  Hem/lymph: no swelling  Osteoporosis: no fractures    ALL OTHER SYSTEMS REVIEWED AND NEGATIVE    UNABLE TO OBTAIN    PHYSICAL EXAM:  VITALS: T(C): 36.8 (09-07-19 @ 12:00)  T(F): 98.3 (09-07-19 @ 12:00), Max: 99.1 (09-07-19 @ 04:00)  HR: 84 (09-07-19 @ 12:00) (78 - 95)  BP: --  RR:  (14 - 24)  SpO2:  (94% - 100%)  Wt(kg): --  GENERAL: NAD, well-groomed, well-developed  EYES: No proptosis, no lid lag, anicteric  HEENT:  Atraumatic, Normocephalic, moist mucous membranes  THYROID: Normal size, no palpable nodules  RESPIRATORY: Clear to auscultation bilaterally; No rales, rhonchi, wheezing, or rubs  CARDIOVASCULAR: Regular rate and rhythm; No murmurs; no peripheral edema  GI: Soft, nontender, non distended, normal bowel sounds  SKIN: Dry, intact, No rashes or lesions  NEURO: sensation intact, extraocular movements intact, no tremor, normal reflexes  PSYCH: reactive affect, euthymic mood  CUSHING'S SIGNS: no striae                              9.9    10.30 )-----------( 199      ( 07 Sep 2019 00:09 )             30.3       09-07    141  |  101  |  18  ----------------------------<  168<H>  3.7   |  30  |  0.99    EGFR if : 87  EGFR if non : 75    Ca    8.0<L>      09-07  Mg     1.8     09-07  Phos  2.7     09-07    TPro  5.5<L>  /  Alb  3.0<L>  /  TBili  0.3  /  DBili  x   /  AST  18  /  ALT  7   /  AlkPhos  50  09-07      Thyroid Function Tests: HPI:  Patient is a 74 year old male with known T4 larynx cancer with extralaryngeal spread, HTN, HLD who presents to the ED with shortness of breath one day prior to his scheduled surgery for laryngeal cancer. He reports that he was sleeping this morning when he woke up around 5am with acute shortness of breath. His respiratory status is worse than last week but currently denies difficulty breathing when sitting in bed. He currently says he "feels ok". His son reports that he does become short of breath when he walks around. Reports he felt like some mucus was stuck in his throat and he had difficulty coughing it up. He reports that he otherwise has been eating food and drinking liquids ok. Denies pain. (04 Sep 2019 11:37)    Patient underwent laryngectomy, neck dissection, tracheostomy, reconstruction of anterior pharynx with radial forearm flap from left arm, skin graft from left thigh to forearm donor site, thyroidectomy with reimplantation and partial parathyroidectomy on 9/5/19.     Endocrine history:        PAST MEDICAL & SURGICAL HISTORY:  Laryngeal cancer  Hyperlipidemia, mild  HTN (hypertension)  Infectious hepatitis: 1958  No significant past surgical history      FAMILY HISTORY:  Family history of gynecological problem: cancer-sister  FH: colon cancer: brother  FH: lung cancer: father      Social History:  No smoking, alcohol or illicit drug use reported.       Outpatient Medications:  · 	amLODIPine 10 mg oral tablet: 1 tab(s) orally once a day  · 	simvastatin 20 mg oral tablet: 1 tab(s) orally once a day (at bedtime)  · 	hydroCHLOROthiazide 25 mg oral tablet: 1 tab(s) orally once a day  · 	aspirin 81 mg oral tablet: 1 tab(s) orally once a day      MEDICATIONS  (STANDING):  amLODIPine   Tablet 10 milliGRAM(s) Oral daily  aspirin  chewable 81 milliGRAM(s) Enteral Tube daily  calcitriol  Solution 0.5 MICROGram(s) Oral <User Schedule>  calcium carbonate   Suspension 2500 milliGRAM(s) Oral three times a day  chlorhexidine 4% Liquid 1 Application(s) Topical daily  clindamycin IVPB      clindamycin IVPB 600 milliGRAM(s) IV Intermittent every 8 hours  enoxaparin Injectable 40 milliGRAM(s) SubCutaneous daily  hydrochlorothiazide 25 milliGRAM(s) Oral daily  levothyroxine 125 MICROGram(s) Oral daily  simvastatin 20 milliGRAM(s) Oral at bedtime    MEDICATIONS  (PRN):  acetaminophen    Suspension .. 650 milliGRAM(s) Oral every 6 hours PRN Mild Pain (1 - 3)  oxyCODONE    IR 5 milliGRAM(s) Oral every 3 hours PRN Moderate Pain (4 - 6)  oxyCODONE    IR 10 milliGRAM(s) Oral every 3 hours PRN Severe Pain (7 - 10)      Allergies  penicillin (Rash)      Review of Systems:  Constitutional: No fever  Eyes: No blurry vision, diplopia  Neuro: No tremors  HEENT: + soreness in throat, no pain  Cardiovascular: No chest pain, palpitations  Respiratory: No SOB, no cough  GI: No nausea, vomiting or abdominal pain, passing gas  : No dysuria, hematuria  Skin: no rash  Psych: no depression  Endocrine: no polyuria, polydipsia  Hem/lymph: no swelling  Osteoporosis: no fractures    ALL OTHER SYSTEMS REVIEWED AND NEGATIVE      PHYSICAL EXAM:  VITALS: T(C): 36.8 (09-07-19 @ 12:00)  T(F): 98.3 (09-07-19 @ 12:00), Max: 99.1 (09-07-19 @ 04:00)  HR: 84 (09-07-19 @ 12:00) (78 - 95)  BP: --  RR:  (14 - 24)  SpO2:  (94% - 100%)  Wt(kg): --    GENERAL: NAD, well-groomed, well-developed, sitting up in chair  EYES: No proptosis, anicteric  HEENT: stiches intact, trach collar in place.   THYROID: s/p total thyroidectomy   RESPIRATORY: Clear to auscultation bilaterally; No rales, rhonchi, wheezing, or rubs  CARDIOVASCULAR: Regular rate and rhythm; No murmurs; no peripheral edema  GI: Soft, nontender, non distended, normal bowel sounds  NEURO: AAO x 3, communicating with writing as unable to vocalize due to recent surgery  PSYCH: reactive affect, euthymic mood                                9.9    10.30 )-----------( 199      ( 07 Sep 2019 00:09 )             30.3       09-07    141  |  101  |  18  ----------------------------<  168<H>  3.7   |  30  |  0.99    EGFR if : 87  EGFR if non : 75    Ca    8.0<L>      09-07  Mg     1.8     09-07  Phos  2.7     09-07    TPro  5.5<L>  /  Alb  3.0<L>  /  TBili  0.3  /  DBili  x   /  AST  18  /  ALT  7   /  AlkPhos  50  09-07 HPI:  Patient is a 74 year old male with known T4 larynx cancer with extralaryngeal spread, HTN, HLD who presents to the ED with shortness of breath one day prior to his scheduled surgery for laryngeal cancer. He reports that he was sleeping this morning when he woke up around 5am with acute shortness of breath. His respiratory status is worse than last week but currently denies difficulty breathing when sitting in bed. He currently says he "feels ok". His son reports that he does become short of breath when he walks around. Reports he felt like some mucus was stuck in his throat and he had difficulty coughing it up. He reports that he otherwise has been eating food and drinking liquids ok. Denies pain. (04 Sep 2019 11:37)    Patient underwent laryngectomy, neck dissection, tracheostomy, reconstruction of anterior pharynx with radial forearm flap from left arm, skin graft from left thigh to forearm donor site, thyroidectomy with reimplantation and partial parathyroidectomy on 9/5/19.     Endocrine history:  Patient has no prior persona; or family history of thyroid or parathyroid disorders. Now s/p laryngectomy, total thyroidectomy and partial parathyroidectomy patient has developed post op hypothyroidism and hypoparathyroidism. Intact PTH level after surgery noted to be 2.73 with Ca of 7.8, corrected would be 8.4. Patient was started on LT4 125 mcg and Calcium supplementation with oral calcium carbonate suspension 2500 mg TID and Calcitriol solution 0.5 mg BID via NG tube. Patient is currently feeling ok, unable to vocalize much due to recent surgery. Told by writing that he does not have any numbness or tingling in extremities or perioral numbness. No hypothyroid or hyperthyroid symptoms reported.       PAST MEDICAL & SURGICAL HISTORY:  Laryngeal cancer  Hyperlipidemia, mild  HTN (hypertension)  Infectious hepatitis: 1958  No significant past surgical history      FAMILY HISTORY:  Family history of gynecological problem: cancer-sister  FH: colon cancer: brother  FH: lung cancer: father      Social History:  No smoking, alcohol or illicit drug use reported.       Outpatient Medications:  · 	amLODIPine 10 mg oral tablet: 1 tab(s) orally once a day  · 	simvastatin 20 mg oral tablet: 1 tab(s) orally once a day (at bedtime)  · 	hydroCHLOROthiazide 25 mg oral tablet: 1 tab(s) orally once a day  · 	aspirin 81 mg oral tablet: 1 tab(s) orally once a day      MEDICATIONS  (STANDING):  amLODIPine   Tablet 10 milliGRAM(s) Oral daily  aspirin  chewable 81 milliGRAM(s) Enteral Tube daily  calcitriol  Solution 0.5 MICROGram(s) Oral <User Schedule>  calcium carbonate   Suspension 2500 milliGRAM(s) Oral three times a day  chlorhexidine 4% Liquid 1 Application(s) Topical daily  clindamycin IVPB      clindamycin IVPB 600 milliGRAM(s) IV Intermittent every 8 hours  enoxaparin Injectable 40 milliGRAM(s) SubCutaneous daily  hydrochlorothiazide 25 milliGRAM(s) Oral daily  levothyroxine 125 MICROGram(s) Oral daily  simvastatin 20 milliGRAM(s) Oral at bedtime    MEDICATIONS  (PRN):  acetaminophen    Suspension .. 650 milliGRAM(s) Oral every 6 hours PRN Mild Pain (1 - 3)  oxyCODONE    IR 5 milliGRAM(s) Oral every 3 hours PRN Moderate Pain (4 - 6)  oxyCODONE    IR 10 milliGRAM(s) Oral every 3 hours PRN Severe Pain (7 - 10)      Allergies  penicillin (Rash)      Review of Systems:  Constitutional: No fever  Eyes: No blurry vision, diplopia  Neuro: No tremors  HEENT: + soreness in throat, no pain  Cardiovascular: No chest pain, palpitations  Respiratory: No SOB, no cough  GI: No nausea, vomiting or abdominal pain, passing gas  : No dysuria, hematuria  Skin: no rash  Psych: no depression  Endocrine: no polyuria, polydipsia  Hem/lymph: no swelling  Osteoporosis: no fractures    ALL OTHER SYSTEMS REVIEWED AND NEGATIVE      PHYSICAL EXAM:  VITALS: T(C): 36.8 (09-07-19 @ 12:00)  T(F): 98.3 (09-07-19 @ 12:00), Max: 99.1 (09-07-19 @ 04:00)  HR: 84 (09-07-19 @ 12:00) (78 - 95)  BP: --  RR:  (14 - 24)  SpO2:  (94% - 100%)  Wt(kg): --    GENERAL: NAD, well-groomed, well-developed, sitting up in chair  EYES: No proptosis, anicteric  HEENT: stiches intact, trach collar in place.   THYROID: s/p total thyroidectomy   RESPIRATORY: Clear to auscultation bilaterally; No rales, rhonchi, wheezing, or rubs  CARDIOVASCULAR: Regular rate and rhythm; No murmurs; no peripheral edema  GI: Soft, nontender, non distended, normal bowel sounds  NEURO: AAO x 3, communicating with writing as unable to vocalize due to recent surgery  PSYCH: reactive affect, euthymic mood                                9.9    10.30 )-----------( 199      ( 07 Sep 2019 00:09 )             30.3       09-07    141  |  101  |  18  ----------------------------<  168<H>  3.7   |  30  |  0.99    EGFR if : 87  EGFR if non : 75    Ca    8.0<L>      09-07  Mg     1.8     09-07  Phos  2.7     09-07    TPro  5.5<L>  /  Alb  3.0<L>  /  TBili  0.3  /  DBili  x   /  AST  18  /  ALT  7   /  AlkPhos  50  09-07

## 2019-09-07 NOTE — PROGRESS NOTE ADULT - ATTENDING COMMENTS
74 year old M with laryngeal cancer thought to be secondary to chronic GERD with extralaryngeal spread, HTN, HLD s/p laryngectomy, neck dissection, tracheostomy, reconstruction of anterior pharynx with radial forearm flap from left arm, skin graft from left thigh to forearm donor site, thyroidectomy with reimplantation with ENT and plastic surgery admitted to SICU for new tracheostomy and Q1H flap checks. Now Q2H flap checks will continue to monitor and downgrade    PLAN:   Neurologic:   - off sedation, no acute issues  - Oxycodone PRN for moderate to severe pain via PEG tube, IV Dilaudid overnight PRN    Respiratory:  - S/p laryngectomy, tracheostomy in place   - trach collar  - Pressure support/ SBT in AM  - Q2H pharyngeal flap checks    Cardiovascular:   - BP monitoring, c/w home HTN medications Amlodipine and Losartan    Gastrointestinal/Nutrition:   - NPO, NGT placement confirmed w/ CXR   -start tube feeds    Renal/Genitourinary:   - No issues  - Trend BMP  - Moore removed    Hematologic:   -  mg rectal given post op  - 81 mg daily for flap perfusion  - Lovenox for DVT prophylaxis    Infectious Disease:   - Penicillin allergic, continue with Clindamycin    Lines/Tubes:  PIV x 2, R radial arterial line    Endocrine:   - S/p total thyroidectomy- continue with PO Synthroid  - PTH low, as expected due to stunning, continue to replete calcium   - Q8H BMP, ionized calcium  - Calcium supplementation- Calcitriol 0.5 mcg BID, Calcium carbonate 2500 mg TID    Disposition: SICU    --------------------------------------------------------------------------------------  Critical Care Diagnoses: Laryngeal cancer, respiratory failure, parathyroid dysfunction/ hypocalcemia, respiratory abnormality.  The patient is a critical care patient with life threatening hemodynamic and metabolic instability in SICU.  I have personally interviewed when possible and examined the patient, reviewed data and laboratory tests/x-rays and all pertinent electronic images.  I was physically present for the key portions of the evaluation and management (E/M) service provided.   The SICU team has a constant risk benefit analyzes discussion with the primary team, all consultants, House Staff and PA's on all decisions.  These diagnoses are unrelated to the surgical procedure noted above.  I meet with family if needed to get further history, discuss the case and make care decisions for this patient who might not be able to participate.  Time involved in performance of separately billable procedures was not counted toward my critical care time. There is no overlap.  I spent 55-75 minutes ( 0800Hrs-0915Hrs in AM/ 1600Hrs-1715Hrs in PM, or other time indicated) of critical care time for the diagnoses, assessment, plan and interventions.  This time excludes time spent on separate procedures and teaching. 74 year old M with laryngeal cancer thought to be secondary to chronic GERD with extralaryngeal spread, HTN, HLD s/p laryngectomy, neck dissection, tracheostomy, reconstruction of anterior pharynx with radial forearm flap from left arm, skin graft from left thigh to forearm donor site, thyroidectomy with reimplantation with ENT and plastic surgery admitted to SICU for new tracheostomy and Q1H flap checks. Now Q2H flap checks will continue to monitor and downgrade    PLAN:   Neurologic:   - off sedation, no acute issues  - Oxycodone PRN for moderate to severe pain via PEG tube, IV Dilaudid overnight PRN    Respiratory:  - S/p laryngectomy, tracheostomy in place   - trach collar  - Pressure support/ SBT in AM  - Q2H pharyngeal flap checks    Cardiovascular:   - BP monitoring, c/w home HTN medications Amlodipine and Losartan    Gastrointestinal/Nutrition:   - NPO, NGT placement confirmed w/ CXR   -start tube feeds    Renal/Genitourinary:   - No issues  - Trend BMP  - Moore removed    Hematologic:   -  mg rectal given post op  - 81 mg daily for flap perfusion  - Lovenox for DVT prophylaxis    Infectious Disease:   - Penicillin allergic, continue with Clindamycin    Lines/Tubes:  PIV x 2, R radial arterial line    Endocrine:   - S/p total thyroidectomy- continue with PO Synthroid  - PTH low, as expected due to stunning, continue to replete calcium   - Q8H BMP, ionized calcium  - Calcium supplementation- Calcitriol 0.5 mcg BID, Calcium carbonate 2500 mg TID    Disposition: SICU    --------------------------------------------------------------------------------------  Critical Care Diagnoses: Laryngeal cancer, respiratory failure, parathyroid dysfunction/ hypocalcemia, respiratory abnormality.  The patient is a critical care patient with life threatening hemodynamic and metabolic instability in SICU.  I have personally interviewed when possible and examined the patient, reviewed data and laboratory tests/x-rays and all pertinent electronic images.  I was physically present for the key portions of the evaluation and management (E/M) service provided.   The SICU team has a constant risk benefit analyzes discussion with the primary team, all consultants, House Staff and PA's on all decisions.  These diagnoses are unrelated to the surgical procedure noted above.  I meet with family if needed to get further history, discuss the case and make care decisions for this patient who might not be able to participate.  Time involved in performance of separately billable procedures was not counted toward my critical care time. There is no overlap.  I spent 55-75 minutes ( 0800Hrs-0915Hrs in AM/ 1600Hrs-1715Hrs in PM, or other time indicated) of critical care time for the diagnoses, assessment, plan and interventions.  This time excludes time spent on separate procedures and teaching.  ADDENDUM; Patient admitted with larynx ca, s/p total laryngectomy, ND and total thyroidectomy.   Course of treatment complicated by post-operative acute blood loss anemia requiring additional SICU monitoring.     EBL during surgery = 300 ML  · Estimated Blood Loss 300 milliLiter(s)  · IV Infusions - Crystalloids 3000  · IV Infusions - Colloids 500  · IV Infusions - Blood Products 0  ? H/H on admission date = 14.1/43.7 down to 9.9/30.3 on 9/7

## 2019-09-07 NOTE — PROGRESS NOTE ADULT - SUBJECTIVE AND OBJECTIVE BOX
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 He underwent laryngectomy, neck dissection, tracheostomy, reconstruction of anterior pharynx with radial forearm flap from left arm, skin graft from left thigh to forearm donor site, thyroidectomy with reimplantation with ENT and plastic surgery. Case was routine. Admitted to SICU paralyzed, mechanically ventilated via new tracheostomy, to have Q1H flap checks.   Allergies: penicillin (Rash)      Allergies: penicillin (Rash)      MEDICATIONS:   --------------------------------------------------------------------------------------  Neurologic Medications  acetaminophen    Suspension .. 650 milliGRAM(s) Oral every 6 hours PRN Mild Pain (1 - 3)  oxyCODONE    IR 5 milliGRAM(s) Oral every 3 hours PRN Moderate Pain (4 - 6)  oxyCODONE    IR 10 milliGRAM(s) Oral every 3 hours PRN Severe Pain (7 - 10)    Respiratory Medications    Cardiovascular Medications  amLODIPine   Tablet 10 milliGRAM(s) Oral daily  hydrochlorothiazide 25 milliGRAM(s) Oral daily    Gastrointestinal Medications  calcitriol  Solution 0.5 MICROGram(s) Oral <User Schedule>  calcium carbonate   Suspension 2500 milliGRAM(s) Oral three times a day  magnesium sulfate  IVPB 2 Gram(s) IV Intermittent once    Genitourinary Medications    Hematologic/Oncologic Medications  aspirin  chewable 81 milliGRAM(s) Enteral Tube daily  enoxaparin Injectable 40 milliGRAM(s) SubCutaneous daily    Antimicrobial/Immunologic Medications  clindamycin IVPB      clindamycin IVPB 600 milliGRAM(s) IV Intermittent every 8 hours    Endocrine/Metabolic Medications  levothyroxine 125 MICROGram(s) Oral daily  simvastatin 20 milliGRAM(s) Oral at bedtime    Topical/Other Medications  chlorhexidine 0.12% Liquid 15 milliLiter(s) Oral Mucosa every 12 hours  chlorhexidine 4% Liquid 1 Application(s) Topical daily    --------------------------------------------------------------------------------------    VITAL SIGNS, INS/OUTS (last 24 hours):  --------------------------------------------------------------------------------------  ICU Vital Signs Last 24 Hrs  T(C): 36.7 (07 Sep 2019 00:00), Max: 36.8 (06 Sep 2019 20:00)  T(F): 98.1 (07 Sep 2019 00:00), Max: 98.3 (06 Sep 2019 20:00)  HR: 91 (07 Sep 2019 01:00) (68 - 95)  BP: --  BP(mean): --  ABP: 139/61 (07 Sep 2019 01:00) (103/49 - 139/61)  ABP(mean): 86 (07 Sep 2019 01:00) (68 - 101)  RR: 24 (07 Sep 2019 01:00) (13 - 24)  SpO2: 97% (07 Sep 2019 01:00) (93% - 100%)    I&O's Summary    05 Sep 2019 07:01  -  06 Sep 2019 07:00  --------------------------------------------------------  IN: 989 mL / OUT: 535 mL / NET: 454 mL    06 Sep 2019 07:01  -  07 Sep 2019 02:40  --------------------------------------------------------  IN: 1035 mL / OUT: 915 mL / NET: 120 mL      --------------------------------------------------------------------------------------  EXAM  NEUROLOGY  GCS: 15  Exam: on trach collar, awake, alert, responds to commands    HEENT  Exam: Normocephalic, atraumatic. Neck incision C/D/I. Right and Left neck JPs with sanguinous output.     RESPIRATORY  Exam: Lungs clear to auscultation, Normal expansion/effort. trach collar    CARDIOVASCULAR  Exam: S1, S2.  Regular rate and rhythm.     GI/NUTRITION  Exam: Abdomen soft, Non-tender, Non-distended.  NGT sutured in place.    Current Diet:  NPO    VASCULAR  Exam: Extremities warm, pink, well-perfused.     MUSCULOSKELETAL  Exam: L thigh donor site wrapped with ACE bandage, C/D/I. Right forearm wrapped in ACE and IDA drain with sanguinous output. All extremities moving spontaneously without limitations.     SKIN  Exam: Good skin turgor, no skin breakdown.     METABOLIC/FLUIDS/ELECTROLYTES  calcitriol  Solution 0.5 MICROGram(s) Oral <User Schedule>  calcium carbonate   Suspension 2500 milliGRAM(s) Oral three times a day  magnesium sulfate  IVPB 2 Gram(s) IV Intermittent once      HEMATOLOGIC  [x] VTE Prophylaxis: aspirin  chewable 81 milliGRAM(s) Enteral Tube daily  enoxaparin Injectable 40 milliGRAM(s) SubCutaneous daily    Transfusions:	[] PRBC	[] Platelets		[] FFP	[] Cryoprecipitate    INFECTIOUS DISEASE  Antimicrobials/Immunologic Medications:  clindamycin IVPB      clindamycin IVPB 600 milliGRAM(s) IV Intermittent every 8 hours    Day #      of         Tubes/Lines/Drains  [x] Peripheral IV  [] Central Venous Line     	[] R	[] L	[] IJ	[] Fem	[] SC	Date Placed:   [] Arterial Line		[] R	[] L	[] Fem	[] Rad	[] Ax	Date Placed:   [] PICC		[] Midline		[] Mediport  [] Urinary Catheter		Date Placed:   [x] Necessity of urinary, arterial, and venous catheters discussed    LABS  --------------------------------------------------------------------------------------  Labs:  CAPILLARY BLOOD GLUCOSE                              9.9    10.30 )-----------( 199      ( 07 Sep 2019 00:09 )             30.3         09-07    141  |  101  |  18  ----------------------------<  168<H>  3.7   |  30  |  0.99      eGFR if Non : 75 mL/min (09-07-19 @ 00:09)      LFTs:             5.5  | 0.3  | 18       ------------------[50      ( 07 Sep 2019 00:09 )  3.0  | x    | 7           Lipase:x      Amylase:x         Blood Gas Arterial - Lactate: 1.7 mmol/L (09-06-19 @ 00:30)  Blood Gas Arterial - Lactate: 2.1 mmol/L (09-05-19 @ 21:11)  Blood Gas Venous - Lactate: 1.1 mmol/L (09-04-19 @ 08:53)    ABG - ( 06 Sep 2019 00:30 )  pH: 7.47  /  pCO2: 42    /  pO2: 129   / HCO3: 30    / Base Excess: 6.3   /  SaO2: 99.1            ABG - ( 05 Sep 2019 21:11 )  pH: 7.45  /  pCO2: 40    /  pO2: 118   / HCO3: 28    / Base Excess: 3.9   /  SaO2: 98.4            ABG - ( 05 Sep 2019 20:10 )  pH: 7.52  /  pCO2: 34    /  pO2: 234   / HCO3: 29    / Base Excess: 4.1   /  SaO2: 99.6              Coags:     13.4   ----< 1.20    ( 05 Sep 2019 07:56 )     35.5                      --------------------------------------------------------------------------------------    OTHER LABORATORY:     ASSESSMENT  74 year old M with laryngeal cancer thought to be secondary to chronic GERD with extralaryngeal spread, HTN, HLD s/p laryngectomy, neck dissection, tracheostomy, reconstruction of anterior pharynx with radial forearm flap from left arm, skin graft from left thigh to forearm donor site, thyroidectomy with reimplantation with ENT and plastic surgery admitted to SICU for new tracheostomy and Q1H flap checks. Now Q2H flap checks will continue to monitor and downgrade    PLAN:   Neurologic:   - off sedation, no acute issues  - Oxycodone PRN for moderate to severe pain via PEG tube, IV Dilaudid overnight PRN    Respiratory:  - S/p laryngectomy, tracheostomy in place   - C/w mechanical ventilation via trach collar, VC , RR 12, PEEP 5, FiO2 40%  - Pressure support/ SBT in AM  - Q2H pharyngeal flap checks    Cardiovascular:   - BP monitoring, c/w home HTN medications Amlodipine and Losartan    Gastrointestinal/Nutrition:   - NPO, NGT placement confirmed w/ CXR   - will start tube feeds    Renal/Genitourinary:   - No issues  - LR at 75 mL/hr  - Trend BMP  - Moore removed    Hematologic:   -  mg rectal given post op  - 81 mg daily for flap perfusion  - Lovenox for DVT prophylaxis    Infectious Disease:   - Penicillin allergic, continue with Clindamycin    Lines/Tubes:  PIV x 2, R radial arterial line    Endocrine:   - S/p total thyroidectomy- continue with PO Synthroid  - PTH low, as expected due to stunning, continue to replete calcium   - Q8H BMP, ionized calcium  - Calcium supplementation- Calcitriol 0.5 mcg BID, Calcium carbonate 2500 mg TID    Disposition: SICU    --------------------------------------------------------------------------------------  Critical Care Diagnoses: Laryngeal cancer, respiratory failure, parathyroid dysfunction/ hypocalcemia SICU Daily Progress Note  =====================================================  Interval/Overnight Events:     - Moore removed  - flap checks stable  - tube feeds started    POD #  2        	SICU Day #    2    HPI:  HPI:  74 year old M with 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 He underwent laryngectomy, neck dissection, tracheostomy, reconstruction of anterior pharynx with radial forearm flap from left arm, skin graft from left thigh to forearm donor site, thyroidectomy with reimplantation with ENT and plastic surgery. Case was routine. Admitted to SICU paralyzed, mechanically ventilated via new tracheostomy, to have Q1H flap checks.   Allergies: penicillin (Rash)      Allergies: penicillin (Rash)      MEDICATIONS:   --------------------------------------------------------------------------------------  Neurologic Medications  acetaminophen    Suspension .. 650 milliGRAM(s) Oral every 6 hours PRN Mild Pain (1 - 3)  oxyCODONE    IR 5 milliGRAM(s) Oral every 3 hours PRN Moderate Pain (4 - 6)  oxyCODONE    IR 10 milliGRAM(s) Oral every 3 hours PRN Severe Pain (7 - 10)    Respiratory Medications    Cardiovascular Medications  amLODIPine   Tablet 10 milliGRAM(s) Oral daily  hydrochlorothiazide 25 milliGRAM(s) Oral daily    Gastrointestinal Medications  calcitriol  Solution 0.5 MICROGram(s) Oral <User Schedule>  calcium carbonate   Suspension 2500 milliGRAM(s) Oral three times a day  magnesium sulfate  IVPB 2 Gram(s) IV Intermittent once    Genitourinary Medications    Hematologic/Oncologic Medications  aspirin  chewable 81 milliGRAM(s) Enteral Tube daily  enoxaparin Injectable 40 milliGRAM(s) SubCutaneous daily    Antimicrobial/Immunologic Medications  clindamycin IVPB      clindamycin IVPB 600 milliGRAM(s) IV Intermittent every 8 hours    Endocrine/Metabolic Medications  levothyroxine 125 MICROGram(s) Oral daily  simvastatin 20 milliGRAM(s) Oral at bedtime    Topical/Other Medications  chlorhexidine 0.12% Liquid 15 milliLiter(s) Oral Mucosa every 12 hours  chlorhexidine 4% Liquid 1 Application(s) Topical daily    --------------------------------------------------------------------------------------    VITAL SIGNS, INS/OUTS (last 24 hours):  --------------------------------------------------------------------------------------  ICU Vital Signs Last 24 Hrs  T(C): 36.7 (07 Sep 2019 00:00), Max: 36.8 (06 Sep 2019 20:00)  T(F): 98.1 (07 Sep 2019 00:00), Max: 98.3 (06 Sep 2019 20:00)  HR: 91 (07 Sep 2019 01:00) (68 - 95)  BP: --  BP(mean): --  ABP: 139/61 (07 Sep 2019 01:00) (103/49 - 139/61)  ABP(mean): 86 (07 Sep 2019 01:00) (68 - 101)  RR: 24 (07 Sep 2019 01:00) (13 - 24)  SpO2: 97% (07 Sep 2019 01:00) (93% - 100%)    I&O's Summary    05 Sep 2019 07:01  -  06 Sep 2019 07:00  --------------------------------------------------------  IN: 989 mL / OUT: 535 mL / NET: 454 mL    06 Sep 2019 07:01  -  07 Sep 2019 02:40  --------------------------------------------------------  IN: 1035 mL / OUT: 915 mL / NET: 120 mL      --------------------------------------------------------------------------------------  EXAM  NEUROLOGY  GCS: 15  Exam: on trach collar, awake, alert, responds to commands    HEENT  Exam: Normocephalic, atraumatic. Neck incision C/D/I. Right and Left neck JPs with sanguinous output.     RESPIRATORY  Exam: Lungs clear to auscultation, Normal expansion/effort. trach collar    CARDIOVASCULAR  Exam: S1, S2.  Regular rate and rhythm.     GI/NUTRITION  Exam: Abdomen soft, Non-tender, Non-distended.  NGT sutured in place.    Current Diet:  NPO    VASCULAR  Exam: Extremities warm, pink, well-perfused.     MUSCULOSKELETAL  Exam: L thigh donor site wrapped with ACE bandage, C/D/I. Right forearm wrapped in ACE and IDA drain with sanguinous output. All extremities moving spontaneously without limitations.     SKIN  Exam: Good skin turgor, no skin breakdown.     METABOLIC/FLUIDS/ELECTROLYTES  calcitriol  Solution 0.5 MICROGram(s) Oral <User Schedule>  calcium carbonate   Suspension 2500 milliGRAM(s) Oral three times a day  magnesium sulfate  IVPB 2 Gram(s) IV Intermittent once      HEMATOLOGIC  [x] VTE Prophylaxis: aspirin  chewable 81 milliGRAM(s) Enteral Tube daily  enoxaparin Injectable 40 milliGRAM(s) SubCutaneous daily    Transfusions:	[] PRBC	[] Platelets		[] FFP	[] Cryoprecipitate    INFECTIOUS DISEASE  Antimicrobials/Immunologic Medications:  clindamycin IVPB      clindamycin IVPB 600 milliGRAM(s) IV Intermittent every 8 hours    Day #      of         Tubes/Lines/Drains  [x] Peripheral IV  [] Central Venous Line     	[] R	[] L	[] IJ	[] Fem	[] SC	Date Placed:   [] Arterial Line		[] R	[] L	[] Fem	[] Rad	[] Ax	Date Placed:   [] PICC		[] Midline		[] Mediport  [] Urinary Catheter		Date Placed:   [x] Necessity of urinary, arterial, and venous catheters discussed    LABS  --------------------------------------------------------------------------------------  Labs:  CAPILLARY BLOOD GLUCOSE                              9.9    10.30 )-----------( 199      ( 07 Sep 2019 00:09 )             30.3         09-07    141  |  101  |  18  ----------------------------<  168<H>  3.7   |  30  |  0.99      eGFR if Non : 75 mL/min (09-07-19 @ 00:09)      LFTs:             5.5  | 0.3  | 18       ------------------[50      ( 07 Sep 2019 00:09 )  3.0  | x    | 7           Lipase:x      Amylase:x         Blood Gas Arterial - Lactate: 1.7 mmol/L (09-06-19 @ 00:30)  Blood Gas Arterial - Lactate: 2.1 mmol/L (09-05-19 @ 21:11)  Blood Gas Venous - Lactate: 1.1 mmol/L (09-04-19 @ 08:53)    ABG - ( 06 Sep 2019 00:30 )  pH: 7.47  /  pCO2: 42    /  pO2: 129   / HCO3: 30    / Base Excess: 6.3   /  SaO2: 99.1            ABG - ( 05 Sep 2019 21:11 )  pH: 7.45  /  pCO2: 40    /  pO2: 118   / HCO3: 28    / Base Excess: 3.9   /  SaO2: 98.4            ABG - ( 05 Sep 2019 20:10 )  pH: 7.52  /  pCO2: 34    /  pO2: 234   / HCO3: 29    / Base Excess: 4.1   /  SaO2: 99.6              Coags:     13.4   ----< 1.20    ( 05 Sep 2019 07:56 )     35.5                      --------------------------------------------------------------------------------------    OTHER LABORATORY:     ASSESSMENT  74 year old M with laryngeal cancer thought to be secondary to chronic GERD with extralaryngeal spread, HTN, HLD s/p laryngectomy, neck dissection, tracheostomy, reconstruction of anterior pharynx with radial forearm flap from left arm, skin graft from left thigh to forearm donor site, thyroidectomy with reimplantation with ENT and plastic surgery admitted to SICU for new tracheostomy and Q1H flap checks. Now Q2H flap checks will continue to monitor and downgrade    PLAN:   Neurologic:   - off sedation, no acute issues  - Oxycodone PRN for moderate to severe pain via PEG tube, IV Dilaudid overnight PRN    Respiratory:  - S/p laryngectomy, tracheostomy in place   - trach collar  - Pressure support/ SBT in AM  - Q2H pharyngeal flap checks    Cardiovascular:   - BP monitoring, c/w home HTN medications Amlodipine and Losartan    Gastrointestinal/Nutrition:   - NPO, NGT placement confirmed w/ CXR   -start tube feeds    Renal/Genitourinary:   - No issues  - Trend BMP  - Moore removed    Hematologic:   -  mg rectal given post op  - 81 mg daily for flap perfusion  - Lovenox for DVT prophylaxis    Infectious Disease:   - Penicillin allergic, continue with Clindamycin    Lines/Tubes:  PIV x 2, R radial arterial line    Endocrine:   - S/p total thyroidectomy- continue with PO Synthroid  - PTH low, as expected due to stunning, continue to replete calcium   - Q8H BMP, ionized calcium  - Calcium supplementation- Calcitriol 0.5 mcg BID, Calcium carbonate 2500 mg TID    Disposition: SICU    --------------------------------------------------------------------------------------  Critical Care Diagnoses: Laryngeal cancer, respiratory failure, parathyroid dysfunction/ hypocalcemia

## 2019-09-07 NOTE — PROGRESS NOTE ADULT - ASSESSMENT
POD2 from a total laryngectomy, b/l neck dissection, total thyroidectomy, with LRFF and STSG for reconstruction.    - Recovering well  - Flap doing well   - Ambulate today   - ok to start tube feeds per PRS perspective   - care per SICU    Plastic Surgery  [id number]

## 2019-09-07 NOTE — PROGRESS NOTE ADULT - SUBJECTIVE AND OBJECTIVE BOX
Plastic Surgery Note:    Subjective:    Patient is now POD2 from a total laryngectomy, b/l neck dissection, total thyroidectomy, with LRFF and STSG for reconstruction. Patient doing well in SICU this AM. Comfortable without c/o.     Objective:    ICU Vital Signs Last 24 Hrs  T(C): 36.8 (07 Sep 2019 12:00), Max: 37.3 (07 Sep 2019 04:00)  T(F): 98.3 (07 Sep 2019 12:00), Max: 99.1 (07 Sep 2019 04:00)  HR: 84 (07 Sep 2019 12:00) (78 - 95)  BP: --  BP(mean): --  ABP: 124/42 (07 Sep 2019 12:00) (104/48 - 145/61)  ABP(mean): 67 (07 Sep 2019 12:00) (65 - 103)  RR: 22 (07 Sep 2019 12:00) (14 - 24)  SpO2: 99% (07 Sep 2019 12:00) (94% - 100%)    I&O's Detail    06 Sep 2019 07:01  -  07 Sep 2019 07:00  --------------------------------------------------------  IN:    Enteral Tube Flush: 100 mL    IV PiggyBack: 100 mL    ns in tub fed  [id number]: 995 mL  Total IN: 1195 mL    OUT:    Bulb: 15 mL    Bulb: 45 mL    Bulb: 105 mL    Indwelling Catheter - Urethral: 775 mL    Voided: 200 mL  Total OUT: 1140 mL    Total NET: 55 mL      07 Sep 2019 07:01  -  07 Sep 2019 13:41  --------------------------------------------------------  IN:    ns in tub fed  [id number]: 400 mL  Total IN: 400 mL    OUT:    Voided: 200 mL  Total OUT: 200 mL    Total NET: 200 mL            Physical Exam:    GEN: well appearing, NAD  NECK: cook doppler with good signal, flap soft, non-congested in appearance without excessive swelling.  EXT: Wound vac in place     LABS:    09-07    141  |  101  |  18  ----------------------------<  168<H>  3.7   |  30  |  0.99    Ca    8.0<L>      07 Sep 2019 00:09  Phos  2.7     09-07  Mg     1.8     09-07    TPro  5.5<L>  /  Alb  3.0<L>  /  TBili  0.3  /  DBili  x   /  AST  18  /  ALT  7   /  AlkPhos  50  09-07                          9.9    10.30 )-----------( 199      ( 07 Sep 2019 00:09 )             30.3

## 2019-09-08 LAB
ANION GAP SERPL CALC-SCNC: 9 MMO/L — SIGNIFICANT CHANGE UP (ref 7–14)
BUN SERPL-MCNC: 16 MG/DL — SIGNIFICANT CHANGE UP (ref 7–23)
CALCIUM SERPL-MCNC: 7.8 MG/DL — LOW (ref 8.4–10.5)
CHLORIDE SERPL-SCNC: 97 MMOL/L — LOW (ref 98–107)
CO2 SERPL-SCNC: 34 MMOL/L — HIGH (ref 22–31)
CREAT SERPL-MCNC: 0.9 MG/DL — SIGNIFICANT CHANGE UP (ref 0.5–1.3)
GLUCOSE BLDC GLUCOMTR-MCNC: 113 MG/DL — HIGH (ref 70–99)
GLUCOSE SERPL-MCNC: 181 MG/DL — HIGH (ref 70–99)
HCT VFR BLD CALC: 31.8 % — LOW (ref 39–50)
HGB BLD-MCNC: 10 G/DL — LOW (ref 13–17)
MAGNESIUM SERPL-MCNC: 1.9 MG/DL — SIGNIFICANT CHANGE UP (ref 1.6–2.6)
MCHC RBC-ENTMCNC: 28.7 PG — SIGNIFICANT CHANGE UP (ref 27–34)
MCHC RBC-ENTMCNC: 31.4 % — LOW (ref 32–36)
MCV RBC AUTO: 91.4 FL — SIGNIFICANT CHANGE UP (ref 80–100)
NRBC # FLD: 0 K/UL — SIGNIFICANT CHANGE UP (ref 0–0)
PHOSPHATE SERPL-MCNC: 3.7 MG/DL — SIGNIFICANT CHANGE UP (ref 2.5–4.5)
PLATELET # BLD AUTO: 204 K/UL — SIGNIFICANT CHANGE UP (ref 150–400)
PMV BLD: 10.1 FL — SIGNIFICANT CHANGE UP (ref 7–13)
POTASSIUM SERPL-MCNC: 3.6 MMOL/L — SIGNIFICANT CHANGE UP (ref 3.5–5.3)
POTASSIUM SERPL-SCNC: 3.6 MMOL/L — SIGNIFICANT CHANGE UP (ref 3.5–5.3)
RBC # BLD: 3.48 M/UL — LOW (ref 4.2–5.8)
RBC # FLD: 13.1 % — SIGNIFICANT CHANGE UP (ref 10.3–14.5)
SODIUM SERPL-SCNC: 140 MMOL/L — SIGNIFICANT CHANGE UP (ref 135–145)
T3 SERPL-MCNC: 68.4 NG/DL — LOW (ref 80–200)
T4 AB SER-ACNC: 5.89 UG/DL — SIGNIFICANT CHANGE UP (ref 5.1–13)
TSH SERPL-MCNC: 3.58 UIU/ML — SIGNIFICANT CHANGE UP (ref 0.27–4.2)
WBC # BLD: 8.8 K/UL — SIGNIFICANT CHANGE UP (ref 3.8–10.5)
WBC # FLD AUTO: 8.8 K/UL — SIGNIFICANT CHANGE UP (ref 3.8–10.5)

## 2019-09-08 PROCEDURE — 93010 ELECTROCARDIOGRAM REPORT: CPT

## 2019-09-08 RX ORDER — SENNA PLUS 8.6 MG/1
10 TABLET ORAL AT BEDTIME
Refills: 0 | Status: DISCONTINUED | OUTPATIENT
Start: 2019-09-08 | End: 2019-09-17

## 2019-09-08 RX ADMIN — Medication 25 MILLIGRAM(S): at 06:14

## 2019-09-08 RX ADMIN — AMLODIPINE BESYLATE 10 MILLIGRAM(S): 2.5 TABLET ORAL at 06:06

## 2019-09-08 RX ADMIN — SENNA PLUS 10 MILLILITER(S): 8.6 TABLET ORAL at 21:50

## 2019-09-08 RX ADMIN — Medication 100 MILLIGRAM(S): at 12:52

## 2019-09-08 RX ADMIN — Medication 137 MICROGRAM(S): at 06:07

## 2019-09-08 RX ADMIN — Medication 2500 MILLIGRAM(S): at 06:13

## 2019-09-08 RX ADMIN — Medication 100 MILLIGRAM(S): at 15:52

## 2019-09-08 RX ADMIN — Medication 2500 MILLIGRAM(S): at 14:28

## 2019-09-08 RX ADMIN — SIMVASTATIN 20 MILLIGRAM(S): 20 TABLET, FILM COATED ORAL at 21:50

## 2019-09-08 RX ADMIN — Medication 2500 MILLIGRAM(S): at 21:50

## 2019-09-08 RX ADMIN — ENOXAPARIN SODIUM 40 MILLIGRAM(S): 100 INJECTION SUBCUTANEOUS at 12:52

## 2019-09-08 RX ADMIN — CALCITRIOL 0.5 MICROGRAM(S): 0.5 CAPSULE ORAL at 06:06

## 2019-09-08 RX ADMIN — CALCITRIOL 0.5 MICROGRAM(S): 0.5 CAPSULE ORAL at 17:37

## 2019-09-08 RX ADMIN — Medication 81 MILLIGRAM(S): at 12:52

## 2019-09-08 NOTE — OCCUPATIONAL THERAPY INITIAL EVALUATION ADULT - PATIENT/FAMILY/SIGNIFICANT OTHER GOALS STATEMENT, OT EVAL
Pt. with tracheostomy. Pt. communicating via gestures and writing using pen & paper. Pt. reports he wants to get better.

## 2019-09-08 NOTE — RAPID RESPONSE TEAM SUMMARY - NSADDTLFINDINGSRRT_GEN_ALL_CORE
Vital signs stable throughout event O2 sat 92%  Patient mentating well, answering questions appropriately

## 2019-09-08 NOTE — OCCUPATIONAL THERAPY INITIAL EVALUATION ADULT - LEVEL OF INDEPENDENCE: SIT/SUPINE, REHAB EVAL
Not assessed. Pt left sitting in chair next to bed with all lines/tubes intact. SILVANA Richards, multiple RN's (Claudia WEEKS, Chelsea TREVINO, and Minnie SOLIS) and staff members at bedside as pt. is s/p surgical rapid response. Will continue to follow pt while inpatient. See below for details.

## 2019-09-08 NOTE — PROGRESS NOTE ADULT - ASSESSMENT
74M POD3 s/p total laryngectomy, b/l neck dissection, total thyroidectomy, with LRFF and STSG for reconstruction.    -cont q4h flap check   -encourage oob/ambo  -care per primary team

## 2019-09-08 NOTE — OCCUPATIONAL THERAPY INITIAL EVALUATION ADULT - GENERAL OBSERVATIONS, REHAB EVAL
Pt. received semisupine in bed, RN Minnie SOLIS present. No acute distress and offers no complaints at rest. Vital signs assessed and stable. Patient agreed to evaluation from Occupational Therapist. +Heplock, +(3) IDA Drains, +Ace Wrap to Left UE, +Pulse ox to Right finger, +NG tube, +Trach., +Wound VAC. PT Bill Tejada present throughout.

## 2019-09-08 NOTE — OCCUPATIONAL THERAPY INITIAL EVALUATION ADULT - MD ORDER
Occupational Therapy (OT) to evaluate and treat. Occupational Therapy (OT) to evaluate and treat. Out of Bed to Chair. Ambulate as Tolerated. Per SANDRA Menon, pt is okay to participate in OT evaluation and perform activity as tolerated.

## 2019-09-08 NOTE — OCCUPATIONAL THERAPY INITIAL EVALUATION ADULT - DIAGNOSIS, OT EVAL
s/p laryngectomy, neck dissection, tracheostomy, reconstruction of anterior pharynx with radial forearm flap from left arm, skin graft from left thigh to forearm donor site, thyroidectomy with reimplantation and partial parathyroidectomy; Decreased functional mobility; Decreased ADL management

## 2019-09-08 NOTE — OCCUPATIONAL THERAPY INITIAL EVALUATION ADULT - ANTICIPATED DISCHARGE DISPOSITION, OT EVAL
Anticipate Home with OT to improve performance with ADL management. However, please continue to follow progress notes closely. Pt is s/p rapid response during evaluation today.

## 2019-09-08 NOTE — RAPID RESPONSE TEAM SUMMARY - NSSITUATIONBACKGROUNDRRT_GEN_ALL_CORE
75 y/o male s/p reconstruction of anterior pharynx after total laryngectomy with radial forearm flap from left arm, stsg from left thigh to forearm donor site on 9/5.  Patient was walking in the hallway with PT and said he felt dizzy and passed out briefly.  Patient was caught by Physical Therapist who advised that patient had LOC for about 15 secs.  Patient was brought back to the room and placed in the chair.

## 2019-09-08 NOTE — OCCUPATIONAL THERAPY INITIAL EVALUATION ADULT - ADDITIONAL COMMENTS
Pt left sitting in chair next to bed with all lines/tubes intact. SILVANA Richards, multiple RN's (Claudia WEEKS, Chelsea TREVINO, and Minnie SOLIS) and staff members at bedside as pt. is s/p surgical rapid response. Will continue to follow pt while inpatient. See below for details.

## 2019-09-08 NOTE — PROVIDER CONTACT NOTE (FALL NOTIFICATION) - SITUATION
Patient ambulating with 2 PT and walker in hallway. Patient reported feeling dizziness and then experienced LOC and was helped to the floor by PT without injury. RRT called

## 2019-09-08 NOTE — OCCUPATIONAL THERAPY INITIAL EVALUATION ADULT - LIVES WITH, PROFILE
Pt. reports he lives alone in a house with about 2 steps to enter. Once inside, pt. reports he has a full flight of steps to negotiate to reach 2nd floor where main bedroom and bathroom are located. Pt. explains he plans to go to his daughter's house post-hospital discharge, +few steps to enter, +bedroom and bathroom located on the main level.

## 2019-09-08 NOTE — OCCUPATIONAL THERAPY INITIAL EVALUATION ADULT - PERTINENT HX OF CURRENT PROBLEM, REHAB EVAL
Pt is a 74 year old male with known T4 larynx cancer with extralaryngeal spread, HTN, & HLD, who presented to [facility] on 9/4/19 with shortness of breath one day prior to his scheduled surgery for laryngeal cancer. Pt underwent laryngectomy, neck dissection, tracheostomy, reconstruction of anterior pharynx with radial forearm flap from left arm, skin graft from left thigh to forearm donor site, thyroidectomy with reimplantation and partial parathyroidectomy on 9/5/19.

## 2019-09-08 NOTE — OCCUPATIONAL THERAPY INITIAL EVALUATION ADULT - IMPAIRED TRANSFERS: SIT/STAND, REHAB EVAL
Upon standing at edge of bed, pt. remained in no acute distress and offered no complaints. Pt performed functional mobility activity in room and hallway with contact guard assistance x1+1 and use of rolling walker with this OT and PT Bill Tejada. After approxiamtely 3-4 minutes of activity in hallway, pt. reported dizziness. Attempted to assist pt. immediately to a chair; however, noted [+] loss of consciousness and bilateral LE buckling, not appropriate. This OT and PT Bill Tejada safely and gently assisted pt. to floor with head supported throughout (head contact never made with floor), pt.'s bilateral legs elevated once safely on the floor. Rapid Response team called simutaneously with events. Multiple RN's (Claudia WEEKS, Chelsea TREVINO, and Minnie SOLIS) and staff members immidately present to assist. Pt. provided pillow for head. Vitals taken immediately semisupine on hallway floor: /63 mmHg, HR 88 bpm, O2 94%. Noted loss of consciousness episode approxiamtely 15 seconds. Pt. reported he remembers feeling dizzy and then passed out briefly and now feeling better. When ENT team arrived, pt cleared to slowly attempt to transfer from floor to chair. This performed with this OT and PT and two additional RN's for safety. Pt. wheeled back to room in chair. Vitals reassessed with pt. sitting in chair: /48 mmHg, 85 bpm, O2 91%. Pt left sitting in chair next to bed with all lines/tubes intact. Pt. reporting he continues to feel okay. SILVANA Richards, multiple RN's (Claudia WEEKS, Chelsea TREVINO, and Minnie SOLIS) and staff members at bedside. Will continue to follow pt while inpatient. Post response team's assessment, this OT and PT participated in debrief./decreased strength

## 2019-09-08 NOTE — PROGRESS NOTE ADULT - SUBJECTIVE AND OBJECTIVE BOX
Patient seen and examined at the bedside. No acute events overnight. Came to floor overnight. Tube feeds bolused. Endocrine evaluated patient yesterday and synthroid increased to 137 mcg. TFTs pending. Will follow daily calciums, have been stable. LT in place.     Vital Signs Last 24 Hrs  T(C): 37 (07 Sep 2019 08:00), Max: 37.3 (07 Sep 2019 04:00)  T(F): 98.6 (07 Sep 2019 08:00), Max: 99.1 (07 Sep 2019 04:00)  HR: 81 (07 Sep 2019 10:00) (73 - 95)  BP: --  BP(mean): --  RR: 19 (07 Sep 2019 10:00) (13 - 24)  SpO2: 98% (07 Sep 2019 10:00) (93% - 100%)    NAD, awake and alert  Breathing comfortably on humidified TC   OC/OP: wnl  Neck: soft, flat, bilateral IDA with SS drainage. Incision c/d/i   LT in place, humidified trach collar  Cook doppler signal strong  L arm with ACE wrap, wound vac in place and IDA with scant drainage     A/P: 74M s/p TL, TT, bilateral SND, L RFFF, L STSG 9/4   - LT  -IS/CPT  -start continuous tube feeds  -IDA to bulb suction  [email or web address] doppler  -OOBTC   -PT/OT  -PM&R   -asa/lov  -clinda  -synthroid  -calcium repletions IV PRN  -max oral repletions  - daily calciums  - endocrine: f/u TFTs, outpatient f/u, synthroid adjusted

## 2019-09-08 NOTE — PROGRESS NOTE ADULT - SUBJECTIVE AND OBJECTIVE BOX
Plastic Surgery    SUBJECTIVE: Pt seen and examined on rounds with team. NAEON. afebrile    VITALS  T(C): 36.8 (09-08-19 @ 05:05), Max: 37.6 (09-07-19 @ 20:00)  HR: 81 (09-08-19 @ 05:05) (81 - 89)  BP: 149/56 (09-08-19 @ 05:05) (133/60 - 149/56)  RR: 17 (09-08-19 @ 05:05) (13 - 23)  SpO2: 95% (09-08-19 @ 05:05) (95% - 99%)  CAPILLARY BLOOD GLUCOSE          Is/Os    09-07 @ 07:01  -  09-08 @ 07:00  --------------------------------------------------------  IN:    Enteral Tube Flush: 230 mL    IV PiggyBack: 50 mL    ns in tub fed  pjaode85: 1840 mL  Total IN: 2120 mL    OUT:    Bulb: 15 mL    Bulb: 28 mL    Bulb: 12.5 mL    Voided: 850 mL  Total OUT: 905.5 mL    Total NET: 1214.5 mL          PHYSICAL EXAM:   GEN: well appearing, NAD  NECK: cook doppler with good signal, flap soft, non-congested in appearance without excessive swelling.  EXT: Wound vac in place LUE     MEDICATIONS (STANDING): amLODIPine   Tablet 10 milliGRAM(s) Oral daily  aspirin  chewable 81 milliGRAM(s) Enteral Tube daily  calcitriol  Solution 0.5 MICROGram(s) Oral <User Schedule>  calcium carbonate   Suspension 2500 milliGRAM(s) Oral three times a day  docusate sodium Liquid 100 milliGRAM(s) Oral daily  enoxaparin Injectable 40 milliGRAM(s) SubCutaneous daily  hydrochlorothiazide 25 milliGRAM(s) Oral daily  levothyroxine 137 MICROGram(s) Oral daily  senna Syrup 10 milliLiter(s) Oral once  simvastatin 20 milliGRAM(s) Oral at bedtime    MEDICATIONS (PRN):acetaminophen    Suspension .. 650 milliGRAM(s) Oral every 6 hours PRN Mild Pain (1 - 3)  oxyCODONE    IR 5 milliGRAM(s) Oral every 3 hours PRN Moderate Pain (4 - 6)  oxyCODONE    IR 10 milliGRAM(s) Oral every 3 hours PRN Severe Pain (7 - 10)      LABS  CBC (09-08 @ 06:35)                              10.0<L>                         8.80    )----------------(  204        --    % Neutrophils, --    % Lymphocytes, ANC: --                                  31.8<L>  CBC (09-07 @ 00:09)                              9.9<L>                         10.30   )----------------(  199        --    % Neutrophils, --    % Lymphocytes, ANC: --                                  30.3<L>    BMP (09-08 @ 06:35)             140     |  97<L>   |  16    		Ca++ --      Ca 7.8<L>             ---------------------------------( 181<H>		Mg 1.9                3.6     |  34<H>   |  0.90  			Ph 3.7     BMP (09-07 @ 21:40)             139     |  99      |  16    		Ca++ --      Ca 7.9<L>             ---------------------------------( 150<H>		Mg 1.9                3.9     |  33<H>   |  0.93  			Ph 3.6       LFTs (09-07 @ 21:40)      TPro 5.9<L> / Alb 2.9<L> / TBili 0.3 / DBili -- / AST 15 / ALT 8 / AlkPhos 51  LFTs (09-07 @ 14:20)      TPro 6.0 / Alb 3.2<L> / TBili 0.3 / DBili -- / AST 14 / ALT 9 / AlkPhos 54              IMAGING STUDIES

## 2019-09-09 ENCOUNTER — TRANSCRIPTION ENCOUNTER (OUTPATIENT)
Age: 74
End: 2019-09-09

## 2019-09-09 LAB
ANION GAP SERPL CALC-SCNC: 10 MMO/L — SIGNIFICANT CHANGE UP (ref 7–14)
BUN SERPL-MCNC: 16 MG/DL — SIGNIFICANT CHANGE UP (ref 7–23)
CALCIUM SERPL-MCNC: 7.9 MG/DL — LOW (ref 8.4–10.5)
CHLORIDE SERPL-SCNC: 95 MMOL/L — LOW (ref 98–107)
CO2 SERPL-SCNC: 32 MMOL/L — HIGH (ref 22–31)
CREAT SERPL-MCNC: 0.91 MG/DL — SIGNIFICANT CHANGE UP (ref 0.5–1.3)
GLUCOSE SERPL-MCNC: 119 MG/DL — HIGH (ref 70–99)
POTASSIUM SERPL-MCNC: 3.7 MMOL/L — SIGNIFICANT CHANGE UP (ref 3.5–5.3)
POTASSIUM SERPL-SCNC: 3.7 MMOL/L — SIGNIFICANT CHANGE UP (ref 3.5–5.3)
SODIUM SERPL-SCNC: 137 MMOL/L — SIGNIFICANT CHANGE UP (ref 135–145)

## 2019-09-09 PROCEDURE — 99233 SBSQ HOSP IP/OBS HIGH 50: CPT | Mod: GC

## 2019-09-09 PROCEDURE — 99222 1ST HOSP IP/OBS MODERATE 55: CPT

## 2019-09-09 PROCEDURE — 99233 SBSQ HOSP IP/OBS HIGH 50: CPT

## 2019-09-09 RX ADMIN — Medication 81 MILLIGRAM(S): at 12:32

## 2019-09-09 RX ADMIN — ENOXAPARIN SODIUM 40 MILLIGRAM(S): 100 INJECTION SUBCUTANEOUS at 12:33

## 2019-09-09 RX ADMIN — CALCITRIOL 0.5 MICROGRAM(S): 0.5 CAPSULE ORAL at 06:33

## 2019-09-09 RX ADMIN — Medication 25 MILLIGRAM(S): at 06:32

## 2019-09-09 RX ADMIN — CALCITRIOL 0.5 MICROGRAM(S): 0.5 CAPSULE ORAL at 18:10

## 2019-09-09 RX ADMIN — AMLODIPINE BESYLATE 10 MILLIGRAM(S): 2.5 TABLET ORAL at 06:32

## 2019-09-09 RX ADMIN — Medication 2500 MILLIGRAM(S): at 21:58

## 2019-09-09 RX ADMIN — SIMVASTATIN 20 MILLIGRAM(S): 20 TABLET, FILM COATED ORAL at 21:58

## 2019-09-09 RX ADMIN — Medication 100 MILLIGRAM(S): at 07:55

## 2019-09-09 RX ADMIN — Medication 100 MILLIGRAM(S): at 23:12

## 2019-09-09 RX ADMIN — Medication 2500 MILLIGRAM(S): at 06:35

## 2019-09-09 RX ADMIN — Medication 100 MILLIGRAM(S): at 00:39

## 2019-09-09 RX ADMIN — Medication 137 MICROGRAM(S): at 06:32

## 2019-09-09 RX ADMIN — Medication 100 MILLIGRAM(S): at 16:07

## 2019-09-09 NOTE — DISCHARGE NOTE PROVIDER - NSDCQMSTROKE_NEU_ALL_CORE
Telephone Encounter by Zakiya Beaulieu MD at 11/21/18 03:51 PM     Author:  Zakiya Beaulieu MD Service:  (none) Author Type:  Physician     Filed:  11/21/18 03:52 PM Encounter Date:  11/21/2018 Status:  Signed     :  Zakiya Beaulieu MD (Physician)            pt to wait one more month. if still nothing and neg HPT, she should come into the office.[CE1.1M]       Revision History        User Key Date/Time User Provider Type Action    > CE1.1 11/21/18 03:52 PM Zakiya Beaulieu MD Physician Sign    M - Manual             No

## 2019-09-09 NOTE — DIETITIAN INITIAL EVALUATION ADULT. - OTHER INFO
73 y/o Male with medical history of GERD and laryngeal cancer causing stridor now s/p laryngectomy, neck dissection, tracheostomy, reconstruction of anterior pharynx w/ L radial forearm flap and skin graft from left thigh to forearm donor site, thyroidectomy with reimplantation of parathyroid. Patient wrote to communicate with writer. Reports good appetite and PO intake PTA, consuming regular diet. He is currently maintained upon NGT receiving Jevity 1.2 at 360ml [id number] equates to total volume of 1440ml, 1728kcal and 80g pro. Patient tolerating tube feeds well. Patient denies any nausea/vomiting/diarrhea/constipation at this time. Reports usual body weight to be between 198-200lbs same documented per outpatient record via [employer]. This admission weight of 200lbs on 9/4 and later 210.5lbs on 9/7 noted. Question accuracy of documented weight. RN informed to obtain new weight to confirm.

## 2019-09-09 NOTE — DIETITIAN INITIAL EVALUATION ADULT. - ENTERAL
1. Suggest increase TF bolus feeds to 400ml [id number] equates to total volume of 1600ml, 1920kcal and 89g pro. Consider Provision of No Carb Prosource (15 grams protein/ 30 mL packet.) x 1 yields 104g pro/day. 2. Consider Swallow evaluation when medically able 3. If unable to tolerate po intake/NGT prolonged, consider alternate means of nutrition support.

## 2019-09-09 NOTE — DIETITIAN INITIAL EVALUATION ADULT. - PERTINENT LABORATORY DATA
09-09 Na137 mmol/L Glu 119 mg/dL<H> K+ 3.7 mmol/L Cr  0.91 mg/dL BUN 16 mg/dL 09-08 Phos 3.7 mg/dL 09-07 Alb 2.9 g/dL<L>

## 2019-09-09 NOTE — CONSULT NOTE ADULT - CONSULT REQUESTED DATE/TIME
04-Sep-2019 15:40
04-Sep-2019 16:14
05-Sep-2019 20:15
07-Sep-2019 13:28
09-Sep-2019 17:13
04-Sep-2019 11:53

## 2019-09-09 NOTE — PROGRESS NOTE ADULT - SUBJECTIVE AND OBJECTIVE BOX
Chief Complaint: hypocalcemia, hypothyroidism.     History: No complaints today, feels well.     MEDICATIONS  (STANDING):  amLODIPine   Tablet 10 milliGRAM(s) Oral daily  aspirin  chewable 81 milliGRAM(s) Enteral Tube daily  calcitriol  Solution 0.5 MICROGram(s) Oral <User Schedule>  calcium carbonate   Suspension 2500 milliGRAM(s) Oral three times a day  clindamycin IVPB 600 milliGRAM(s) IV Intermittent every 8 hours  docusate sodium Liquid 100 milliGRAM(s) Oral daily  enoxaparin Injectable 40 milliGRAM(s) SubCutaneous daily  hydrochlorothiazide 25 milliGRAM(s) Oral daily  levothyroxine 137 MICROGram(s) Oral daily  senna Syrup 10 milliLiter(s) Oral at bedtime  simvastatin 20 milliGRAM(s) Oral at bedtime    MEDICATIONS  (PRN):  acetaminophen    Suspension .. 650 milliGRAM(s) Oral every 6 hours PRN Mild Pain (1 - 3)  oxyCODONE    IR 5 milliGRAM(s) Oral every 3 hours PRN Moderate Pain (4 - 6)  oxyCODONE    IR 10 milliGRAM(s) Oral every 3 hours PRN Severe Pain (7 - 10)      Allergies    penicillin (Rash)    Intolerances        PHYSICAL EXAM:  VITALS: T(C): 37 (09-09-19 @ 14:00)  T(F): 98.6 (09-09-19 @ 14:00), Max: 99.1 (09-09-19 @ 09:51)  HR: 91 (09-09-19 @ 14:00) (78 - 91)  BP: 126/57 (09-09-19 @ 14:00) (126/57 - 139/61)  RR:  (16 - 20)  SpO2:  (93% - 98%)  Wt(kg): --  GENERAL: NAD, well-groomed, well-developed  RESPIRATORY: Clear to auscultation bilaterally; No rales, rhonchi, wheezing, or rubs  CARDIOVASCULAR: Regular rate and rhythm; No murmurs  NEURO: AOx3, moves all extremities spontaenuously   PSYCH:  reactive affect, euthymic mood      POCT Blood Glucose.: 113 mg/dL (09-08-19 @ 11:54)      09-09    137  |  95<L>  |  16  ----------------------------<  119<H>  3.7   |  32<H>  |  0.91    EGFR if : 97  EGFR if non : 83    Ca    7.9<L>      09-09  Mg     1.9     09-08  Phos  3.7     09-08    TPro  5.9<L>  /  Alb  2.9<L>  /  TBili  0.3  /  DBili  x   /  AST  15  /  ALT  8   /  AlkPhos  51  09-07          Thyroid Function Tests:  09-08 @ 06:35 TSH 3.58 FreeT4 -- T3 68.4 Anti TPO -- Anti Thyroglobulin Ab -- TSI --

## 2019-09-09 NOTE — DIETITIAN INITIAL EVALUATION ADULT. - ADD RECOMMEND
1. Monitor weights, labs, BM's, skin integrity, tolerance to EN. 2. Further adjustments to rate/volume/duration/free water provision of enteral feeds dependant on long term monitoring of patient's tolerance, weight trends and needs.

## 2019-09-09 NOTE — PROGRESS NOTE ADULT - ASSESSMENT
74 year old male with known T4 larynx cancer with extralaryngeal spread, HTN, HLD who presents to the ED with shortness of breath one day prior to his scheduled surgery for laryngeal cancer. Patient underwent laryngectomy, neck dissection, tracheostomy, reconstruction of anterior pharynx with radial forearm flap from left arm, skin graft from left thigh to forearm donor site, thyroidectomy with reimplantation and partial parathyroidectomy on 9/5/19. Endocrine team consulted for post op hypothyroidism and hypoparathyroidism.

## 2019-09-09 NOTE — PROGRESS NOTE ADULT - ATTENDING COMMENTS
Agree with plan as outlined.  Make sure levothyroxine is being given on empty stomach at least 30 minutes prior to enteral bolus feeding.

## 2019-09-09 NOTE — DIETITIAN INITIAL EVALUATION ADULT. - ENERGY NEEDS
Weight on admission 200lbs/90.7kg Height 5'10" BMI =28.7kg/m2  IBW:166lbs (+/-10%) %IBW:120%  No edema or pressure injury noted.

## 2019-09-09 NOTE — PROGRESS NOTE ADULT - ASSESSMENT
73 y/o man with HTN, HLD, carotid stenosis laryngeal ca with extralaryngeal spread who presented with SOB now s/p total laryngectomy, b/l neck dissection, total thyroidectomy, with LRFF and STSG for reconstruction.    #Laryngeal ca: s/p total laryngectomy, b/l neck dissection, total thyroidectomy, with LRFF and STSG for reconstruction on 9/5/19.   - Care per ENT/plastic surgery, appreciate input  - pain currently well controlled    #Hypocalcemia:  - continue calcitriol, calcium carbonate  - monitor Ca level    #HTN: continue home medication regimen  - HCTZ 25 mg daily  - amlodipine 10 mg daily  - BP acceptable    #HLD: continue statin    #Carotid stenosis: continue aspirin    #Hypothyroidism: Continue synthroid    #DVT ppx: Lovenox subq

## 2019-09-09 NOTE — DISCHARGE NOTE PROVIDER - CARE PROVIDER_API CALL
Kel Johnston)  [facility]; Otolaryngology  [address] [place] [id number]  Phone: [phone]  Fax: [phone]  Follow Up Time:

## 2019-09-09 NOTE — SPEECH LANGUAGE PATHOLOGY EVALUATION - SPECIFY REASON(S)
to assess for use of electrolarynx in order to facilitate functional communication s/p total laryngectomy with TEP placement

## 2019-09-09 NOTE — PROGRESS NOTE ADULT - PROBLEM SELECTOR PLAN 2
s/p total thyroidectomy along with laryngectomy and partial parathyroidectomy for laryngeal cancer.   TSH -3.5, TT4 5.89, TT3 68. Patient is clinically euthyroid.   - c/w LT4 137 mcg NGT     f/u Dr. Martinez s/p total thyroidectomy along with laryngectomy and partial parathyroidectomy for laryngeal cancer.   TSH -3.5, TT4 5.89, TT3 68. Patient is clinically euthyroid.   - c/w LT4 137 mcg NGT   - when giving bolus feeds, please give it at least 30 minutes after Levothyroxine     f/u Dr. Martinez

## 2019-09-09 NOTE — SPEECH LANGUAGE PATHOLOGY EVALUATION - COMMENTS
The patient is a 73 year-old male with history of laryngeal cancer who is known to our outpatient department from pre-laryngectomy education on 9/3/19 re: post-op changes in swallowing/communication. Patient is now s/p total laryngectomy with TEP, neck dissection, tracheostomy, reconstruction of anterior pharynx w/ L radial forearm flap and skin graft from left thigh to forarm donor site, thyroidectomy with reimplantation of parathyroid on 9/4/19. Patient was received alert, pleasant and cooperative this PM. Family members present at bedside. Soft larytube in place. Respiratory pattern appeared comfortable on room air. Patient is aphonic. Oroperipheral examination revealed adequate strength, range and coordination of oral articulators. Patient was provided with electrolarynx to facilitate functional communication. Patient demonstrated adequate placement of intra-oral tube as well as use of on/off function with minimal cues. Speech intelligibility was judged to be fair/- at the single word level and during automatic speech tasks. Improvement to fair intelligibility was elicted following mod-max verbal/visual cues to implement reduced rate of speech and over-articulation. Patient was encouraged to continue utilizing writing and/or gestures as primary form of communication at this time. A picture communication board was also provided to augment expressive communication if needed. Patient was encouraged to follow up with this service following discharge for ongoing training in use of electrolarynx and/or TEP. Good understanding demonstrated by patient/family at this time.

## 2019-09-09 NOTE — PROGRESS NOTE ADULT - SUBJECTIVE AND OBJECTIVE BOX
CHIEF COMPLAINT: Patient is a 74y old  male who presents with a chief complaint of shortness of breath (09 Sep 2019 08:02)      SUBJECTIVE / OVERNIGHT EVENTS:    Denies pain. Denies SOB. Denies cough. Denies N/V. Had BM today.    MEDICATIONS  (STANDING):  amLODIPine   Tablet 10 milliGRAM(s) Oral daily  aspirin  chewable 81 milliGRAM(s) Enteral Tube daily  calcitriol  Solution 0.5 MICROGram(s) Oral <User Schedule>  calcium carbonate   Suspension 2500 milliGRAM(s) Oral three times a day  clindamycin IVPB 600 milliGRAM(s) IV Intermittent every 8 hours  docusate sodium Liquid 100 milliGRAM(s) Oral daily  enoxaparin Injectable 40 milliGRAM(s) SubCutaneous daily  hydrochlorothiazide 25 milliGRAM(s) Oral daily  levothyroxine 137 MICROGram(s) Oral daily  senna Syrup 10 milliLiter(s) Oral at bedtime  simvastatin 20 milliGRAM(s) Oral at bedtime    MEDICATIONS  (PRN):  acetaminophen    Suspension .. 650 milliGRAM(s) Oral every 6 hours PRN Mild Pain (1 - 3)  oxyCODONE    IR 5 milliGRAM(s) Oral every 3 hours PRN Moderate Pain (4 - 6)  oxyCODONE    IR 10 milliGRAM(s) Oral every 3 hours PRN Severe Pain (7 - 10)      VITALS:  T(F): 99.1 (09-09-19 @ 09:51), Max: 100.1 (09-08-19 @ 14:45)  HR: 87 (09-09-19 @ 09:51) (78 - 95)  BP: 138/58 (09-09-19 @ 09:51) (127/61 - 142/51)  RR: 20 (09-09-19 @ 09:51) (16 - 20)  SpO2: 96% (09-09-19 @ 09:51)      CAPILLARY BLOOD GLUCOSE    Output     I&O's Summary  T(F): 99.1 (09-09-19 @ 09:51), Max: 100.1 (09-08-19 @ 14:45)  HR: 87 (09-09-19 @ 09:51) (78 - 95)  BP: 138/58 (09-09-19 @ 09:51) (127/61 - 142/51)  RR: 20 (09-09-19 @ 09:51) (16 - 20)  SpO2: 96% (09-09-19 @ 09:51)    PHYSICAL EXAM:  GENERAL: NAD, well-developed  HEAD:  Atraumatic, Normocephalic  EYES: EOMI  NECK: Supple, No JVD  CHEST/LUNG: nonlabored breathing  HEART: nl S1/S2  ABDOMEN: nondistended, soft  EXTREMITIES:  no LE edema  PSYCH: alert, answering questions appropriately  NEUROLOGY: non-focal  SKIN: No rashes noted    LABS:              x                    137  | 32   | 16           x     >-----------< x       ------------------------< 119                   x                    3.7  | 95   | 0.91                                         Ca 7.9   Mg x     Ph x           TPro  5.9  /  Alb  2.9      TBili  0.3  /  DBili  x         AST  15  /  ALT  8             AlkPhos  51                    MICROBIOLOGY:        RADIOLOGY & ADDITIONAL TESTS:    Imaging Personally Reviewed:    < from: Xray Chest 1 View- PORTABLE-Urgent (09.06.19 @ 00:49) >      < end of copied text >        [x] Care Discussed with Consultants/Other Providers:      Ruel Man M.D.  Hospitalist  Pager [phone]

## 2019-09-09 NOTE — CONSULT NOTE ADULT - SUBJECTIVE AND OBJECTIVE BOX
Patient is a 74y old  Male who presents with a chief complaint of shortness of breath (09 Sep 2019 14:46)      HPI:  Patient is a 74 year old male with known T4 larynx cancer with extralaryngeal spread, HTN, HLD who presents to the ED with shortness of breath. He reports that he was sleeping this morning when he woke up around 5am with acute shortness of breath. His respiratory status is worse than last week but currently denies difficulty breathing when sitting in bed. He currently says he "feels ok". His son reports that he does become short of breath when he walks around. Reports he felt like some mucus was stuck in his throat and he had difficulty coughing it up. He reports that he otherwise has been eating food and drinking liquids ok. Denies pain. (04 Sep 2019 11:37)    Patient underwent total laryngectomy with free flap on 9/5/19  Patient reports he was living independently prior to admission, lives alone at home.  He reports after discharge he plans to go stay with his daughter who lives in a house with 4 stairs outside, and a flight of stairs inside.  Patient denies pain or nausea.  He reports he had some constipation but took stool softener and has had 3 BMs since.  Patient ambulated with PT today 150' supervision.  Patient had episode of orthostatic hypotension while ambulating yesterday. He denies dizziness or lightheadedness currently.    REVIEW OF SYSTEMS: No chest pain, shortness of breath, nausea, vomiting or diarrhea.      PAST MEDICAL & SURGICAL HISTORY  Laryngeal cancer  Hyperlipidemia, mild  HTN (hypertension)  Infectious hepatitis  No significant past surgical history      FUNCTIONAL HISTORY:   Lives at home alone  Independent at baseline    CURRENT FUNCTIONAL STATUS:  BM: supervision  T: supervision  gait 150' supervision    FAMILY HISTORY   Family history of gynecological problem  FH: colon cancer  FH: lung cancer      RECENT LABS/IMAGING  CBC Full  -  ( 08 Sep 2019 06:35 )  WBC Count : 8.80 K/uL  RBC Count : 3.48 M/uL  Hemoglobin : 10.0 g/dL  Hematocrit : 31.8 %  Platelet Count - Automated : 204 K/uL  Mean Cell Volume : 91.4 fL  Mean Cell Hemoglobin : 28.7 pg  Mean Cell Hemoglobin Concentration : 31.4 %  Auto Neutrophil # : x  Auto Lymphocyte # : x  Auto Monocyte # : x  Auto Eosinophil # : x  Auto Basophil # : x  Auto Neutrophil % : x  Auto Lymphocyte % : x  Auto Monocyte % : x  Auto Eosinophil % : x  Auto Basophil % : x    09-09    137  |  95<L>  |  16  ----------------------------<  119<H>  3.7   |  32<H>  |  0.91    Ca    7.9<L>      09 Sep 2019 07:05  Phos  3.7     09-08  Mg     1.9     09-08    TPro  5.9<L>  /  Alb  2.9<L>  /  TBili  0.3  /  DBili  x   /  AST  15  /  ALT  8   /  AlkPhos  51  09-07        VITALS  T(C): 37 (09-09-19 @ 14:00), Max: 37.3 (09-09-19 @ 09:51)  HR: 91 (09-09-19 @ 14:00) (78 - 91)  BP: 126/57 (09-09-19 @ 14:00) (126/57 - 139/61)  RR: 16 (09-09-19 @ 14:00) (16 - 20)  SpO2: 98% (09-09-19 @ 14:00) (93% - 98%)  Wt(kg): --    ALLERGIES  penicillin (Rash)      MEDICATIONS   acetaminophen    Suspension .. 650 milliGRAM(s) Oral every 6 hours PRN  amLODIPine   Tablet 10 milliGRAM(s) Oral daily  aspirin  chewable 81 milliGRAM(s) Enteral Tube daily  calcitriol  Solution 0.5 MICROGram(s) Oral <User Schedule>  calcium carbonate   Suspension 2500 milliGRAM(s) Oral three times a day  clindamycin IVPB 600 milliGRAM(s) IV Intermittent every 8 hours  docusate sodium Liquid 100 milliGRAM(s) Oral daily  enoxaparin Injectable 40 milliGRAM(s) SubCutaneous daily  hydrochlorothiazide 25 milliGRAM(s) Oral daily  levothyroxine 137 MICROGram(s) Oral daily  oxyCODONE    IR 5 milliGRAM(s) Oral every 3 hours PRN  oxyCODONE    IR 10 milliGRAM(s) Oral every 3 hours PRN  senna Syrup 10 milliLiter(s) Oral at bedtime  simvastatin 20 milliGRAM(s) Oral at bedtime      ----------------------------------------------------------------------------------------  PHYSICAL EXAM  Constitutional - NAD, Comfortable  HEENT -+ staples in place around base of neck, + NG tube  Chest - CTA bilaterally  Cardiovascular - RRR, S1S2, No murmurs  Abdomen - BS+, Soft, NTND  Extremities + edema b/l LE, No calf tenderness , dressings with drain in place LUE  Neurologic Exam -                    Cognitive - Awake, Alert, AAO to self, place, date, year, situation     Communication - s/p laryngectomy, communicates by writing     Cranial Nerves - CN 2-12 intact     Motor - No focal deficits          Sensory - Intact to LT     Reflexes - DTR Intact, No primitive reflexive     Balance - WNL Static  Psychiatric - Mood stable, Affect WNL      Impression:    73 yo M pmh laryngeal ca, now s/p laryngectomy      Plan:  PT/OT- ROM, Bed Mob, Transfers, Amb   SLP- Dysphagia eval and treat- swallow evaluation pending, NG tube in place  patient to have ongoing training for electrolarynx   DVT Prophylaxis- lovenox  Skin- Turn q2 h  Dispo- home when medically cleared. Patient plans to stay with his daughter after discharge Patient is a 74y old  Male who presents with a chief complaint of shortness of breath (09 Sep 2019 14:46)      HPI:  Patient is a 74 year old male with known T4 larynx cancer with extralaryngeal spread, HTN, HLD who presents to the ED with shortness of breath. He reports that he was sleeping this morning when he woke up around 5am with acute shortness of breath. His respiratory status is worse than last week but currently denies difficulty breathing when sitting in bed. He currently says he "feels ok". His son reports that he does become short of breath when he walks around. Reports he felt like some mucus was stuck in his throat and he had difficulty coughing it up. He reports that he otherwise has been eating food and drinking liquids ok. Denies pain. (04 Sep 2019 11:37)    Patient underwent total laryngectomy with free flap on 9/5/19  Patient reports he was living independently prior to admission, lives alone at home.  He reports after discharge he plans to go stay with his daughter who lives in a house with 4 stairs outside, and a flight of stairs inside.  Patient denies pain or nausea.  He reports he had some constipation but took stool softener and has had 3 BMs since.  Patient ambulated with PT today 150' supervision.  Patient had episode of orthostatic hypotension while ambulating yesterday. He denies dizziness or lightheadedness currently.    REVIEW OF SYSTEMS: No chest pain, shortness of breath, nausea, vomiting or diarrhea.      PAST MEDICAL & SURGICAL HISTORY  Laryngeal cancer  Hyperlipidemia, mild  HTN (hypertension)  Infectious hepatitis  No significant past surgical history    Social: nonsmoker, social etoh    FUNCTIONAL HISTORY:   Lives at home alone  Independent at baseline    CURRENT FUNCTIONAL STATUS:  BM: supervision  T: supervision  gait 150' supervision    FAMILY HISTORY   Family history of gynecological problem  FH: colon cancer  FH: lung cancer      RECENT LABS/IMAGING  CBC Full  -  ( 08 Sep 2019 06:35 )  WBC Count : 8.80 K/uL  RBC Count : 3.48 M/uL  Hemoglobin : 10.0 g/dL  Hematocrit : 31.8 %  Platelet Count - Automated : 204 K/uL  Mean Cell Volume : 91.4 fL  Mean Cell Hemoglobin : 28.7 pg  Mean Cell Hemoglobin Concentration : 31.4 %  Auto Neutrophil # : x  Auto Lymphocyte # : x  Auto Monocyte # : x  Auto Eosinophil # : x  Auto Basophil # : x  Auto Neutrophil % : x  Auto Lymphocyte % : x  Auto Monocyte % : x  Auto Eosinophil % : x  Auto Basophil % : x    09-09    137  |  95<L>  |  16  ----------------------------<  119<H>  3.7   |  32<H>  |  0.91    Ca    7.9<L>      09 Sep 2019 07:05  Phos  3.7     09-08  Mg     1.9     09-08    TPro  5.9<L>  /  Alb  2.9<L>  /  TBili  0.3  /  DBili  x   /  AST  15  /  ALT  8   /  AlkPhos  51  09-07        VITALS  T(C): 37 (09-09-19 @ 14:00), Max: 37.3 (09-09-19 @ 09:51)  HR: 91 (09-09-19 @ 14:00) (78 - 91)  BP: 126/57 (09-09-19 @ 14:00) (126/57 - 139/61)  RR: 16 (09-09-19 @ 14:00) (16 - 20)  SpO2: 98% (09-09-19 @ 14:00) (93% - 98%)  Wt(kg): --    ALLERGIES  penicillin (Rash)      MEDICATIONS   acetaminophen    Suspension .. 650 milliGRAM(s) Oral every 6 hours PRN  amLODIPine   Tablet 10 milliGRAM(s) Oral daily  aspirin  chewable 81 milliGRAM(s) Enteral Tube daily  calcitriol  Solution 0.5 MICROGram(s) Oral <User Schedule>  calcium carbonate   Suspension 2500 milliGRAM(s) Oral three times a day  clindamycin IVPB 600 milliGRAM(s) IV Intermittent every 8 hours  docusate sodium Liquid 100 milliGRAM(s) Oral daily  enoxaparin Injectable 40 milliGRAM(s) SubCutaneous daily  hydrochlorothiazide 25 milliGRAM(s) Oral daily  levothyroxine 137 MICROGram(s) Oral daily  oxyCODONE    IR 5 milliGRAM(s) Oral every 3 hours PRN  oxyCODONE    IR 10 milliGRAM(s) Oral every 3 hours PRN  senna Syrup 10 milliLiter(s) Oral at bedtime  simvastatin 20 milliGRAM(s) Oral at bedtime      ----------------------------------------------------------------------------------------  PHYSICAL EXAM  Constitutional - NAD, Comfortable  HEENT -+ staples in place around base of neck, + NG tube  Chest - CTA bilaterally  Cardiovascular - RRR, S1S2, No murmurs  Abdomen - BS+, Soft, NTND  Extremities + edema b/l LE, No calf tenderness , dressings with drain in place LUE  Neurologic Exam -                    Cognitive - Awake, Alert, AAO to self, place, date, year, situation     Communication - s/p laryngectomy, communicates by writing     Cranial Nerves - CN 2-12 intact     Motor - No focal deficits          Sensory - Intact to LT     Reflexes - DTR Intact, No primitive reflexive     Balance - WNL Static  Psychiatric - Mood stable, Affect WNL      Impression:    75 yo M pmh laryngeal ca, now s/p laryngectomy      Plan:  PT/OT- ROM, Bed Mob, Transfers, Amb   SLP- Dysphagia eval and treat- swallow evaluation pending, NG tube in place  patient to have ongoing training for electrolarynx   DVT Prophylaxis- lovenox  Skin- Turn q2 h  Dispo- home when medically cleared. Patient plans to stay with his daughter after discharge

## 2019-09-09 NOTE — DISCHARGE NOTE PROVIDER - NSDCFUSCHEDAPPT_GEN_ALL_CORE_FT
MAYELA LIANG ; 09/10/2019 ; NP[address] MAYELA LIANG ; 09/27/2019 ; [facility] [address]  MAYELA LIANG ; 09/27/2019 ; [facility] [address]  MAYELA LIANG ; 09/27/2019 ; [facility] [address]  MAYELA LIANG ; 09/27/2019 ; [facility] [address]  MAYELA LIANG ; 09/27/2019 ; [facility] [address]  MAYELA LIANG ; 09/27/2019 ; \[facility]\"" End[facility] [address]

## 2019-09-09 NOTE — CONSULT NOTE ADULT - CONSULT REASON
Laryngectomy with new tracheostomy, Q1H flap checks
Post op hypothyroidism and hypoparathyroid
evaluate for rehab needs
pre-operative evaluation
sob
laryngeal SCC

## 2019-09-09 NOTE — CONSULT NOTE ADULT - REASON FOR ADMISSION
sob
shortness of breath

## 2019-09-09 NOTE — PROGRESS NOTE ADULT - PROBLEM SELECTOR PLAN 1
s/p partial parathyroidectomy and total thyroidectomy along with laryngectomy for laryngeal cancer, now with post op hypoparathyroidism.   - Intact PTH level 2.73 with corrected calcium now at 8.8.   - Continue with calcium supplementation with oral calcium carbonate suspension 2500 mg TID and Calcitriol solution 0.5 mg BID via NG tube  - Monitor serum calcium qday    - Patient can follow with [facility] End[facility], prefers near [facility].   Dr. Michelle Dominguez 9/27 8:30AM   [address] [place] [address]   [phone]

## 2019-09-09 NOTE — DISCHARGE NOTE PROVIDER - NSDCFUADDINST_GEN_ALL_CORE_FT
laryngectomy: clean tube daily. Suction as needed. Humidified air to laryngectomy collar. laryngectomy: 10 HME. clean tube daily. Suction as needed. Humidified air to laryngectomy collar at night.     Wound care left forearm:  Xeroform followed by abd then ACE wrap.  change every 48 hours    Leave doppler wire in place and covered with tagaderm. Will be removed in office. laryngectomy: 10 HME. clean tube daily. Suction as needed. Humidified air to laryngectomy collar at night.     Wound care left forearm:  Xeroform followed by abd then ACE wrap.  change every 48 hours    Leave white doppler wire covered with tagaderm. Dr. Moncada will remove it in the office when you follow up with Dr. Johnston    Leave doppler wire in place and covered with tagaderm. Will be removed in office. laryngectomy: 10 HME. clean tube daily. Suction as needed. Humidified air to laryngectomy collar at night.     Wound care left forearm:  aquafor bid and leave open to air.     Leave white doppler wire covered with tagaderm. Dr. Moncada will remove it in the office when you follow up with Dr. Johnston    Leave doppler wire in place and covered with tagaderm. Will be removed in office.

## 2019-09-09 NOTE — PROGRESS NOTE ADULT - SUBJECTIVE AND OBJECTIVE BOX
Plastic Surgery    SUBJECTIVE:   [id number] while ambulating this weekend, otherwise well    VITALS  T(C): 36.9 (09-09-19 @ 06:30), Max: 37.8 (09-08-19 @ 14:45)  HR: 78 (09-09-19 @ 06:30) (78 - 95)  BP: 139/61 (09-09-19 @ 06:30) (127/61 - 142/51)  RR: 20 (09-09-19 @ 06:30) (16 - 20)  SpO2: 96% (09-09-19 @ 06:30) (93% - 96%)  CAPILLARY BLOOD GLUCOSE      POCT Blood Glucose.: 113 mg/dL (08 Sep 2019 11:54)      Is/Os    09-08 @ 07:01  -  09-09 @ 07:00  --------------------------------------------------------  IN:    Enteral Tube Flush: 650 mL    IV PiggyBack: 100 mL    ns in tub fed  [id number]: 1080 mL  Total IN: 1830 mL    OUT:    Bulb: 12.5 mL    Bulb: 10 mL    Bulb: 12.5 mL    Voided: 700 mL  Total OUT: 735 mL    Total NET: 1095 mL          PHYSICAL EXAM:   General: NAD, Lying in bed comfortably  HEENT: Strong cook signal, neck soft no erythema  Extrem vac in place holding suction, stsg donor site dressing intact    MEDICATIONS (STANDING): amLODIPine   Tablet 10 milliGRAM(s) Oral daily  aspirin  chewable 81 milliGRAM(s) Enteral Tube daily  calcitriol  Solution 0.5 MICROGram(s) Oral <User Schedule>  calcium carbonate   Suspension 2500 milliGRAM(s) Oral three times a day  clindamycin IVPB 600 milliGRAM(s) IV Intermittent every 8 hours  docusate sodium Liquid 100 milliGRAM(s) Oral daily  enoxaparin Injectable 40 milliGRAM(s) SubCutaneous daily  hydrochlorothiazide 25 milliGRAM(s) Oral daily  levothyroxine 137 MICROGram(s) Oral daily  senna Syrup 10 milliLiter(s) Oral at bedtime  simvastatin 20 milliGRAM(s) Oral at bedtime    MEDICATIONS (PRN):acetaminophen    Suspension .. 650 milliGRAM(s) Oral every 6 hours PRN Mild Pain (1 - 3)  oxyCODONE    IR 5 milliGRAM(s) Oral every 3 hours PRN Moderate Pain (4 - 6)  oxyCODONE    IR 10 milliGRAM(s) Oral every 3 hours PRN Severe Pain (7 - 10)      LABS  CBC (09-08 @ 06:35)                              10.0<L>                         8.80    )----------------(  204        --    % Neutrophils, --    % Lymphocytes, ANC: --                                  31.8<L>    BMP (09-08 @ 06:35)             140     |  97<L>   |  16    		Ca++ --      Ca 7.8<L>             ---------------------------------( 181<H>		Mg 1.9                3.6     |  34<H>   |  0.90  			Ph 3.7     BMP (09-07 @ 21:40)             139     |  99      |  16    		Ca++ --      Ca 7.9<L>             ---------------------------------( 150<H>		Mg 1.9                3.9     |  33<H>   |  0.93  			Ph 3.6       LFTs (09-07 @ 21:40)      TPro 5.9<L> / Alb 2.9<L> / TBili 0.3 / DBili -- / AST 15 / ALT 8 / AlkPhos 51              IMAGING STUDIES

## 2019-09-09 NOTE — DIETITIAN INITIAL EVALUATION ADULT. - PERTINENT MEDS FT
MEDICATIONS  (STANDING):  amLODIPine   Tablet 10 milliGRAM(s) Oral daily  aspirin  chewable 81 milliGRAM(s) Enteral Tube daily  calcitriol  Solution 0.5 MICROGram(s) Oral <User Schedule>  calcium carbonate   Suspension 2500 milliGRAM(s) Oral three times a day  clindamycin IVPB 600 milliGRAM(s) IV Intermittent every 8 hours  docusate sodium Liquid 100 milliGRAM(s) Oral daily  enoxaparin Injectable 40 milliGRAM(s) SubCutaneous daily  hydrochlorothiazide 25 milliGRAM(s) Oral daily  levothyroxine 137 MICROGram(s) Oral daily  senna Syrup 10 milliLiter(s) Oral at bedtime  simvastatin 20 milliGRAM(s) Oral at bedtime    MEDICATIONS  (PRN):  acetaminophen    Suspension .. 650 milliGRAM(s) Oral every 6 hours PRN Mild Pain (1 - 3)  oxyCODONE    IR 5 milliGRAM(s) Oral every 3 hours PRN Moderate Pain (4 - 6)  oxyCODONE    IR 10 milliGRAM(s) Oral every 3 hours PRN Severe Pain (7 - 10)

## 2019-09-09 NOTE — DISCHARGE NOTE PROVIDER - HOSPITAL COURSE
Hx of laryngeal cancer, S/P laryngectomy... Hx of laryngeal cancer, S/P laryngectomy. taught laryngectomy care. Esophagram leak study was done and leak was ruled out. Patient started on clear diet and then    Discharged home in [facility].

## 2019-09-10 ENCOUNTER — APPOINTMENT (OUTPATIENT)
Dept: PLASTIC SURGERY | Facility: CLINIC | Age: 74
End: 2019-09-10

## 2019-09-10 LAB
ANION GAP SERPL CALC-SCNC: 12 MMO/L — SIGNIFICANT CHANGE UP (ref 7–14)
BUN SERPL-MCNC: 15 MG/DL — SIGNIFICANT CHANGE UP (ref 7–23)
CALCIUM SERPL-MCNC: 7.8 MG/DL — LOW (ref 8.4–10.5)
CHLORIDE SERPL-SCNC: 94 MMOL/L — LOW (ref 98–107)
CO2 SERPL-SCNC: 32 MMOL/L — HIGH (ref 22–31)
CREAT SERPL-MCNC: 0.9 MG/DL — SIGNIFICANT CHANGE UP (ref 0.5–1.3)
GLUCOSE SERPL-MCNC: 150 MG/DL — HIGH (ref 70–99)
HCT VFR BLD CALC: 31.2 % — LOW (ref 39–50)
HGB BLD-MCNC: 10 G/DL — LOW (ref 13–17)
MAGNESIUM SERPL-MCNC: 1.8 MG/DL — SIGNIFICANT CHANGE UP (ref 1.6–2.6)
MCHC RBC-ENTMCNC: 28.7 PG — SIGNIFICANT CHANGE UP (ref 27–34)
MCHC RBC-ENTMCNC: 32.1 % — SIGNIFICANT CHANGE UP (ref 32–36)
MCV RBC AUTO: 89.4 FL — SIGNIFICANT CHANGE UP (ref 80–100)
NRBC # FLD: 0.03 K/UL — SIGNIFICANT CHANGE UP (ref 0–0)
PHOSPHATE SERPL-MCNC: 4.9 MG/DL — HIGH (ref 2.5–4.5)
PLATELET # BLD AUTO: 268 K/UL — SIGNIFICANT CHANGE UP (ref 150–400)
PMV BLD: 9.9 FL — SIGNIFICANT CHANGE UP (ref 7–13)
POTASSIUM SERPL-MCNC: 3.7 MMOL/L — SIGNIFICANT CHANGE UP (ref 3.5–5.3)
POTASSIUM SERPL-SCNC: 3.7 MMOL/L — SIGNIFICANT CHANGE UP (ref 3.5–5.3)
RBC # BLD: 3.49 M/UL — LOW (ref 4.2–5.8)
RBC # FLD: 13 % — SIGNIFICANT CHANGE UP (ref 10.3–14.5)
SODIUM SERPL-SCNC: 138 MMOL/L — SIGNIFICANT CHANGE UP (ref 135–145)
WBC # BLD: 9.86 K/UL — SIGNIFICANT CHANGE UP (ref 3.8–10.5)
WBC # FLD AUTO: 9.86 K/UL — SIGNIFICANT CHANGE UP (ref 3.8–10.5)

## 2019-09-10 PROCEDURE — 99233 SBSQ HOSP IP/OBS HIGH 50: CPT | Mod: GC

## 2019-09-10 PROCEDURE — 99232 SBSQ HOSP IP/OBS MODERATE 35: CPT

## 2019-09-10 RX ORDER — CALCITRIOL 0.5 UG/1
0.5 CAPSULE ORAL
Refills: 0 | Status: DISCONTINUED | OUTPATIENT
Start: 2019-09-10 | End: 2019-09-12

## 2019-09-10 RX ADMIN — Medication 137 MICROGRAM(S): at 05:54

## 2019-09-10 RX ADMIN — ENOXAPARIN SODIUM 40 MILLIGRAM(S): 100 INJECTION SUBCUTANEOUS at 12:29

## 2019-09-10 RX ADMIN — SIMVASTATIN 20 MILLIGRAM(S): 20 TABLET, FILM COATED ORAL at 22:32

## 2019-09-10 RX ADMIN — Medication 2500 MILLIGRAM(S): at 22:32

## 2019-09-10 RX ADMIN — Medication 100 MILLIGRAM(S): at 13:38

## 2019-09-10 RX ADMIN — Medication 100 MILLIGRAM(S): at 07:12

## 2019-09-10 RX ADMIN — Medication 2500 MILLIGRAM(S): at 05:54

## 2019-09-10 RX ADMIN — AMLODIPINE BESYLATE 10 MILLIGRAM(S): 2.5 TABLET ORAL at 05:55

## 2019-09-10 RX ADMIN — CALCITRIOL 0.5 MICROGRAM(S): 0.5 CAPSULE ORAL at 05:54

## 2019-09-10 RX ADMIN — Medication 25 MILLIGRAM(S): at 05:55

## 2019-09-10 RX ADMIN — Medication 2500 MILLIGRAM(S): at 15:12

## 2019-09-10 RX ADMIN — Medication 81 MILLIGRAM(S): at 12:29

## 2019-09-10 NOTE — PROGRESS NOTE ADULT - PROBLEM SELECTOR PLAN 2
s/p total thyroidectomy along with laryngectomy and partial parathyroidectomy for laryngeal cancer.   TSH -3.5, TT4 5.89, TT3 68. Patient is clinically euthyroid.  -repeat FT4 in a week on 9/12 and TSH in 4 weeks.     - c/w LT4 137 mcg NGT   - when giving bolus feeds, please give it at least 30 minutes after Levothyroxine     f/u Dr. Martinez appt above

## 2019-09-10 NOTE — PROGRESS NOTE ADULT - SUBJECTIVE AND OBJECTIVE BOX
Patient seen and examined at the bedside. No acute events overnight. Tolerating tube feeds, neck IDA d/c'd x2 yesterday.       NAD, awake and alert  Breathing comfortably on humidified TC   OC/OP: wnl  Neck: soft, flat, bilateral IDA with SS drainage. Incision c/d/i   LT in place, humidified trach collar  TiqIQ doppler signal strong  L arm with ACE wrap, wound vac in place and IDA with scant drainage     A/P: 74M s/p TL, TT, bilateral SND, L RFFF, L STSG 9/4   - LT  -IS/CPT  -bolus tube feeds   - Plan for Esophagram Friday  - SLP for HME  -IDA to bulb suction  -[employer] doppler  -OOBTC   -PT/OT  -PM&R   -asa/lov  -clinda  -synthroid  -calcium repletions IV PRN  -max oral repletions

## 2019-09-10 NOTE — PROGRESS NOTE ADULT - SUBJECTIVE AND OBJECTIVE BOX
CHIEF COMPLAINT: Patient is a 74y old  male who presents with a chief complaint of shortness of breath (10 Sep 2019 10:11)      SUBJECTIVE / OVERNIGHT EVENTS:    No acute events. IDA drains removed.    MEDICATIONS  (STANDING):  amLODIPine   Tablet 10 milliGRAM(s) Oral daily  aspirin  chewable 81 milliGRAM(s) Enteral Tube daily  calcitriol  Solution 0.5 MICROGram(s) Oral <User Schedule>  calcium carbonate   Suspension 2500 milliGRAM(s) Oral three times a day  clindamycin IVPB 600 milliGRAM(s) IV Intermittent every 8 hours  docusate sodium Liquid 100 milliGRAM(s) Oral daily  enoxaparin Injectable 40 milliGRAM(s) SubCutaneous daily  hydrochlorothiazide 25 milliGRAM(s) Oral daily  levothyroxine 137 MICROGram(s) Oral daily  senna Syrup 10 milliLiter(s) Oral at bedtime  simvastatin 20 milliGRAM(s) Oral at bedtime    MEDICATIONS  (PRN):  acetaminophen    Suspension .. 650 milliGRAM(s) Oral every 6 hours PRN Mild Pain (1 - 3)  oxyCODONE    IR 5 milliGRAM(s) Oral every 3 hours PRN Moderate Pain (4 - 6)  oxyCODONE    IR 10 milliGRAM(s) Oral every 3 hours PRN Severe Pain (7 - 10)      VITALS:  T(F): 98.4 (09-10-19 @ 13:56), Max: 98.9 (09-10-19 @ 02:09)  HR: 85 (09-10-19 @ 13:56) (85 - 91)  BP: 132/76 (09-10-19 @ 13:56) (128/62 - 147/60)  RR: 16 (09-10-19 @ 13:56) (16 - 20)  SpO2: 95% (09-10-19 @ 13:56)      CAPILLARY BLOOD GLUCOSE    Output     I&O's Summary  T(F): 98.4 (09-10-19 @ 13:56), Max: 98.9 (09-10-19 @ 02:09)  HR: 85 (09-10-19 @ 13:56) (85 - 91)  BP: 132/76 (09-10-19 @ 13:56) (128/62 - 147/60)  RR: 16 (09-10-19 @ 13:56) (16 - 20)  SpO2: 95% (09-10-19 @ 13:56)    PHYSICAL EXAM:  GENERAL: NAD, well-developed  HEAD:  Atraumatic, Normocephalic  EYES: EOMI  CHEST/LUNG: nonlabored breathing  HEART: nl S1/S2  ABDOMEN: nondistended, soft  EXTREMITIES:  no LE edema  PSYCH: alert, answering questions appropriately  NEUROLOGY: non-focal  SKIN: No rashes noted    LABS:              10.0                 138  | 32   | 15           9.86  >-----------< 268     ------------------------< 150                   31.2                 3.7  | 94   | 0.90                                         Ca 7.8   Mg 1.8   Ph 4.9                        MICROBIOLOGY:        RADIOLOGY & ADDITIONAL TESTS:    Imaging Personally Reviewed:    < from: Xray Chest 1 View- PORTABLE-Urgent (09.06.19 @ 00:49) >      < end of copied text >        [x] Care Discussed with Consultants/Other Providers:      Ruel Man M.D.  Hospitalist  Pager [phone]

## 2019-09-10 NOTE — PROGRESS NOTE ADULT - ASSESSMENT
75 y/o man with HTN, HLD, carotid stenosis laryngeal ca with extralaryngeal spread who presented with SOB now s/p total laryngectomy, b/l neck dissection, total thyroidectomy, with LRFF and STSG for reconstruction.    #Laryngeal ca: s/p total laryngectomy, b/l neck dissection, total thyroidectomy, with LRFF and STSG for reconstruction on 9/5/19.   - Care per ENT/plastic surgery, appreciate input  - pain currently well controlled    #Hypocalcemia:  - continue calcitriol, calcium carbonate  - monitor Ca level    #HTN: continue home medication regimen  - HCTZ 25 mg daily  - amlodipine 10 mg daily  - BP acceptable    #HLD: continue statin    #Carotid stenosis: continue aspirin    #Hypothyroidism: Continue synthroid    #DVT ppx: Lovenox subq

## 2019-09-10 NOTE — PROGRESS NOTE ADULT - ASSESSMENT
74M POD3 s/p total laryngectomy, b/l neck dissection, total thyroidectomy, with LRFF and STSG for reconstruction.    -cont q4h flap check   -encourage oob/ambo  -care per primary team 74M POD5 s/p total laryngectomy, b/l neck dissection, total thyroidectomy, with LRFF and STSG for reconstruction.    -cont q4h flap check   -encourage oob/ambo  -care per primary team

## 2019-09-10 NOTE — PROGRESS NOTE ADULT - PROBLEM SELECTOR PLAN 1
s/p partial parathyroidectomy and total thyroidectomy along with laryngectomy for laryngeal cancer, now with post op hypoparathyroidism.   - Intact PTH level 2.73 with corrected calcium now at 8.7.   - Continue with calcium supplementation with oral calcium carbonate suspension 2500 mg TID and Calcitriol solution 0.5 mg BID via NG tube  - Monitor serum calcium qday    - Patient can follow with [facility] Endocrinology, prefers near [facility].   Dr. Michelle Dominguez 9/27 8:30AM   [address] [place] [address]   [phone] s/p partial parathyroidectomy and total thyroidectomy along with laryngectomy for laryngeal cancer, now with post op hypoparathyroidism.   - Intact PTH level 2.73 with corrected calcium now at 8.7.   - Continue with calcium supplementation with oral calcium carbonate suspension 2500 mg TID  - change Calcitriol solution 0.5 mg to qday via NG tube given hyperphosphatemia   - Monitor serum calcium, phos qday   - Patient can follow with [facility] Endocrinology, prefers near [facility]   Dr. Michelle Dominguez 9/27 8:30AM   [address] [place] [id number] [phone]

## 2019-09-10 NOTE — PROGRESS NOTE ADULT - SUBJECTIVE AND OBJECTIVE BOX
Chief Complaint: hypothyroidism, hypocalcemia     History: No complaints, feels well.     MEDICATIONS  (STANDING):  amLODIPine   Tablet 10 milliGRAM(s) Oral daily  aspirin  chewable 81 milliGRAM(s) Enteral Tube daily  calcitriol  Solution 0.5 MICROGram(s) Oral <User Schedule>  calcium carbonate   Suspension 2500 milliGRAM(s) Oral three times a day  clindamycin IVPB 600 milliGRAM(s) IV Intermittent every 8 hours  docusate sodium Liquid 100 milliGRAM(s) Oral daily  enoxaparin Injectable 40 milliGRAM(s) SubCutaneous daily  hydrochlorothiazide 25 milliGRAM(s) Oral daily  levothyroxine 137 MICROGram(s) Oral daily  senna Syrup 10 milliLiter(s) Oral at bedtime  simvastatin 20 milliGRAM(s) Oral at bedtime    MEDICATIONS  (PRN):  acetaminophen    Suspension .. 650 milliGRAM(s) Oral every 6 hours PRN Mild Pain (1 - 3)  oxyCODONE    IR 5 milliGRAM(s) Oral every 3 hours PRN Moderate Pain (4 - 6)  oxyCODONE    IR 10 milliGRAM(s) Oral every 3 hours PRN Severe Pain (7 - 10)      Allergies    penicillin (Rash)    Intolerances        PHYSICAL EXAM:  VITALS: T(C): 36.9 (09-10-19 @ 13:56)  T(F): 98.4 (09-10-19 @ 13:56), Max: 98.9 (09-10-19 @ 02:09)  HR: 85 (09-10-19 @ 13:56) (85 - 91)  BP: 132/76 (09-10-19 @ 13:56) (128/62 - 147/60)  RR:  (16 - 20)  SpO2:  (94% - 98%)  Wt(kg): --  GENERAL: NAD, well-groomed, well-developed  EYES: No proptosis,  anicteric  RESPIRATORY: Clear to auscultation bilaterally; No rales, rhonchi, wheezing, or rubs  CARDIOVASCULAR: Regular rate and rhythm; No murmurs  NEURO: AOx3, moves all extremities spontaenuously   PSYCH:  reactive affect, euthymic mood      POCT Blood Glucose.: 113 mg/dL (09-08-19 @ 11:54)      09-10    138  |  94<L>  |  15  ----------------------------<  150<H>  3.7   |  32<H>  |  0.90    EGFR if : 98  EGFR if non : 84    Ca    7.8<L>      09-10  Mg     1.8     09-10  Phos  4.9     09-10    TPro  5.9<L>  /  Alb  2.9<L>  /  TBili  0.3  /  DBili  x   /  AST  15  /  ALT  8   /  AlkPhos  51  09-07          Thyroid Function Tests:  09-08 @ 06:35 TSH 3.58 FreeT4 -- T3 68.4 Anti TPO -- Anti Thyroglobulin Ab -- TSI --

## 2019-09-10 NOTE — PROGRESS NOTE ADULT - SUBJECTIVE AND OBJECTIVE BOX
Plastic Surgery    SUBJECTIVE:   seen and examined doing well.    T(C): 36.7 (09-10-19 @ 05:53), Max: 37.2 (09-10-19 @ 02:09)  HR: 86 (09-10-19 @ 05:53) (86 - 91)  BP: 139/71 (09-10-19 @ 05:53) (126/57 - 147/60)  RR: 19 (09-10-19 @ 05:53) (16 - 20)  SpO2: 96% (09-10-19 @ 05:53) (95% - 98%)  Wt(kg): --  09-09 @ 07:01  -  09-10 @ 07:00  --------------------------------------------------------  IN:    Enteral Tube Flush: 300 mL    IV PiggyBack: 100 mL    ns in tub fed  rogxim28: 800 mL  Total IN: 1200 mL    OUT:    Bulb: 5 mL    Voided: 1150 mL  Total OUT: 1155 mL    Total NET: 45 mL          PHYSICAL EXAM:   General: NAD, Lying in bed comfortably  HEENT: Strong cook signal, neck soft no erythema  Extrem vac in place holding suction, stsg donor site dressing intact

## 2019-09-11 LAB
ANION GAP SERPL CALC-SCNC: 13 MMO/L — SIGNIFICANT CHANGE UP (ref 7–14)
BUN SERPL-MCNC: 14 MG/DL — SIGNIFICANT CHANGE UP (ref 7–23)
CALCIUM SERPL-MCNC: 7.9 MG/DL — LOW (ref 8.4–10.5)
CHLORIDE SERPL-SCNC: 95 MMOL/L — LOW (ref 98–107)
CO2 SERPL-SCNC: 31 MMOL/L — SIGNIFICANT CHANGE UP (ref 22–31)
CREAT SERPL-MCNC: 0.92 MG/DL — SIGNIFICANT CHANGE UP (ref 0.5–1.3)
GLUCOSE SERPL-MCNC: 111 MG/DL — HIGH (ref 70–99)
HCT VFR BLD CALC: 30.3 % — LOW (ref 39–50)
HGB BLD-MCNC: 9.8 G/DL — LOW (ref 13–17)
MAGNESIUM SERPL-MCNC: 2 MG/DL — SIGNIFICANT CHANGE UP (ref 1.6–2.6)
MCHC RBC-ENTMCNC: 29.2 PG — SIGNIFICANT CHANGE UP (ref 27–34)
MCHC RBC-ENTMCNC: 32.3 % — SIGNIFICANT CHANGE UP (ref 32–36)
MCV RBC AUTO: 90.2 FL — SIGNIFICANT CHANGE UP (ref 80–100)
NRBC # FLD: 0.03 K/UL — SIGNIFICANT CHANGE UP (ref 0–0)
PHOSPHATE SERPL-MCNC: 4.1 MG/DL — SIGNIFICANT CHANGE UP (ref 2.5–4.5)
PLATELET # BLD AUTO: 295 K/UL — SIGNIFICANT CHANGE UP (ref 150–400)
PMV BLD: 10.1 FL — SIGNIFICANT CHANGE UP (ref 7–13)
POTASSIUM SERPL-MCNC: 3.9 MMOL/L — SIGNIFICANT CHANGE UP (ref 3.5–5.3)
POTASSIUM SERPL-SCNC: 3.9 MMOL/L — SIGNIFICANT CHANGE UP (ref 3.5–5.3)
RBC # BLD: 3.36 M/UL — LOW (ref 4.2–5.8)
RBC # FLD: 13 % — SIGNIFICANT CHANGE UP (ref 10.3–14.5)
SODIUM SERPL-SCNC: 139 MMOL/L — SIGNIFICANT CHANGE UP (ref 135–145)
WBC # BLD: 8.68 K/UL — SIGNIFICANT CHANGE UP (ref 3.8–10.5)
WBC # FLD AUTO: 8.68 K/UL — SIGNIFICANT CHANGE UP (ref 3.8–10.5)

## 2019-09-11 PROCEDURE — 99233 SBSQ HOSP IP/OBS HIGH 50: CPT

## 2019-09-11 RX ADMIN — ENOXAPARIN SODIUM 40 MILLIGRAM(S): 100 INJECTION SUBCUTANEOUS at 12:38

## 2019-09-11 RX ADMIN — AMLODIPINE BESYLATE 10 MILLIGRAM(S): 2.5 TABLET ORAL at 07:12

## 2019-09-11 RX ADMIN — CALCITRIOL 0.5 MICROGRAM(S): 0.5 CAPSULE ORAL at 07:12

## 2019-09-11 RX ADMIN — Medication 2500 MILLIGRAM(S): at 07:10

## 2019-09-11 RX ADMIN — Medication 81 MILLIGRAM(S): at 14:39

## 2019-09-11 RX ADMIN — Medication 2500 MILLIGRAM(S): at 21:56

## 2019-09-11 RX ADMIN — Medication 137 MICROGRAM(S): at 07:12

## 2019-09-11 RX ADMIN — Medication 100 MILLIGRAM(S): at 14:39

## 2019-09-11 RX ADMIN — SIMVASTATIN 20 MILLIGRAM(S): 20 TABLET, FILM COATED ORAL at 21:56

## 2019-09-11 RX ADMIN — Medication 2500 MILLIGRAM(S): at 14:39

## 2019-09-11 RX ADMIN — Medication 25 MILLIGRAM(S): at 07:11

## 2019-09-11 NOTE — PROGRESS NOTE ADULT - SUBJECTIVE AND OBJECTIVE BOX
Plastic Surgery    SUBJECTIVE:   Doing well    VITALS  T(C): 37.1 (09-11-19 @ 06:54), Max: 37.1 (09-10-19 @ 10:41)  HR: 86 (09-11-19 @ 06:54) (82 - 92)  BP: 147/60 (09-11-19 @ 06:54) (122/63 - 147/60)  RR: 18 (09-11-19 @ 06:54) (16 - 20)  SpO2: 97% (09-11-19 @ 06:54) (94% - 97%)  CAPILLARY BLOOD GLUCOSE          Is/Os    09-10 @ 07:01  -  09-11 @ 07:00  --------------------------------------------------------  IN:    Enteral Tube Flush: 150 mL  Total IN: 150 mL    OUT:    Bulb: 7 mL    Voided: 1175 mL  Total OUT: 1182 mL    Total NET: -1032 mL          PHYSICAL EXAM:   General: NAD, Lying in bed comfortably  Heent; Neck soft, strong doppler signal.    Extrem: RFFF vac holding suction, STSG donor site intact    MEDICATIONS (STANDING): amLODIPine   Tablet 10 milliGRAM(s) Oral daily  aspirin  chewable 81 milliGRAM(s) Enteral Tube daily  calcitriol  Solution 0.5 MICROGram(s) Oral <User Schedule>  calcium carbonate   Suspension 2500 milliGRAM(s) Oral three times a day  docusate sodium Liquid 100 milliGRAM(s) Oral daily  enoxaparin Injectable 40 milliGRAM(s) SubCutaneous daily  hydrochlorothiazide 25 milliGRAM(s) Oral daily  levothyroxine 137 MICROGram(s) Oral daily  senna Syrup 10 milliLiter(s) Oral at bedtime  simvastatin 20 milliGRAM(s) Oral at bedtime    MEDICATIONS (PRN):acetaminophen    Suspension .. 650 milliGRAM(s) Oral every 6 hours PRN Mild Pain (1 - 3)  oxyCODONE    IR 5 milliGRAM(s) Oral every 3 hours PRN Moderate Pain (4 - 6)  oxyCODONE    IR 10 milliGRAM(s) Oral every 3 hours PRN Severe Pain (7 - 10)      LABS  CBC (09-10 @ 07:05)                              10.0<L>                         9.86    )----------------(  268        --    % Neutrophils, --    % Lymphocytes, ANC: --                                  31.2<L>    BMP (09-10 @ 07:05)             138     |  94<L>   |  15    		Ca++ --      Ca 7.8<L>             ---------------------------------( 150<H>		Mg 1.8                3.7     |  32<H>   |  0.90  			Ph 4.9<H>                IMAGING STUDIES

## 2019-09-11 NOTE — PROGRESS NOTE ADULT - SUBJECTIVE AND OBJECTIVE BOX
CHIEF COMPLAINT: Patient is a 74y old  male who presents with a chief complaint of shortness of breath (11 Sep 2019 07:16)      SUBJECTIVE / OVERNIGHT EVENTS:    No complaints. Denies pain. Denies SOB. Denies constipation.    MEDICATIONS  (STANDING):  amLODIPine   Tablet 10 milliGRAM(s) Oral daily  aspirin  chewable 81 milliGRAM(s) Enteral Tube daily  calcitriol  Solution 0.5 MICROGram(s) Oral <User Schedule>  calcium carbonate   Suspension 2500 milliGRAM(s) Oral three times a day  docusate sodium Liquid 100 milliGRAM(s) Oral daily  enoxaparin Injectable 40 milliGRAM(s) SubCutaneous daily  hydrochlorothiazide 25 milliGRAM(s) Oral daily  levothyroxine 137 MICROGram(s) Oral daily  senna Syrup 10 milliLiter(s) Oral at bedtime  simvastatin 20 milliGRAM(s) Oral at bedtime    MEDICATIONS  (PRN):  acetaminophen    Suspension .. 650 milliGRAM(s) Oral every 6 hours PRN Mild Pain (1 - 3)  oxyCODONE    IR 5 milliGRAM(s) Oral every 3 hours PRN Moderate Pain (4 - 6)  oxyCODONE    IR 10 milliGRAM(s) Oral every 3 hours PRN Severe Pain (7 - 10)      VITALS:  T(F): 98.8 (09-11-19 @ 14:00), Max: 98.8 (09-11-19 @ 06:54)  HR: 87 (09-11-19 @ 14:00) (82 - 92)  BP: 142/52 (09-11-19 @ 14:00) (122/63 - 148/50)  RR: 16 (09-11-19 @ 14:00) (16 - 20)  SpO2: 93% (09-11-19 @ 14:00)      CAPILLARY BLOOD GLUCOSE    Output     I&O's Summary  T(F): 98.8 (09-11-19 @ 14:00), Max: 98.8 (09-11-19 @ 06:54)  HR: 87 (09-11-19 @ 14:00) (82 - 92)  BP: 142/52 (09-11-19 @ 14:00) (122/63 - 148/50)  RR: 16 (09-11-19 @ 14:00) (16 - 20)  SpO2: 93% (09-11-19 @ 14:00)    PHYSICAL EXAM:  GENERAL: NAD, well-developed  HEAD:  Atraumatic, Normocephalic  EYES: EOMI  NECK: Supple, No JVD  CHEST/LUNG: nonlabored breathing  HEART: nl S1/S2  ABDOMEN: nondistended, soft  EXTREMITIES:  no LE edema  PSYCH: alert, answering questions appropriately  NEUROLOGY: non-focal  SKIN: No rashes noted    LABS:              9.8                  139  | 31   | 14           8.68  >-----------< 295     ------------------------< 111                   30.3                 3.9  | 95   | 0.92                                         Ca 7.9   Mg 2.0   Ph 4.1                        MICROBIOLOGY:        RADIOLOGY & ADDITIONAL TESTS:    Imaging Personally Reviewed:    < from: Xray Chest 1 View- PORTABLE-Urgent (09.06.19 @ 00:49) >      < end of copied text >        [x] Care Discussed with Consultants/Other Providers:      Ruel Man M.D.  Hospitalist  Pager [phone]

## 2019-09-11 NOTE — PROGRESS NOTE ADULT - ASSESSMENT
74M POD6 s/p total laryngectomy, b/l neck dissection, total thyroidectomy, with LRFF and STSG for reconstruction.    - Will D/C VAC dressing and STSG dressing tomorrow (POD7)  -cont q4h flap check   -encourage oob/ambo  -care per primary team

## 2019-09-11 NOTE — PROGRESS NOTE ADULT - SUBJECTIVE AND OBJECTIVE BOX
Patient seen and examined at the bedside. No acute events overnight. Tolerating tube feeds.    Vital Signs Last 24 Hrs  T(C): 37 (11 Sep 2019 02:01), Max: 37.1 (10 Sep 2019 10:41)  T(F): 98.6 (11 Sep 2019 02:01), Max: 98.7 (10 Sep 2019 10:41)  HR: 82 (11 Sep 2019 02:01) (82 - 92)  BP: 138/62 (11 Sep 2019 02:01) (122/63 - 141/63)  BP(mean): --  RR: 20 (11 Sep 2019 02:01) (16 - 20)  SpO2: 96% (11 Sep 2019 02:01) (94% - 97%)    NAD, awake and alert  Breathing comfortably on humidified TC   OC/OP: wnl  Neck: soft, flat, Incision c/d/i   LT in place, humidified trach collar  [employer] doppler signal strong  L arm with ACE wrap, wound vac in place and IDA with scant drainage     A/P: 74M s/p TL, TT, bilateral SND, L RFFF, L STSG 9/4     -F/U AM Ca  - LT  -IS/CPT  -bolus tube feeds   -Esophagram tmw   - SLP for HME  -AppUpper - ASO doppler  -OOBTC   -PT/OT  -PM&R   -asa/lov  -synthroid  -max oral Ca repletions

## 2019-09-12 LAB
ANION GAP SERPL CALC-SCNC: 15 MMO/L — HIGH (ref 7–14)
BUN SERPL-MCNC: 18 MG/DL — SIGNIFICANT CHANGE UP (ref 7–23)
CALCIUM SERPL-MCNC: 8.7 MG/DL — SIGNIFICANT CHANGE UP (ref 8.4–10.5)
CHLORIDE SERPL-SCNC: 94 MMOL/L — LOW (ref 98–107)
CO2 SERPL-SCNC: 29 MMOL/L — SIGNIFICANT CHANGE UP (ref 22–31)
CREAT SERPL-MCNC: 0.94 MG/DL — SIGNIFICANT CHANGE UP (ref 0.5–1.3)
GLUCOSE BLDC GLUCOMTR-MCNC: 136 MG/DL — HIGH (ref 70–99)
GLUCOSE SERPL-MCNC: 98 MG/DL — SIGNIFICANT CHANGE UP (ref 70–99)
MAGNESIUM SERPL-MCNC: 2.1 MG/DL — SIGNIFICANT CHANGE UP (ref 1.6–2.6)
PHOSPHATE SERPL-MCNC: 4 MG/DL — SIGNIFICANT CHANGE UP (ref 2.5–4.5)
POTASSIUM SERPL-MCNC: 3.7 MMOL/L — SIGNIFICANT CHANGE UP (ref 3.5–5.3)
POTASSIUM SERPL-SCNC: 3.7 MMOL/L — SIGNIFICANT CHANGE UP (ref 3.5–5.3)
SODIUM SERPL-SCNC: 138 MMOL/L — SIGNIFICANT CHANGE UP (ref 135–145)
T4 FREE SERPL-MCNC: 0.98 NG/DL — SIGNIFICANT CHANGE UP (ref 0.9–1.8)

## 2019-09-12 PROCEDURE — 99233 SBSQ HOSP IP/OBS HIGH 50: CPT | Mod: GC

## 2019-09-12 PROCEDURE — 99232 SBSQ HOSP IP/OBS MODERATE 35: CPT

## 2019-09-12 PROCEDURE — 93010 ELECTROCARDIOGRAM REPORT: CPT

## 2019-09-12 PROCEDURE — 74220 X-RAY XM ESOPHAGUS 1CNTRST: CPT | Mod: 26

## 2019-09-12 RX ORDER — CALCITRIOL 0.5 UG/1
0.25 CAPSULE ORAL DAILY
Refills: 0 | Status: DISCONTINUED | OUTPATIENT
Start: 2019-09-12 | End: 2019-09-17

## 2019-09-12 RX ORDER — CALCIUM CARBONATE 500(1250)
2500 TABLET ORAL
Refills: 0 | Status: DISCONTINUED | OUTPATIENT
Start: 2019-09-12 | End: 2019-09-17

## 2019-09-12 RX ADMIN — ENOXAPARIN SODIUM 40 MILLIGRAM(S): 100 INJECTION SUBCUTANEOUS at 13:35

## 2019-09-12 RX ADMIN — Medication 81 MILLIGRAM(S): at 13:35

## 2019-09-12 RX ADMIN — Medication 2500 MILLIGRAM(S): at 06:11

## 2019-09-12 RX ADMIN — AMLODIPINE BESYLATE 10 MILLIGRAM(S): 2.5 TABLET ORAL at 06:11

## 2019-09-12 RX ADMIN — Medication 2500 MILLIGRAM(S): at 13:35

## 2019-09-12 RX ADMIN — SIMVASTATIN 20 MILLIGRAM(S): 20 TABLET, FILM COATED ORAL at 21:06

## 2019-09-12 RX ADMIN — Medication 137 MICROGRAM(S): at 06:11

## 2019-09-12 RX ADMIN — CALCITRIOL 0.5 MICROGRAM(S): 0.5 CAPSULE ORAL at 06:12

## 2019-09-12 RX ADMIN — Medication 25 MILLIGRAM(S): at 06:11

## 2019-09-12 NOTE — PROGRESS NOTE ADULT - ASSESSMENT
73 y/o man with HTN, HLD, carotid stenosis laryngeal ca with extralaryngeal spread who presented with SOB now s/p total laryngectomy, b/l neck dissection, total thyroidectomy, with LRFF and STSG for reconstruction.    #Laryngeal ca: s/p total laryngectomy, b/l neck dissection, total thyroidectomy, with LRFF and STSG for reconstruction on 9/5/19.   - Care per ENT/plastic surgery, appreciate input  - pain currently well controlled  - esophagram without leaks  - SLP eval    #Hypocalcemia:  - continue calcitriol, calcium carbonate  - monitor Ca level    #HTN: continue home medication regimen  - HCTZ 25 mg daily  - amlodipine 10 mg daily  - BP acceptable    #HLD: continue statin    #Carotid stenosis: continue aspirin    #Hypothyroidism: Continue synthroid    #DVT ppx: Lovenox subq

## 2019-09-12 NOTE — PROGRESS NOTE ADULT - PROBLEM SELECTOR PLAN 2
s/p total thyroidectomy along with laryngectomy and partial parathyroidectomy for laryngeal cancer.   TSH -3.5, TT4 5.89, TT3 68. Patient is clinically euthyroid.  FT4 .98   -repeat  TSH in 4 weeks.     - c/w LT4 137 mcg NGT   - when giving bolus feeds, please give it at least 30 minutes after Levothyroxine     f/u Dr. Martinez appt above

## 2019-09-12 NOTE — PROGRESS NOTE ADULT - SUBJECTIVE AND OBJECTIVE BOX
Patient seen at 3PM    Chief Complaint: Hypocalcemia and hypothyroidism     History: Feels well with no complaints.     MEDICATIONS  (STANDING):  amLODIPine   Tablet 10 milliGRAM(s) Oral daily  aspirin  chewable 81 milliGRAM(s) Enteral Tube daily  calcitriol  Solution 0.5 MICROGram(s) Oral <User Schedule>  calcium carbonate   Suspension 2500 milliGRAM(s) Oral three times a day  docusate sodium Liquid 100 milliGRAM(s) Oral daily  enoxaparin Injectable 40 milliGRAM(s) SubCutaneous daily  hydrochlorothiazide 25 milliGRAM(s) Oral daily  levothyroxine 137 MICROGram(s) Oral daily  senna Syrup 10 milliLiter(s) Oral at bedtime  simvastatin 20 milliGRAM(s) Oral at bedtime    MEDICATIONS  (PRN):  acetaminophen    Suspension .. 650 milliGRAM(s) Oral every 6 hours PRN Mild Pain (1 - 3)  oxyCODONE    IR 5 milliGRAM(s) Oral every 3 hours PRN Moderate Pain (4 - 6)  oxyCODONE    IR 10 milliGRAM(s) Oral every 3 hours PRN Severe Pain (7 - 10)      Allergies    penicillin (Rash)    Intolerances        PHYSICAL EXAM:  VITALS: T(C): 37 (09-12-19 @ 14:00)  T(F): 98.6 (09-12-19 @ 14:00), Max: 99.5 (09-12-19 @ 06:08)  HR: 92 (09-12-19 @ 14:00) (89 - 92)  BP: 146/61 (09-12-19 @ 14:00) (118/59 - 149/74)  RR:  (18 - 20)  SpO2:  (93% - 96%)  Wt(kg): --  GENERAL: NAD, well-groomed, well-developed  RESPIRATORY: Clear to auscultation bilaterally; No rales, rhonchi, wheezing, or rubs  CARDIOVASCULAR: Regular rate and rhythm; No murmurs  NEURO: AOx3, moves all extremities spontaenuously   PSYCH:  reactive affect, euthymic mood      POCT Blood Glucose.: 136 mg/dL (09-12-19 @ 16:00)      09-12    138  |  94<L>  |  18  ----------------------------<  98  3.7   |  29  |  0.94    EGFR if : 93  EGFR if non : 80    Ca    8.7      09-12  Mg     2.1     09-12  Phos  4.0     09-12            Thyroid Function Tests:  09-12 @ 06:30 TSH -- FreeT4 0.98 T3 -- Anti TPO -- Anti Thyroglobulin Ab -- TSI --  09-08 @ 06:35 TSH 3.58 FreeT4 -- T3 68.4 Anti TPO -- Anti Thyroglobulin Ab -- TSI --

## 2019-09-12 NOTE — RAPID RESPONSE TEAM SUMMARY - NSOTHERINTERVENTIONSRRT_GEN_ALL_CORE
-Avoid BB and anticholinergics  -Recommend Bedrest given multiple vasovagal episodes  -No IVF given since BP slightly elevated  -ECG unchanged from previous

## 2019-09-12 NOTE — PROGRESS NOTE ADULT - PROBLEM SELECTOR PLAN 1
s/p partial parathyroidectomy and total thyroidectomy along with laryngectomy for laryngeal cancer, now with post op hypoparathyroidism.   - Intact PTH level 2.73 with corrected calcium now at 9.6  - change calcium supplementation with oral calcium carbonate suspension 2500 mg qday  - c/w Calcitriol solution 0.5 mg to qday via NG tube given hyperphosphatemia   - Monitor serum calcium, phos qday   - Patient can follow with [facility], prefers near [facility].   Dr. Michelle Dominguez 9/27 8:30AM   [address] [place] [address] [phone] s/p partial parathyroidectomy and total thyroidectomy along with laryngectomy for laryngeal cancer, now with post op hypoparathyroidism.   - Intact PTH level 2.73 with corrected calcium now at 9.6  - change calcium supplementation with oral calcium carbonate suspension 2500 mg BID  - change Calcitriol solution to 0.25 mg qday via NG tube   - Monitor serum calcium, phos qday   - Patient can follow with [facility]ology, prefers near [facility].   Dr. Michelle Dominguez 9/27 8:30AM   [address] [place], [place] [id number] [phone]

## 2019-09-12 NOTE — RAPID RESPONSE TEAM SUMMARY - NSSITUATIONBACKGROUNDRRT_GEN_ALL_CORE
73 y/o man with HTN, HLD, carotid stenosis laryngeal ca with extralaryngeal spread who presented with SOB now s/p total laryngectomy, b/l neck dissection, total thyroidectomy, with LRFF and STSG for reconstruction.  -RRT for similar reason from first RRT. pt in bathroom and as he was standing up he felt dizzy and weak. Staff was with him and assisted him to the ground. NO head trauma witnessed.

## 2019-09-13 LAB
ALBUMIN SERPL ELPH-MCNC: 3.2 G/DL — LOW (ref 3.3–5)
ALP SERPL-CCNC: 58 U/L — SIGNIFICANT CHANGE UP (ref 40–120)
ALT FLD-CCNC: 14 U/L — SIGNIFICANT CHANGE UP (ref 4–41)
ANION GAP SERPL CALC-SCNC: 12 MMO/L — SIGNIFICANT CHANGE UP (ref 7–14)
AST SERPL-CCNC: 22 U/L — SIGNIFICANT CHANGE UP (ref 4–40)
BILIRUB SERPL-MCNC: 0.4 MG/DL — SIGNIFICANT CHANGE UP (ref 0.2–1.2)
BUN SERPL-MCNC: 16 MG/DL — SIGNIFICANT CHANGE UP (ref 7–23)
CALCIUM SERPL-MCNC: 8.3 MG/DL — LOW (ref 8.4–10.5)
CHLORIDE SERPL-SCNC: 96 MMOL/L — LOW (ref 98–107)
CO2 SERPL-SCNC: 30 MMOL/L — SIGNIFICANT CHANGE UP (ref 22–31)
CREAT SERPL-MCNC: 1.05 MG/DL — SIGNIFICANT CHANGE UP (ref 0.5–1.3)
GLUCOSE SERPL-MCNC: 119 MG/DL — HIGH (ref 70–99)
HCT VFR BLD CALC: 31 % — LOW (ref 39–50)
HGB BLD-MCNC: 9.8 G/DL — LOW (ref 13–17)
MCHC RBC-ENTMCNC: 28.3 PG — SIGNIFICANT CHANGE UP (ref 27–34)
MCHC RBC-ENTMCNC: 31.6 % — LOW (ref 32–36)
MCV RBC AUTO: 89.6 FL — SIGNIFICANT CHANGE UP (ref 80–100)
NRBC # FLD: 0 K/UL — SIGNIFICANT CHANGE UP (ref 0–0)
PLATELET # BLD AUTO: 337 K/UL — SIGNIFICANT CHANGE UP (ref 150–400)
PMV BLD: 9.2 FL — SIGNIFICANT CHANGE UP (ref 7–13)
POTASSIUM SERPL-MCNC: 3.9 MMOL/L — SIGNIFICANT CHANGE UP (ref 3.5–5.3)
POTASSIUM SERPL-SCNC: 3.9 MMOL/L — SIGNIFICANT CHANGE UP (ref 3.5–5.3)
PROT SERPL-MCNC: 6.4 G/DL — SIGNIFICANT CHANGE UP (ref 6–8.3)
RBC # BLD: 3.46 M/UL — LOW (ref 4.2–5.8)
RBC # FLD: 12.9 % — SIGNIFICANT CHANGE UP (ref 10.3–14.5)
SODIUM SERPL-SCNC: 138 MMOL/L — SIGNIFICANT CHANGE UP (ref 135–145)
WBC # BLD: 7.89 K/UL — SIGNIFICANT CHANGE UP (ref 3.8–10.5)
WBC # FLD AUTO: 7.89 K/UL — SIGNIFICANT CHANGE UP (ref 3.8–10.5)

## 2019-09-13 PROCEDURE — 99232 SBSQ HOSP IP/OBS MODERATE 35: CPT | Mod: GC

## 2019-09-13 PROCEDURE — 99233 SBSQ HOSP IP/OBS HIGH 50: CPT

## 2019-09-13 RX ORDER — CALCIUM CARBONATE 500(1250)
1 TABLET ORAL
Qty: 30 | Refills: 0
Start: 2019-09-13 | End: 2019-09-27

## 2019-09-13 RX ORDER — CALCITRIOL 0.5 UG/1
1 CAPSULE ORAL
Qty: 0 | Refills: 0 | DISCHARGE
Start: 2019-09-13 | End: 2019-09-27

## 2019-09-13 RX ORDER — LEVOTHYROXINE SODIUM 125 MCG
1 TABLET ORAL
Qty: 30 | Refills: 0
Start: 2019-09-13 | End: 2019-10-12

## 2019-09-13 RX ORDER — OXYCODONE HYDROCHLORIDE 5 MG/1
5 TABLET ORAL EVERY 6 HOURS
Refills: 0 | Status: DISCONTINUED | OUTPATIENT
Start: 2019-09-13 | End: 2019-09-17

## 2019-09-13 RX ORDER — OXYCODONE HYDROCHLORIDE 5 MG/1
10 TABLET ORAL EVERY 6 HOURS
Refills: 0 | Status: DISCONTINUED | OUTPATIENT
Start: 2019-09-13 | End: 2019-09-17

## 2019-09-13 RX ORDER — CALCITRIOL 0.5 UG/1
1 CAPSULE ORAL
Qty: 15 | Refills: 0
Start: 2019-09-13 | End: 2019-09-27

## 2019-09-13 RX ORDER — PETROLATUM,WHITE
1 JELLY (GRAM) TOPICAL DAILY
Refills: 0 | Status: DISCONTINUED | OUTPATIENT
Start: 2019-09-13 | End: 2019-09-17

## 2019-09-13 RX ADMIN — CALCITRIOL 0.25 MICROGRAM(S): 0.5 CAPSULE ORAL at 14:29

## 2019-09-13 RX ADMIN — Medication 81 MILLIGRAM(S): at 14:29

## 2019-09-13 RX ADMIN — AMLODIPINE BESYLATE 10 MILLIGRAM(S): 2.5 TABLET ORAL at 05:26

## 2019-09-13 RX ADMIN — Medication 1 APPLICATION(S): at 17:52

## 2019-09-13 RX ADMIN — Medication 25 MILLIGRAM(S): at 05:25

## 2019-09-13 RX ADMIN — Medication 137 MICROGRAM(S): at 05:25

## 2019-09-13 RX ADMIN — Medication 2500 MILLIGRAM(S): at 17:52

## 2019-09-13 RX ADMIN — Medication 2500 MILLIGRAM(S): at 05:25

## 2019-09-13 RX ADMIN — SIMVASTATIN 20 MILLIGRAM(S): 20 TABLET, FILM COATED ORAL at 22:00

## 2019-09-13 RX ADMIN — ENOXAPARIN SODIUM 40 MILLIGRAM(S): 100 INJECTION SUBCUTANEOUS at 14:29

## 2019-09-13 NOTE — PROGRESS NOTE ADULT - SUBJECTIVE AND OBJECTIVE BOX
Patient seen and examined at the bedside. Rapid called on patient yesterday after he passed out while having bowel movement. Slumped over on toilet. No head strike. Post event EKG and blood sugar wnl. Feeling better today. Cardiology said no intervention and management per medicine. Pending echo.      Passed esophogram: now tolerating clear liquid diet.    NAD, awake and alert  Breathing comfortably on humidified TC   OC/OP: wnl  Neck: soft, flat, Incision c/d/i   LT in place, humidified trach collar  [employer] doppler signal strong  L arm with ACE wrap, wound vac in place and IDA with scant drainage     A/P: 74M s/p TL, TT, bilateral SND, L RFFF, L STSG 9/4     -CLD  -F/U echo per medicine for syncope work up   -F/U AM Ca  - LT  -IS/CPT  -bolus tube feeds   -f/u PRS re: wvac  -HME for Darlin tube   -Symphogen doppler  -OOBTC   -PT/OT  -PM&R   -asa/lov  -synthroid  -max oral Ca repletions

## 2019-09-13 NOTE — SPEECH LANGUAGE PATHOLOGY EVALUATION - SLP PROGNOSIS
DIAGNOSIS CONT: Discussed follow up with this service following discharge for ongoing training in use of electrolarynx and/or TEP. Pt verbalized understanding and is in agreement with POC.

## 2019-09-13 NOTE — PROGRESS NOTE ADULT - PROBLEM SELECTOR PLAN 2
s/p total thyroidectomy along with laryngectomy and partial parathyroidectomy for laryngeal cancer.   TSH -3.5, TT4 5.89, TT3 68. Patient is clinically euthyroid.  FT4 .98   -repeat  TSH in 4 weeks.     - c/w LT4 137 mcg NGT   - when giving bolus feeds, please give it at least 30 minutes after Levothyroxine     f/u Dr. Martinez appt above s/p total thyroidectomy along with laryngectomy and partial parathyroidectomy for laryngeal cancer.   TSH -3.5, TT4 5.89, TT3 68. Patient is clinically euthyroid.  FT4 0.98   -repeat  TSH in 4 weeks.     - c/w LT4 137 mcg NGT   - when giving bolus feeds, please give it at least 30 minutes after Levothyroxine     f/u Dr. Martinez appt above s/p total thyroidectomy along with laryngectomy and partial parathyroidectomy for laryngeal cancer.   TSH -3.5, TT4 5.89, TT3 68. Patient is clinically euthyroid.  FT4 0.98   -Repeat FT4 9/16   -repeat  TSH in 4 weeks.    - c/w LT4 137 mcg NGT   - when giving bolus feeds, please give it at least 30 minutes after Levothyroxine     f/u Dr. Martinez appt above

## 2019-09-13 NOTE — PROGRESS NOTE ADULT - ASSESSMENT
73 y/o man with HTN, HLD, carotid stenosis laryngeal ca with extralaryngeal spread who presented with SOB now s/p total laryngectomy, b/l neck dissection, total thyroidectomy, with LRFF and STSG for reconstruction.    #Syncope: after BM, while standing up from toilet  - given around time of BM, likely vasovagal syncope  - orthostatics negative  - recommend TTE given ejection murmur to r/o structure heart disease  - fall precautions    #Laryngeal ca: s/p total laryngectomy, b/l neck dissection, total thyroidectomy, with LRFF and STSG for reconstruction on 9/5/19.   - Care per ENT/plastic surgery, appreciate input  - pain currently well controlled  - esophagram without leaks  - SLP eval    #Hypocalcemia:  - continue calcitriol, calcium carbonate  - monitor Ca level    #HTN: continue home medication regimen  - HCTZ 25 mg daily  - amlodipine 10 mg daily  - BP acceptable    #HLD: continue statin    #Carotid stenosis: continue aspirin    #Hypothyroidism: Continue synthroid    #DVT ppx: Lovenox subq 2017

## 2019-09-13 NOTE — PROGRESS NOTE ADULT - PROBLEM SELECTOR PLAN 1
s/p partial parathyroidectomy and total thyroidectomy along with laryngectomy for laryngeal cancer, now with post op hypoparathyroidism.   - Intact PTH level 2.73 with corrected calcium now at 8.9  - c/w calcium supplementation with oral calcium carbonate suspension 2500 mg BID  - c/w Calcitriol solution to 0.25 mg qday via NG tube   - Monitor serum calcium, phos qday   - Patient can follow with [facility] Endocrinology, prefers near [facility].   Dr. Michelle Dominguez 9/27 8:30AM   [address] [place], [place] [id number] [phone]

## 2019-09-13 NOTE — PROGRESS NOTE ADULT - SUBJECTIVE AND OBJECTIVE BOX
Chief Complaint: Hypocalcemia and hypothyroidism     History: Feels well with no sxs of dizziness.     MEDICATIONS  (STANDING):  amLODIPine   Tablet 10 milliGRAM(s) Oral daily  AQUAPHOR (petrolatum Ointment) 1 Application(s) Topical daily  aspirin  chewable 81 milliGRAM(s) Enteral Tube daily  calcitriol   Capsule 0.25 MICROGram(s) Oral daily  calcium carbonate   Suspension 2500 milliGRAM(s) Oral two times a day  docusate sodium Liquid 100 milliGRAM(s) Oral daily  enoxaparin Injectable 40 milliGRAM(s) SubCutaneous daily  hydrochlorothiazide 25 milliGRAM(s) Oral daily  levothyroxine 137 MICROGram(s) Oral daily  senna Syrup 10 milliLiter(s) Oral at bedtime  simvastatin 20 milliGRAM(s) Oral at bedtime    MEDICATIONS  (PRN):  acetaminophen    Suspension .. 650 milliGRAM(s) Oral every 6 hours PRN Mild Pain (1 - 3)      Allergies    penicillin (Rash)    Intolerances        PHYSICAL EXAM:  VITALS: T(C): 37.2 (09-13-19 @ 10:16)  T(F): 98.9 (09-13-19 @ 10:16), Max: 98.9 (09-13-19 @ 10:16)  HR: 62 (09-13-19 @ 10:16) (62 - 99)  BP: 133/48 (09-13-19 @ 10:16) (129/56 - 136/55)  RR:  (18 - 18)  SpO2:  (93% - 98%)  Wt(kg): --  GENERAL: NAD, well-groomed, well-developed  RESPIRATORY: Clear to auscultation bilaterally; No rales, rhonchi, wheezing, or rubs  CARDIOVASCULAR: Regular rate and rhythm; No murmurs  NEURO: AOx3, moves all extremities spontaenuously   PSYCH:  reactive affect, euthymic mood      POCT Blood Glucose.: 136 mg/dL (09-12-19 @ 16:00)      09-13    138  |  96<L>  |  16  ----------------------------<  119<H>  3.9   |  30  |  1.05    EGFR if : 81  EGFR if non : 70    Ca    8.3<L>      09-13  Mg     2.1     09-12  Phos  4.0     09-12    TPro  6.4  /  Alb  3.2<L>  /  TBili  0.4  /  DBili  x   /  AST  22  /  ALT  14  /  AlkPhos  58  09-13          Thyroid Function Tests:  09-12 @ 06:30 TSH -- FreeT4 0.98 T3 -- Anti TPO -- Anti Thyroglobulin Ab -- TSI --  09-08 @ 06:35 TSH 3.58 FreeT4 -- T3 68.4 Anti TPO -- Anti Thyroglobulin Ab -- TSI --

## 2019-09-13 NOTE — PROGRESS NOTE ADULT - ASSESSMENT
74M POD7 s/p total laryngectomy, b/l neck dissection, total thyroidectomy, with LRFF and STSG for reconstruction.    -cont q4h flap check   -encourage oob/ambo  -care per primary team  -aquaphor to left thigh donor site bID  -xeroform gauze, ace to left forearm QOD

## 2019-09-13 NOTE — PROGRESS NOTE ADULT - SUBJECTIVE AND OBJECTIVE BOX
CHIEF COMPLAINT: Patient is a 74y old  male who presents with a chief complaint of shortness of breath (13 Sep 2019 07:42)      SUBJECTIVE / OVERNIGHT EVENTS:    Denies lightheadedness. Denies h/o heart disease. Denies other complaints.    MEDICATIONS  (STANDING):  amLODIPine   Tablet 10 milliGRAM(s) Oral daily  AQUAPHOR (petrolatum Ointment) 1 Application(s) Topical daily  aspirin  chewable 81 milliGRAM(s) Enteral Tube daily  calcitriol   Capsule 0.25 MICROGram(s) Oral daily  calcium carbonate   Suspension 2500 milliGRAM(s) Oral two times a day  docusate sodium Liquid 100 milliGRAM(s) Oral daily  enoxaparin Injectable 40 milliGRAM(s) SubCutaneous daily  hydrochlorothiazide 25 milliGRAM(s) Oral daily  levothyroxine 137 MICROGram(s) Oral daily  senna Syrup 10 milliLiter(s) Oral at bedtime  simvastatin 20 milliGRAM(s) Oral at bedtime    MEDICATIONS  (PRN):  acetaminophen    Suspension .. 650 milliGRAM(s) Oral every 6 hours PRN Mild Pain (1 - 3)      VITALS:  T(F): 98.9 (09-13-19 @ 10:16), Max: 98.9 (09-13-19 @ 10:16)  HR: 62 (09-13-19 @ 10:16) (62 - 99)  BP: 133/48 (09-13-19 @ 10:16) (129/56 - 146/61)  RR: 18 (09-13-19 @ 10:16) (18 - 18)  SpO2: 93% (09-13-19 @ 10:16)      CAPILLARY BLOOD GLUCOSE    Output     I&O's Summary  T(F): 98.9 (09-13-19 @ 10:16), Max: 98.9 (09-13-19 @ 10:16)  HR: 62 (09-13-19 @ 10:16) (62 - 99)  BP: 133/48 (09-13-19 @ 10:16) (129/56 - 146/61)  RR: 18 (09-13-19 @ 10:16) (18 - 18)  SpO2: 93% (09-13-19 @ 10:16)    PHYSICAL EXAM:  GENERAL: NAD, well-developed  HEAD:  Atraumatic, Normocephalic  EYES: EOMI  NECK: Supple, No JVD  CHEST/LUNG: nonlabored breathing, CTAB  HEART: nl S1/S2, +3/6 JAN  ABDOMEN: nondistended, soft  EXTREMITIES:  no LE edema  PSYCH: alert, answering questions appropriately  NEUROLOGY: non-focal  SKIN: No rashes noted    LABS:              9.8                  138  | 30   | 16           7.89  >-----------< 337     ------------------------< 119                   31.0                 3.9  | 96   | 1.05                                         Ca 8.3   Mg x     Ph x           TPro  6.4  /  Alb  3.2      TBili  0.4  /  DBili  x         AST  22  /  ALT  14            AlkPhos  58                    MICROBIOLOGY:        RADIOLOGY & ADDITIONAL TESTS:    Imaging Personally Reviewed:    < from: Xray Esophagram (09.12.19 @ 10:43) >      < end of copied text >        [x] Care Discussed with Consultants/Other Providers:      Ruel Man M.D.  Hospitalist  Pager [phone]

## 2019-09-13 NOTE — PROGRESS NOTE ADULT - SUBJECTIVE AND OBJECTIVE BOX
Plastic Surgery    SUBJECTIVE:   Seen and examined. swallow study with no leak    Vital Signs Last 24 Hrs  T(C): 37 (13 Sep 2019 06:00), Max: 37.2 (12 Sep 2019 11:00)  T(F): 98.6 (13 Sep 2019 06:00), Max: 99 (12 Sep 2019 11:00)  HR: 78 (13 Sep 2019 06:00) (78 - 99)  BP: 136/55 (13 Sep 2019 06:00) (129/56 - 146/61)  BP(mean): --  RR: 18 (13 Sep 2019 06:00) (18 - 18)  SpO2: 98% (13 Sep 2019 06:00) (95% - 98%)      PHYSICAL EXAM:   General: NAD, Lying in bed comfortably  Heent; Neck soft, strong doppler signal.    Extrem: RFFF vac holding suction, STSG donor site intact

## 2019-09-13 NOTE — SPEECH LANGUAGE PATHOLOGY EVALUATION - SLP DIAGNOSIS
Oroperipheral examination revealed adequate labio-lingual ROM, strength, speed, and coordination. Pt presented with normal rate of breathing, did not appear to be in respiratory distress. Pt was aphonic upon verbalization attempts. Patient was provided with electrolarynx to facilitate functional communication. Pt demonstrated adequate placement of intra-oral adapter and on/off function with minimal verbal cues (improved in comparison to previous session). Pt's speech intelligibility during automatic speech tasks and single word level deemed fair, with mild improvement noted from the previous session. Intelligibility mildly improved when pt incorporated learned strategies of over-articulation and slowed rate of production. Pt encouraged to trial electrolarynx daily in order to continue to improve speech intelligibility, as well as continue using gesture/written expression to assist with effective communication.

## 2019-09-13 NOTE — SPEECH LANGUAGE PATHOLOGY EVALUATION - SLP PERTINENT HISTORY OF CURRENT PROBLEM
Per charting, 74 year old male with known T4 larynx cancer with extralaryngeal spread, HTN, HLD who presents to the ED with shortness of breath one day prior to his scheduled surgery for laryngeal cancer. Patient underwent laryngectomy, neck dissection, tracheostomy, reconstruction of anterior pharynx with radial forearm flap from left arm, skin graft from left thigh to forearm donor site, thyroidectomy with reimplantation and partial parathyroidectomy on 9/5/19. Endocrine team consulted for post op hypothyroidism and hypoparathyroidism.

## 2019-09-14 LAB
ANION GAP SERPL CALC-SCNC: 14 MMO/L — SIGNIFICANT CHANGE UP (ref 7–14)
BUN SERPL-MCNC: 14 MG/DL — SIGNIFICANT CHANGE UP (ref 7–23)
CALCIUM SERPL-MCNC: 8.1 MG/DL — LOW (ref 8.4–10.5)
CHLORIDE SERPL-SCNC: 96 MMOL/L — LOW (ref 98–107)
CO2 SERPL-SCNC: 28 MMOL/L — SIGNIFICANT CHANGE UP (ref 22–31)
CREAT SERPL-MCNC: 0.94 MG/DL — SIGNIFICANT CHANGE UP (ref 0.5–1.3)
GLUCOSE SERPL-MCNC: 122 MG/DL — HIGH (ref 70–99)
HCT VFR BLD CALC: 32.8 % — LOW (ref 39–50)
HGB BLD-MCNC: 10.8 G/DL — LOW (ref 13–17)
MAGNESIUM SERPL-MCNC: 2 MG/DL — SIGNIFICANT CHANGE UP (ref 1.6–2.6)
MCHC RBC-ENTMCNC: 29.2 PG — SIGNIFICANT CHANGE UP (ref 27–34)
MCHC RBC-ENTMCNC: 32.9 % — SIGNIFICANT CHANGE UP (ref 32–36)
MCV RBC AUTO: 88.6 FL — SIGNIFICANT CHANGE UP (ref 80–100)
NRBC # FLD: 0 K/UL — SIGNIFICANT CHANGE UP (ref 0–0)
PHOSPHATE SERPL-MCNC: 3.5 MG/DL — SIGNIFICANT CHANGE UP (ref 2.5–4.5)
PLATELET # BLD AUTO: 409 K/UL — HIGH (ref 150–400)
PMV BLD: 9.3 FL — SIGNIFICANT CHANGE UP (ref 7–13)
POTASSIUM SERPL-MCNC: 3.9 MMOL/L — SIGNIFICANT CHANGE UP (ref 3.5–5.3)
POTASSIUM SERPL-SCNC: 3.9 MMOL/L — SIGNIFICANT CHANGE UP (ref 3.5–5.3)
RBC # BLD: 3.7 M/UL — LOW (ref 4.2–5.8)
RBC # FLD: 12.9 % — SIGNIFICANT CHANGE UP (ref 10.3–14.5)
SODIUM SERPL-SCNC: 138 MMOL/L — SIGNIFICANT CHANGE UP (ref 135–145)
WBC # BLD: 11.87 K/UL — HIGH (ref 3.8–10.5)
WBC # FLD AUTO: 11.87 K/UL — HIGH (ref 3.8–10.5)

## 2019-09-14 PROCEDURE — 99232 SBSQ HOSP IP/OBS MODERATE 35: CPT

## 2019-09-14 PROCEDURE — 99233 SBSQ HOSP IP/OBS HIGH 50: CPT

## 2019-09-14 RX ADMIN — SIMVASTATIN 20 MILLIGRAM(S): 20 TABLET, FILM COATED ORAL at 21:48

## 2019-09-14 RX ADMIN — Medication 2500 MILLIGRAM(S): at 18:43

## 2019-09-14 RX ADMIN — Medication 137 MICROGRAM(S): at 06:20

## 2019-09-14 RX ADMIN — ENOXAPARIN SODIUM 40 MILLIGRAM(S): 100 INJECTION SUBCUTANEOUS at 12:50

## 2019-09-14 RX ADMIN — Medication 81 MILLIGRAM(S): at 12:50

## 2019-09-14 RX ADMIN — Medication 1 APPLICATION(S): at 12:50

## 2019-09-14 RX ADMIN — AMLODIPINE BESYLATE 10 MILLIGRAM(S): 2.5 TABLET ORAL at 06:20

## 2019-09-14 RX ADMIN — Medication 2500 MILLIGRAM(S): at 06:20

## 2019-09-14 RX ADMIN — Medication 25 MILLIGRAM(S): at 06:20

## 2019-09-14 RX ADMIN — CALCITRIOL 0.25 MICROGRAM(S): 0.5 CAPSULE ORAL at 12:50

## 2019-09-14 NOTE — PROGRESS NOTE ADULT - ASSESSMENT
73 y/o man with HTN, HLD, carotid stenosis laryngeal ca with extralaryngeal spread who presented with SOB now s/p total laryngectomy, b/l neck dissection, total thyroidectomy, with LRFF and STSG for reconstruction.    #Syncope: after BM, while standing up from toilet  - given around time of BM, likely vasovagal syncope  - orthostatics negative  - recommend TTE given ejection murmur to r/o structure heart disease  - fall precautions    #Laryngeal ca: s/p total laryngectomy, b/l neck dissection, total thyroidectomy, with LRFF and STSG for reconstruction on 9/5/19.   - Care per ENT/plastic surgery  - pain currently well controlled  - esophagram without leaks  - SLP eval    #Hypocalcemia:  - continue calcitriol, calcium carbonate  - monitor Ca level    #HTN: continue home medication regimen  - HCTZ 25 mg daily  - amlodipine 10 mg daily  - BP acceptable    #HLD: continue statin    #Carotid stenosis: continue aspirin    #Hypothyroidism: Continue synthroid    #DVT ppx: Lovenox subq

## 2019-09-14 NOTE — PROGRESS NOTE ADULT - SUBJECTIVE AND OBJECTIVE BOX
Patient seen and examined at the bedside. No acute events overnight, pending echo. Tolerating puree diet     NAD, awake and alert  Breathing comfortably on humidified TC   OC/OP: wnl  Neck: soft, flat, Incision c/d/i   LT in place, humidified trach collar  [employer] doppler signal strong  L arm with ACE wrap, wound vac in place and IDA with scant drainage     A/P: 74M s/p TL, TT, bilateral SND, L RFFF, L STSG 9/4   -F/U echo per medicine for syncope work up   -F/U AM Ca  - LT  -IS/CPT  -Diet: puree with thins   -f/u PRS re: wvac  -HME for Darlin tube   -MDdatacor doppler  -OOBTC   -PT/OT  -PM&R   -asa/lov  -synthroid  -max oral Ca repletions

## 2019-09-14 NOTE — PROGRESS NOTE ADULT - PROBLEM SELECTOR PLAN 1
- S/p partial parathyroidectomy and total thyroidectomy along with laryngectomy for laryngeal cancer, now with post op hypoparathyroidism.   - Intact PTH level 2.73 on 9/5/2019 when he was hypocalcemic, now with corrected calcium 8.74  - c/w calcium supplementation with oral calcium carbonate suspension 2500 mg BID  - c/w Calcitriol solution to 0.25 mg q day via NG tube   - Monitor serum calcium, phos qday   - Patient can follow with [facility], prefers near [facility].   Dr. Michelle Dominguez 9/27 8:30AM   [address] [place], [place] [id number] [phone] - S/p partial parathyroidectomy and total thyroidectomy along with laryngectomy for laryngeal cancer, now with post op hypoparathyroidism.   - Intact PTH level 2.73 on 9/5/2019 when he was hypocalcemic, now with corrected calcium 8.74. Given diagnosis of hypoparathyroidism, goal calcium is 8.0-8.5 to prevent hypercalciuria, nephrolithiasis and nephrocalcinosis   - c/w calcium supplementation with oral calcium carbonate suspension 2500 mg BID  - c/w Calcitriol solution to 0.25 mg q day via NG tube   - Monitor serum calcium, phos qday   - Patient can follow with [facility] Endocrinology, prefers near [place].   Dr. Michelle Dominguez 9/27 8:30AM   [address] [place], [place] [id number] [phone]

## 2019-09-14 NOTE — PROGRESS NOTE ADULT - PROBLEM SELECTOR PROBLEM 2
Hypothyroidism, postop

## 2019-09-14 NOTE — PROGRESS NOTE ADULT - PROBLEM SELECTOR PLAN 2
- s/p total thyroidectomy along with laryngectomy and partial parathyroidectomy for laryngeal cancer.   - repeat FT4 0.98 on 9/12, recommend repeat level on 9/16.  - c/w LT4 137 mcg NGT   - repeat TSH in 4-6 weeks.    - If giving bolus feeds, please give Levothyroxine at least 30 minutes prior to bolus feed as it needs to be administered on an empty stomach for maximal absorption. Otherwise, if on po diet, give early in AM.     f/u Dr. Martinez appt above

## 2019-09-14 NOTE — PROGRESS NOTE ADULT - SUBJECTIVE AND OBJECTIVE BOX
Gustavo Sims Jr, MD  page [phone]  Patient is a 74y old  Male who presents with a chief complaint of shortness of breath (14 Sep 2019 05:21)      SUBJECTIVE / OVERNIGHT EVENTS: no new c/o    MEDICATIONS  (STANDING):  amLODIPine   Tablet 10 milliGRAM(s) Oral daily  AQUAPHOR (petrolatum Ointment) 1 Application(s) Topical daily  aspirin  chewable 81 milliGRAM(s) Enteral Tube daily  calcitriol   Capsule 0.25 MICROGram(s) Oral daily  calcium carbonate   Suspension 2500 milliGRAM(s) Oral two times a day  docusate sodium Liquid 100 milliGRAM(s) Oral daily  enoxaparin Injectable 40 milliGRAM(s) SubCutaneous daily  hydrochlorothiazide 25 milliGRAM(s) Oral daily  levothyroxine 137 MICROGram(s) Oral daily  senna Syrup 10 milliLiter(s) Oral at bedtime  simvastatin 20 milliGRAM(s) Oral at bedtime    MEDICATIONS  (PRN):  acetaminophen    Suspension .. 650 milliGRAM(s) Oral every 6 hours PRN Mild Pain (1 - 3)  oxyCODONE    IR 5 milliGRAM(s) Oral every 6 hours PRN Moderate Pain (4 - 6)  oxyCODONE    IR 10 milliGRAM(s) Oral every 6 hours PRN Severe Pain (7 - 10)      Vital Signs Last 24 Hrs  T(C): 37.2 (14 Sep 2019 09:42), Max: 37.2 (13 Sep 2019 14:00)  T(F): 99 (14 Sep 2019 09:42), Max: 99 (13 Sep 2019 21:15)  HR: 97 (14 Sep 2019 09:42) (84 - 97)  BP: 117/57 (14 Sep 2019 09:42) (117/57 - 137/78)  BP(mean): --  RR: 18 (14 Sep 2019 09:42) (17 - 20)  SpO2: 97% (14 Sep 2019 09:42) (93% - 97%)  CAPILLARY BLOOD GLUCOSE        I&O's Summary    13 Sep 2019 07:01  -  14 Sep 2019 07:00  --------------------------------------------------------  IN: 360 mL / OUT: 1215 mL / NET: -855 mL        PHYSICAL EXAM:  GENERAL: NAD, well-developed  HEAD:  Atraumatic, Normocephalic  EYES: EOMI  NECK: Supple, No JVD  CHEST/LUNG: nonlabored breathing, CTAB  HEART: nl S1/S2, +3/6 JAN  ABDOMEN: nondistended, soft  EXTREMITIES:  no LE edema  PSYCH: alert, answering questions appropriately  NEUROLOGY: non-focal  SKIN: No rashes noted      LABS:                        10.8   11.87 )-----------( 409      ( 14 Sep 2019 07:00 )             32.8     09-14    138  |  96<L>  |  14  ----------------------------<  122<H>  3.9   |  28  |  0.94    Ca    8.1<L>      14 Sep 2019 07:00  Phos  3.5     09-14  Mg     2.0     09-14    TPro  6.4  /  Alb  3.2<L>  /  TBili  0.4  /  DBili  x   /  AST  22  /  ALT  14  /  AlkPhos  58  09-13

## 2019-09-14 NOTE — PROGRESS NOTE ADULT - ATTENDING COMMENTS
Prince Naidu MD   Pager # [phone]  On evenings and weekends, please call the office at [phone] or page endocrine fellow on call. Please note that this patient may be followed by different provider tomorrow. If no answer, contact the office.

## 2019-09-14 NOTE — PROGRESS NOTE ADULT - SUBJECTIVE AND OBJECTIVE BOX
Plastic Surgery    SUBJECTIVE:   No acute events overnight. Patient doing well this morning    Vital Signs Last 24 Hrs  T(C): 37.2 (14 Sep 2019 09:42), Max: 37.2 (13 Sep 2019 14:00)  T(F): 99 (14 Sep 2019 09:42), Max: 99 (13 Sep 2019 21:15)  HR: 97 (14 Sep 2019 09:42) (84 - 97)  BP: 117/57 (14 Sep 2019 09:42) (117/57 - 137/78)  BP(mean): --  RR: 18 (14 Sep 2019 09:42) (17 - 20)  SpO2: 97% (14 Sep 2019 09:42) (93% - 97%)      PHYSICAL EXAM:   General: NAD, Lying in bed comfortably  Heent: Neck soft. Cook signal present  Extrem: Forearm skin graft with good take

## 2019-09-14 NOTE — PROGRESS NOTE ADULT - SUBJECTIVE AND OBJECTIVE BOX
Chief Complaint: f/u hypothyroidism and hypocalcemia     History:  Patient feels well at this time. He does not have neck pain. He has some abdominal bloating that comes and goes. Does not have nausea, vomiting, abdominal pain. His corrected calcium today is 8.74. Free T4 on 9/12/2019 was 0.98.    MEDICATIONS  (STANDING):  amLODIPine   Tablet 10 milliGRAM(s) Oral daily  AQUAPHOR (petrolatum Ointment) 1 Application(s) Topical daily  aspirin  chewable 81 milliGRAM(s) Enteral Tube daily  calcitriol   Capsule 0.25 MICROGram(s) Oral daily  calcium carbonate   Suspension 2500 milliGRAM(s) Oral two times a day  docusate sodium Liquid 100 milliGRAM(s) Oral daily  enoxaparin Injectable 40 milliGRAM(s) SubCutaneous daily  hydrochlorothiazide 25 milliGRAM(s) Oral daily  levothyroxine 137 MICROGram(s) Oral daily  senna Syrup 10 milliLiter(s) Oral at bedtime  simvastatin 20 milliGRAM(s) Oral at bedtime    MEDICATIONS  (PRN):  acetaminophen    Suspension .. 650 milliGRAM(s) Oral every 6 hours PRN Mild Pain (1 - 3)  oxyCODONE    IR 5 milliGRAM(s) Oral every 6 hours PRN Moderate Pain (4 - 6)  oxyCODONE    IR 10 milliGRAM(s) Oral every 6 hours PRN Severe Pain (7 - 10)      Allergies  penicillin (Rash)    PHYSICAL EXAM:  VITALS: T(C): 37.2 (09-14-19 @ 09:42)  T(F): 99 (09-14-19 @ 09:42), Max: 99 (09-13-19 @ 21:15)  HR: 97 (09-14-19 @ 09:42) (84 - 97)  BP: 117/57 (09-14-19 @ 09:42) (117/57 - 137/78)  RR:  (17 - 20)  SpO2:  (93% - 97%)  Wt(kg): --  GENERAL: NAD, well-developed  EYES: No proptosis, anicteric  CARDIOVASCULAR: 1+ pitting edema bilateral lower extremities   GI: Soft, nontender, non distended  PSYCH: Alert and oriented x 3, reactive affect      POCT Blood Glucose.: 136 mg/dL (09-12-19 @ 16:00)      09-14    138  |  96<L>  |  14  ----------------------------<  122<H>  3.9   |  28  |  0.94    EGFR if : 93  EGFR if non : 80    Ca    8.1<L>      09-14  Mg     2.0     09-14  Phos  3.5     09-14    TPro  6.4  /  Alb  3.2<L>  /  TBili  0.4  /  DBili  x   /  AST  22  /  ALT  14  /  AlkPhos  58  09-13          Thyroid Function Tests:  09-12 @ 06:30 TSH -- FreeT4 0.98 T3 -- Anti TPO -- Anti Thyroglobulin Ab -- TSI --  09-08 @ 06:35 TSH 3.58 FreeT4 -- T3 68.4 Anti TPO -- Anti Thyroglobulin Ab -- TSI --

## 2019-09-14 NOTE — PROGRESS NOTE ADULT - PROBLEM SELECTOR PROBLEM 1
Hypoparathyroidism after procedure

## 2019-09-14 NOTE — PROGRESS NOTE ADULT - ASSESSMENT
74M  s/p total laryngectomy, b/l neck dissection, total thyroidectomy, with LRFF and STSG for reconstruction.    -cont q4h flap check   -encourage oob/ambo  -care per primary team  -aquaphor to left thigh donor site bID  -xeroform gauze, ace to left forearm QOD

## 2019-09-15 LAB
ANION GAP SERPL CALC-SCNC: 14 MMO/L — SIGNIFICANT CHANGE UP (ref 7–14)
BUN SERPL-MCNC: 15 MG/DL — SIGNIFICANT CHANGE UP (ref 7–23)
CALCIUM SERPL-MCNC: 7.6 MG/DL — LOW (ref 8.4–10.5)
CHLORIDE SERPL-SCNC: 93 MMOL/L — LOW (ref 98–107)
CO2 SERPL-SCNC: 27 MMOL/L — SIGNIFICANT CHANGE UP (ref 22–31)
CREAT SERPL-MCNC: 0.98 MG/DL — SIGNIFICANT CHANGE UP (ref 0.5–1.3)
GLUCOSE SERPL-MCNC: 138 MG/DL — HIGH (ref 70–99)
HCT VFR BLD CALC: 31.8 % — LOW (ref 39–50)
HGB BLD-MCNC: 10.4 G/DL — LOW (ref 13–17)
MAGNESIUM SERPL-MCNC: 1.9 MG/DL — SIGNIFICANT CHANGE UP (ref 1.6–2.6)
MCHC RBC-ENTMCNC: 29.1 PG — SIGNIFICANT CHANGE UP (ref 27–34)
MCHC RBC-ENTMCNC: 32.7 % — SIGNIFICANT CHANGE UP (ref 32–36)
MCV RBC AUTO: 88.8 FL — SIGNIFICANT CHANGE UP (ref 80–100)
NRBC # FLD: 0 K/UL — SIGNIFICANT CHANGE UP (ref 0–0)
PHOSPHATE SERPL-MCNC: 3.5 MG/DL — SIGNIFICANT CHANGE UP (ref 2.5–4.5)
PLATELET # BLD AUTO: 390 K/UL — SIGNIFICANT CHANGE UP (ref 150–400)
PMV BLD: 9.3 FL — SIGNIFICANT CHANGE UP (ref 7–13)
POTASSIUM SERPL-MCNC: 3.4 MMOL/L — LOW (ref 3.5–5.3)
POTASSIUM SERPL-SCNC: 3.4 MMOL/L — LOW (ref 3.5–5.3)
RBC # BLD: 3.58 M/UL — LOW (ref 4.2–5.8)
RBC # FLD: 12.9 % — SIGNIFICANT CHANGE UP (ref 10.3–14.5)
SODIUM SERPL-SCNC: 134 MMOL/L — LOW (ref 135–145)
WBC # BLD: 12.18 K/UL — HIGH (ref 3.8–10.5)
WBC # FLD AUTO: 12.18 K/UL — HIGH (ref 3.8–10.5)

## 2019-09-15 PROCEDURE — 99232 SBSQ HOSP IP/OBS MODERATE 35: CPT

## 2019-09-15 RX ORDER — POTASSIUM CHLORIDE 20 MEQ
40 PACKET (EA) ORAL ONCE
Refills: 0 | Status: COMPLETED | OUTPATIENT
Start: 2019-09-15 | End: 2019-09-15

## 2019-09-15 RX ORDER — MAGNESIUM SULFATE 500 MG/ML
1 VIAL (ML) INJECTION ONCE
Refills: 0 | Status: COMPLETED | OUTPATIENT
Start: 2019-09-15 | End: 2019-09-15

## 2019-09-15 RX ADMIN — Medication 40 MILLIEQUIVALENT(S): at 11:56

## 2019-09-15 RX ADMIN — Medication 100 GRAM(S): at 11:55

## 2019-09-15 RX ADMIN — CALCITRIOL 0.25 MICROGRAM(S): 0.5 CAPSULE ORAL at 11:55

## 2019-09-15 RX ADMIN — Medication 2500 MILLIGRAM(S): at 18:27

## 2019-09-15 RX ADMIN — AMLODIPINE BESYLATE 10 MILLIGRAM(S): 2.5 TABLET ORAL at 05:39

## 2019-09-15 RX ADMIN — SIMVASTATIN 20 MILLIGRAM(S): 20 TABLET, FILM COATED ORAL at 22:44

## 2019-09-15 RX ADMIN — Medication 81 MILLIGRAM(S): at 11:55

## 2019-09-15 RX ADMIN — Medication 2500 MILLIGRAM(S): at 07:36

## 2019-09-15 RX ADMIN — Medication 137 MICROGRAM(S): at 05:39

## 2019-09-15 RX ADMIN — ENOXAPARIN SODIUM 40 MILLIGRAM(S): 100 INJECTION SUBCUTANEOUS at 11:56

## 2019-09-15 RX ADMIN — Medication 25 MILLIGRAM(S): at 05:39

## 2019-09-15 RX ADMIN — Medication 1 APPLICATION(S): at 11:56

## 2019-09-15 RX ADMIN — Medication 100 MILLIGRAM(S): at 11:55

## 2019-09-15 NOTE — PROGRESS NOTE ADULT - SUBJECTIVE AND OBJECTIVE BOX
Plastic Surgery Progress Note    S: Patient seen and examined.    Vital Signs Last 24 Hrs  T(C): 37.4 (15 Sep 2019 05:24), Max: 37.4 (15 Sep 2019 05:24)  T(F): 99.3 (15 Sep 2019 05:24), Max: 99.3 (15 Sep 2019 05:24)  HR: 89 (15 Sep 2019 05:24) (84 - 97)  BP: 130/62 (15 Sep 2019 05:24) (117/57 - 135/60)  BP(mean): --  RR: 16 (15 Sep 2019 05:24) (16 - 18)  SpO2: 97% (15 Sep 2019 05:24) (94% - 98%)  I&O's Detail    14 Sep 2019 07:01  -  15 Sep 2019 07:00  --------------------------------------------------------  IN:    Oral Fluid: 1020 mL  Total IN: 1020 mL    OUT:    Bulb: 2 mL    Voided: 1500 mL  Total OUT: 1502 mL    Total NET: -482 mL          Physical Exam:  General: Awake and alert, NAD  HEENT: cook signal+, neck incision intact, minimal swelling  L arm: dressing CDI

## 2019-09-15 NOTE — PROGRESS NOTE ADULT - SUBJECTIVE AND OBJECTIVE BOX
Patient seen and examined at the bedside. No acute events overnight, pending echo     Passed esophogram: tolerating puree diet.    NAD, awake and alert  Breathing comfortably on humidified TC   OC/OP: wnl  Neck: soft, flat, Incision c/d/i   LT in place, humidified trach collar  [employer] doppler signal strong  L arm with ACE wrap, wound vac in place and IDA with scant drainage     A/P: 74M s/p TL, TT, bilateral SND, L RFFF, L STSG 9/4   -F/U echo per medicine for syncope work up   -F/U AM labs  - LT  -IS/CPT  -Diet: puree with thins   -f/u PRS re: wvac  -HME for Darlin tube   -[employer] doppler  -OOBTC   -PT/OT  -PM&R   -asa/lov  -synthroid  -max oral Ca repletions

## 2019-09-15 NOTE — PROGRESS NOTE ADULT - SUBJECTIVE AND OBJECTIVE BOX
Gustavo Sims Jr, MD  page [phone]  Patient is a 74y old  Male who presents with a chief complaint of shortness of breath (15 Sep 2019 08:29)      SUBJECTIVE / OVERNIGHT EVENTS:    MEDICATIONS  (STANDING):  amLODIPine   Tablet 10 milliGRAM(s) Oral daily  AQUAPHOR (petrolatum Ointment) 1 Application(s) Topical daily  aspirin  chewable 81 milliGRAM(s) Enteral Tube daily  calcitriol   Capsule 0.25 MICROGram(s) Oral daily  calcium carbonate   Suspension 2500 milliGRAM(s) Oral two times a day  docusate sodium Liquid 100 milliGRAM(s) Oral daily  enoxaparin Injectable 40 milliGRAM(s) SubCutaneous daily  hydrochlorothiazide 25 milliGRAM(s) Oral daily  levothyroxine 137 MICROGram(s) Oral daily  senna Syrup 10 milliLiter(s) Oral at bedtime  simvastatin 20 milliGRAM(s) Oral at bedtime    MEDICATIONS  (PRN):  acetaminophen    Suspension .. 650 milliGRAM(s) Oral every 6 hours PRN Mild Pain (1 - 3)  oxyCODONE    IR 5 milliGRAM(s) Oral every 6 hours PRN Moderate Pain (4 - 6)  oxyCODONE    IR 10 milliGRAM(s) Oral every 6 hours PRN Severe Pain (7 - 10)      Vital Signs Last 24 Hrs  T(C): 37 (15 Sep 2019 11:37), Max: 37.4 (15 Sep 2019 05:24)  T(F): 98.6 (15 Sep 2019 11:37), Max: 99.3 (15 Sep 2019 05:24)  HR: 88 (15 Sep 2019 11:37) (84 - 94)  BP: 124/56 (15 Sep 2019 11:37) (124/56 - 135/60)  BP(mean): --  RR: 16 (15 Sep 2019 11:37) (16 - 18)  SpO2: 96% (15 Sep 2019 11:37) (95% - 98%)  CAPILLARY BLOOD GLUCOSE        I&O's Summary    14 Sep 2019 07:01  -  15 Sep 2019 07:00  --------------------------------------------------------  IN: 1020 mL / OUT: 1502 mL / NET: -482 mL        PHYSICAL EXAM:  GENERAL: NAD, well-developed  HEAD:  Atraumatic, Normocephalic  EYES: EOMI  NECK: Supple, No JVD  CHEST/LUNG: nonlabored breathing, CTAB  HEART: nl S1/S2, +3/6 JAN  ABDOMEN: nondistended, soft  EXTREMITIES:  no LE edema  PSYCH: alert, answering questions appropriately  NEUROLOGY: non-focal  SKIN: No rashes noted    LABS:                        10.4   12.18 )-----------( 390      ( 15 Sep 2019 06:30 )             31.8     09-15    134<L>  |  93<L>  |  15  ----------------------------<  138<H>  3.4<L>   |  27  |  0.98    Ca    7.6<L>      15 Sep 2019 06:30  Phos  3.5     09-15  Mg     1.9     09-15          Consultant(s) Notes Reviewed:  endocrinology    Care Discussed with Consultants/Other Providers: ENT

## 2019-09-15 NOTE — PROGRESS NOTE ADULT - ASSESSMENT
75 y/o man with HTN, HLD, carotid stenosis laryngeal ca with extralaryngeal spread who presented with SOB now s/p total laryngectomy, b/l neck dissection, total thyroidectomy, with LRFF and STSG for reconstruction.    #Syncope: after BM, while standing up from toilet  - given around time of BM, likely vasovagal syncope  - orthostatics negative  - recommend TTE given ejection murmur to r/o structure heart disease  - fall precautions    #Laryngeal ca: s/p total laryngectomy, b/l neck dissection, total thyroidectomy, with LRFF and STSG for reconstruction on 9/5/19.   - Care per ENT/plastic surgery  - pain currently well controlled  - esophagram without leaks  - SLP eval    #Hypocalcemia:  - appreciate endocrine recs  - continue calcitriol, calcium carbonate  - monitor Ca level    #HTN: continue home medication regimen  - HCTZ 25 mg daily  - amlodipine 10 mg daily  - BP acceptable    #HLD: continue statin    #Carotid stenosis: continue aspirin    #Hypothyroidism: Continue synthroid    #DVT ppx: Lovenox subq

## 2019-09-15 NOTE — PROGRESS NOTE ADULT - ASSESSMENT
74M  s/p total laryngectomy, b/l neck dissection, total thyroidectomy, with LRFF and STSG for reconstruction.    -cont q4h flap check   -encourage oob/ambo  -care per primary team  -aquaphor to left thigh donor site bID  -xeroform gauze, ace to left forearm QOD by PRS    [id number]

## 2019-09-16 LAB
ANION GAP SERPL CALC-SCNC: 14 MMO/L — SIGNIFICANT CHANGE UP (ref 7–14)
BUN SERPL-MCNC: 16 MG/DL — SIGNIFICANT CHANGE UP (ref 7–23)
CALCIUM SERPL-MCNC: 8.4 MG/DL — SIGNIFICANT CHANGE UP (ref 8.4–10.5)
CHLORIDE SERPL-SCNC: 93 MMOL/L — LOW (ref 98–107)
CO2 SERPL-SCNC: 29 MMOL/L — SIGNIFICANT CHANGE UP (ref 22–31)
CREAT SERPL-MCNC: 1 MG/DL — SIGNIFICANT CHANGE UP (ref 0.5–1.3)
GLUCOSE SERPL-MCNC: 127 MG/DL — HIGH (ref 70–99)
HCT VFR BLD CALC: 31.6 % — LOW (ref 39–50)
HGB BLD-MCNC: 10.4 G/DL — LOW (ref 13–17)
MCHC RBC-ENTMCNC: 29.2 PG — SIGNIFICANT CHANGE UP (ref 27–34)
MCHC RBC-ENTMCNC: 32.9 % — SIGNIFICANT CHANGE UP (ref 32–36)
MCV RBC AUTO: 88.8 FL — SIGNIFICANT CHANGE UP (ref 80–100)
NRBC # FLD: 0 K/UL — SIGNIFICANT CHANGE UP (ref 0–0)
PLATELET # BLD AUTO: 421 K/UL — HIGH (ref 150–400)
PMV BLD: 9 FL — SIGNIFICANT CHANGE UP (ref 7–13)
POTASSIUM SERPL-MCNC: 3.6 MMOL/L — SIGNIFICANT CHANGE UP (ref 3.5–5.3)
POTASSIUM SERPL-SCNC: 3.6 MMOL/L — SIGNIFICANT CHANGE UP (ref 3.5–5.3)
RBC # BLD: 3.56 M/UL — LOW (ref 4.2–5.8)
RBC # FLD: 12.7 % — SIGNIFICANT CHANGE UP (ref 10.3–14.5)
SODIUM SERPL-SCNC: 136 MMOL/L — SIGNIFICANT CHANGE UP (ref 135–145)
SURGICAL PATHOLOGY STUDY: SIGNIFICANT CHANGE UP
WBC # BLD: 10.45 K/UL — SIGNIFICANT CHANGE UP (ref 3.8–10.5)
WBC # FLD AUTO: 10.45 K/UL — SIGNIFICANT CHANGE UP (ref 3.8–10.5)

## 2019-09-16 PROCEDURE — 93970 EXTREMITY STUDY: CPT | Mod: 26

## 2019-09-16 PROCEDURE — 93306 TTE W/DOPPLER COMPLETE: CPT | Mod: 26

## 2019-09-16 PROCEDURE — 99232 SBSQ HOSP IP/OBS MODERATE 35: CPT

## 2019-09-16 RX ADMIN — ENOXAPARIN SODIUM 40 MILLIGRAM(S): 100 INJECTION SUBCUTANEOUS at 11:45

## 2019-09-16 RX ADMIN — SENNA PLUS 10 MILLILITER(S): 8.6 TABLET ORAL at 21:49

## 2019-09-16 RX ADMIN — Medication 100 MILLIGRAM(S): at 11:44

## 2019-09-16 RX ADMIN — Medication 2500 MILLIGRAM(S): at 07:43

## 2019-09-16 RX ADMIN — Medication 81 MILLIGRAM(S): at 11:44

## 2019-09-16 RX ADMIN — Medication 2500 MILLIGRAM(S): at 18:05

## 2019-09-16 RX ADMIN — Medication 137 MICROGRAM(S): at 06:02

## 2019-09-16 RX ADMIN — Medication 25 MILLIGRAM(S): at 06:02

## 2019-09-16 RX ADMIN — SIMVASTATIN 20 MILLIGRAM(S): 20 TABLET, FILM COATED ORAL at 21:49

## 2019-09-16 RX ADMIN — Medication 1 APPLICATION(S): at 11:44

## 2019-09-16 RX ADMIN — CALCITRIOL 0.25 MICROGRAM(S): 0.5 CAPSULE ORAL at 11:44

## 2019-09-16 RX ADMIN — AMLODIPINE BESYLATE 10 MILLIGRAM(S): 2.5 TABLET ORAL at 06:02

## 2019-09-16 NOTE — PROGRESS NOTE ADULT - SUBJECTIVE AND OBJECTIVE BOX
CHIEF COMPLAINT: Patient is a 74y old  Male who presents with a chief complaint of shortness of breath    SUBJECTIVE / OVERNIGHT EVENTS: Denies chest pain, SOB, N/V/D. Ambulates without difficulty at home.    MEDICATIONS  (STANDING):  amLODIPine   Tablet 10 milliGRAM(s) Oral daily  AQUAPHOR (petrolatum Ointment) 1 Application(s) Topical daily  aspirin  chewable 81 milliGRAM(s) Enteral Tube daily  calcitriol   Capsule 0.25 MICROGram(s) Oral daily  calcium carbonate   Suspension 2500 milliGRAM(s) Oral two times a day  docusate sodium Liquid 100 milliGRAM(s) Oral daily  enoxaparin Injectable 40 milliGRAM(s) SubCutaneous daily  hydrochlorothiazide 25 milliGRAM(s) Oral daily  levothyroxine 137 MICROGram(s) Oral daily  senna Syrup 10 milliLiter(s) Oral at bedtime  simvastatin 20 milliGRAM(s) Oral at bedtime    MEDICATIONS  (PRN):  acetaminophen    Suspension .. 650 milliGRAM(s) Oral every 6 hours PRN Mild Pain (1 - 3)  oxyCODONE    IR 5 milliGRAM(s) Oral every 6 hours PRN Moderate Pain (4 - 6)  oxyCODONE    IR 10 milliGRAM(s) Oral every 6 hours PRN Severe Pain (7 - 10)      VITALS:  T(F): 99 (09-16-19 @ 10:00), Max: 99.6 (09-16-19 @ 03:03)  HR: 90 (09-16-19 @ 10:00) (84 - 90)  BP: 145/57 (09-16-19 @ 10:00) (113/54 - 145/57)  RR: 19 (09-16-19 @ 10:00) (16 - 20)  SpO2: 95% (09-16-19 @ 10:00)  Wt(kg): --      CAPILLARY BLOOD GLUCOSE      PHYSICAL EXAM:  GENERAL: NAD, on room air  HEENT: sclera clear, right side neck staples c/d/i, + trach capped  NECK: Supple, No JVD  CHEST/LUNG: Clear to auscultation bilaterally; No wheeze  HEART: s1/s2, +3/6 JAN  ABDOMEN: Soft, Nontender, Nondistended; Bowel sounds present  EXTREMITIES:  WWP, + pitting edema b/l legs, nontender  PSYCH: Awake, alert, responds to Qs appropriately with writing  NEUROLOGY: non-focal  SKIN: No rashes or lesions    LABS:              10.4                 134  | 27   | 15           12.18 >-----------< 390     ------------------------< 138                   31.8                 3.4  | 93   | 0.98                                         Ca 7.6   Mg 1.9   Ph 3.5                        MICROBIOLOGY:        RADIOLOGY & ADDITIONAL TESTS:  Imaging Personally Reviewed: [ ] Yes    [ ] Consultant(s) Notes Reviewed:  [x] Care Discussed with Consultants/Other Providers:

## 2019-09-16 NOTE — PROGRESS NOTE ADULT - ASSESSMENT
73 y/o man with HTN, HLD, carotid stenosis laryngeal ca with extralaryngeal spread who presented with SOB. Now s/p total laryngectomy, b/l neck dissection, total thyroidectomy, with LRFF and STSG for reconstruction.    #Syncope: after BM, while standing up from toilet  - given around time of BM, likely vasovagal syncope  - orthostatics negative  - awaiting TTE given ejection murmur to r/o structural heart disease and given LE edema  - fall precautions    #Laryngeal ca: s/p total laryngectomy, b/l neck dissection, total thyroidectomy, with LRFF and STSG for reconstruction on 9/5/19.   - Care per ENT/plastic surgery  - pain currently well controlled  - esophagram without leaks  - tolerating soft diet    # LE pitting edema b/l  - check LE US r/o DVT given malignancy  - awaiting TTE eval for HF  - keep legs elevated above level of heart    #Hypocalcemia:  - Endo following  - continue calcitriol, calcium carbonate  - monitor Ca level and ionized calcium levels    #HTN: continue home medication regimen, BP controlled  - c/w HCTZ 25 mg daily, amlodipine 10 mg daily    #HLD: continue statin    #Carotid stenosis: continue aspirin    #Hypothyroidism: Continue synthroid    #DVT ppx: Lovenox subq

## 2019-09-16 NOTE — PROGRESS NOTE ADULT - SUBJECTIVE AND OBJECTIVE BOX
Patient seen and examined at the bedside. No acute events overnight, pending echo     Passed esophogram: tolerating puree diet.    NAD, awake and alert  Breathing comfortably on humidified TC   OC/OP: wnl  Neck: soft, flat, Incision c/d/i   LT in place, humidified trach collar  [employer] doppler signal strong  L arm with ACE wrap, wound vac in place and IDA with scant drainage     A/P: 74M s/p TL, TT, bilateral SND, L RFFF, L STSG 9/4   -F/U echo per medicine for syncope work up  -F/U AM labs  - LT  -IS/CPT  -Diet: puree with thins   -f/u PRS re: wvac  -HME for Darlin tube   -[employer] doppler  -OOBTC   -PT/OT  -PM&R   -asa/lov  -synthroid  -max oral Ca repletions

## 2019-09-16 NOTE — PROGRESS NOTE ADULT - SUBJECTIVE AND OBJECTIVE BOX
Plastic Surgery Progress Note    S: Patient seen and examined. patient NAD, doing well in bed.     O:    ICU Vital Signs Last 24 Hrs  T(C): 37.5 (16 Sep 2019 06:00), Max: 37.6 (16 Sep 2019 03:03)  T(F): 99.5 (16 Sep 2019 06:00), Max: 99.6 (16 Sep 2019 03:03)  HR: 84 (16 Sep 2019 06:00) (84 - 90)  BP: 128/55 (16 Sep 2019 06:00) (113/54 - 136/53)  BP(mean): --  ABP: --  ABP(mean): --  RR: 16 (16 Sep 2019 06:00) (16 - 20)  SpO2: 92% (16 Sep 2019 06:00) (92% - 99%)    I&O's Detail    15 Sep 2019 07:01  -  16 Sep 2019 07:00  --------------------------------------------------------  IN:    IV PiggyBack: 100 mL    Oral Fluid: 240 mL  Total IN: 340 mL    OUT:    Bulb: 12.5 mL    Voided: 1100 mL  Total OUT: 1112.5 mL    Total NET: -772.5 mL          Physical Exam:  General: Awake and alert, NAD  HEENT: cook signal+, neck incision intact, minimal swelling  L arm: dressing CDI, changed today, Drain D/C'd

## 2019-09-16 NOTE — PROGRESS NOTE ADULT - ASSESSMENT
74M  s/p total laryngectomy, b/l neck dissection, total thyroidectomy, with LRFF and STSG for reconstruction.    -cont q4h flap check   -encourage oob/ambo  -care per primary team  -aquaphor to left thigh donor site bID  - drain removed at bedside during AM rounds.   -xeroform gauze, ace to left forearm QOD by PRS    [id number]

## 2019-09-17 ENCOUNTER — TRANSCRIPTION ENCOUNTER (OUTPATIENT)
Age: 74
End: 2019-09-17

## 2019-09-17 ENCOUNTER — INBOUND DOCUMENT (OUTPATIENT)
Age: 74
End: 2019-09-17

## 2019-09-17 VITALS
OXYGEN SATURATION: 93 % | TEMPERATURE: 98 F | SYSTOLIC BLOOD PRESSURE: 128 MMHG | RESPIRATION RATE: 18 BRPM | HEART RATE: 72 BPM | DIASTOLIC BLOOD PRESSURE: 52 MMHG

## 2019-09-17 LAB
ALBUMIN SERPL ELPH-MCNC: 3.2 G/DL — LOW (ref 3.3–5)
ALP SERPL-CCNC: 60 U/L — SIGNIFICANT CHANGE UP (ref 40–120)
ALT FLD-CCNC: 11 U/L — SIGNIFICANT CHANGE UP (ref 4–41)
ANION GAP SERPL CALC-SCNC: 11 MMO/L — SIGNIFICANT CHANGE UP (ref 7–14)
ANION GAP SERPL CALC-SCNC: 11 MMO/L — SIGNIFICANT CHANGE UP (ref 7–14)
AST SERPL-CCNC: 15 U/L — SIGNIFICANT CHANGE UP (ref 4–40)
BILIRUB SERPL-MCNC: 0.4 MG/DL — SIGNIFICANT CHANGE UP (ref 0.2–1.2)
BUN SERPL-MCNC: 12 MG/DL — SIGNIFICANT CHANGE UP (ref 7–23)
BUN SERPL-MCNC: 12 MG/DL — SIGNIFICANT CHANGE UP (ref 7–23)
CALCIUM SERPL-MCNC: 8 MG/DL — LOW (ref 8.4–10.5)
CALCIUM SERPL-MCNC: 8 MG/DL — LOW (ref 8.4–10.5)
CHLORIDE SERPL-SCNC: 94 MMOL/L — LOW (ref 98–107)
CHLORIDE SERPL-SCNC: 94 MMOL/L — LOW (ref 98–107)
CO2 SERPL-SCNC: 31 MMOL/L — SIGNIFICANT CHANGE UP (ref 22–31)
CO2 SERPL-SCNC: 31 MMOL/L — SIGNIFICANT CHANGE UP (ref 22–31)
CREAT SERPL-MCNC: 0.94 MG/DL — SIGNIFICANT CHANGE UP (ref 0.5–1.3)
CREAT SERPL-MCNC: 0.94 MG/DL — SIGNIFICANT CHANGE UP (ref 0.5–1.3)
GLUCOSE SERPL-MCNC: 136 MG/DL — HIGH (ref 70–99)
GLUCOSE SERPL-MCNC: 136 MG/DL — HIGH (ref 70–99)
HCT VFR BLD CALC: 31.9 % — LOW (ref 39–50)
HGB BLD-MCNC: 10.2 G/DL — LOW (ref 13–17)
MAGNESIUM SERPL-MCNC: 1.8 MG/DL — SIGNIFICANT CHANGE UP (ref 1.6–2.6)
MCHC RBC-ENTMCNC: 28.7 PG — SIGNIFICANT CHANGE UP (ref 27–34)
MCHC RBC-ENTMCNC: 32 % — SIGNIFICANT CHANGE UP (ref 32–36)
MCV RBC AUTO: 89.9 FL — SIGNIFICANT CHANGE UP (ref 80–100)
NRBC # FLD: 0.06 K/UL — SIGNIFICANT CHANGE UP (ref 0–0)
PHOSPHATE SERPL-MCNC: 3.7 MG/DL — SIGNIFICANT CHANGE UP (ref 2.5–4.5)
PLATELET # BLD AUTO: 405 K/UL — HIGH (ref 150–400)
PMV BLD: 9.2 FL — SIGNIFICANT CHANGE UP (ref 7–13)
POTASSIUM SERPL-MCNC: 3.3 MMOL/L — LOW (ref 3.5–5.3)
POTASSIUM SERPL-MCNC: 3.3 MMOL/L — LOW (ref 3.5–5.3)
POTASSIUM SERPL-SCNC: 3.3 MMOL/L — LOW (ref 3.5–5.3)
POTASSIUM SERPL-SCNC: 3.3 MMOL/L — LOW (ref 3.5–5.3)
PROT SERPL-MCNC: 6.8 G/DL — SIGNIFICANT CHANGE UP (ref 6–8.3)
RBC # BLD: 3.55 M/UL — LOW (ref 4.2–5.8)
RBC # FLD: 13 % — SIGNIFICANT CHANGE UP (ref 10.3–14.5)
SODIUM SERPL-SCNC: 136 MMOL/L — SIGNIFICANT CHANGE UP (ref 135–145)
SODIUM SERPL-SCNC: 136 MMOL/L — SIGNIFICANT CHANGE UP (ref 135–145)
T4 FREE SERPL-MCNC: 1.49 NG/DL — SIGNIFICANT CHANGE UP (ref 0.9–1.8)
WBC # BLD: 8.87 K/UL — SIGNIFICANT CHANGE UP (ref 3.8–10.5)
WBC # FLD AUTO: 8.87 K/UL — SIGNIFICANT CHANGE UP (ref 3.8–10.5)

## 2019-09-17 PROCEDURE — 99232 SBSQ HOSP IP/OBS MODERATE 35: CPT

## 2019-09-17 RX ORDER — PETROLATUM,WHITE
1 JELLY (GRAM) TOPICAL
Qty: 1 | Refills: 0
Start: 2019-09-17 | End: 2019-09-23

## 2019-09-17 RX ORDER — PETROLATUM,WHITE
1 JELLY (GRAM) TOPICAL THREE TIMES A DAY
Refills: 0 | Status: DISCONTINUED | OUTPATIENT
Start: 2019-09-17 | End: 2019-09-17

## 2019-09-17 RX ADMIN — CALCITRIOL 0.25 MICROGRAM(S): 0.5 CAPSULE ORAL at 13:10

## 2019-09-17 RX ADMIN — ENOXAPARIN SODIUM 40 MILLIGRAM(S): 100 INJECTION SUBCUTANEOUS at 13:10

## 2019-09-17 RX ADMIN — Medication 1 APPLICATION(S): at 13:12

## 2019-09-17 RX ADMIN — Medication 2500 MILLIGRAM(S): at 05:29

## 2019-09-17 RX ADMIN — AMLODIPINE BESYLATE 10 MILLIGRAM(S): 2.5 TABLET ORAL at 05:29

## 2019-09-17 RX ADMIN — Medication 81 MILLIGRAM(S): at 13:10

## 2019-09-17 RX ADMIN — Medication 25 MILLIGRAM(S): at 05:29

## 2019-09-17 RX ADMIN — Medication 137 MICROGRAM(S): at 05:29

## 2019-09-17 NOTE — PROGRESS NOTE ADULT - SUBJECTIVE AND OBJECTIVE BOX
Patient seen and examined at the bedside. No acute events overnight, echo and US doppler both done and results wnl. WBC stable.    Passed esophogram: tolerating soft diet.    NAD, awake and alert  Breathing comfortably on humidified TC   OC/OP: wnl  Neck: soft, flat, Incision c/d/i   LT in place, humidified trach collar  [employer] doppler signal strong  L arm with ACE wrap    A/P: 74M s/p TL, TT, bilateral SND, L RFFF, L STSG 9/4   -F/U AM labs  - LT  -IS/CPT  -Diet: soft with thins   -HME for Darlin tube   -[employer] doppler  -OOBTC   -PT/OT – reeval for discharge recs  -PM&R   -asa/lov  -synthroid  -max oral Ca repletions

## 2019-09-17 NOTE — PROGRESS NOTE ADULT - REASON FOR ADMISSION

## 2019-09-17 NOTE — PROGRESS NOTE ADULT - ASSESSMENT
73 y/o man with HTN, HLD, carotid stenosis laryngeal ca with extralaryngeal spread who presented with SOB. Now s/p total laryngectomy, b/l neck dissection, total thyroidectomy, with LRFF and STSG for reconstruction.    #Syncope: after BM, while standing up from toilet  - given around time of BM, likely vasovagal syncope  - orthostatics negative  - TTE unremarkable with preserved EF and normal valves  - fall precautions    #Laryngeal ca: s/p total laryngectomy, b/l neck dissection, total thyroidectomy, with LRFF and STSG for reconstruction on 9/5/19.   - Care per ENT/plastic surgery  - pain currently well controlled  - esophagram without leaks  - tolerating soft diet    # LE pitting edema b/l  - LE US neg for DVT b/l  - TTE unremarkable  - keep legs elevated above level of heart, give compression stockings    #Hypocalcemia:  - Endo following  - continue calcitriol, calcium carbonate  - monitor Ca level and ionized calcium levels  - supplement potassium levels for goal K: 4.0-4.5    #HTN: continue home medication regimen, BP controlled  - c/w HCTZ 25 mg daily, amlodipine 10 mg daily    #HLD: continue statin    #Carotid stenosis: continue aspirin    #Hypothyroidism: Continue synthroid    #DVT ppx: Lovenox subq  PT eval

## 2019-09-17 NOTE — DISCHARGE NOTE NURSING/CASE MANAGEMENT/SOCIAL WORK - NSDCPNDISPN_GEN_ALL_CORE
Activities of daily living, including home environment that might     exacerbate pain or reduce effectiveness of the pain management plan of care as well as strategies to address these issues/Opioids not applicable/not prescribed

## 2019-09-17 NOTE — PROGRESS NOTE ADULT - PROVIDER SPECIALTY LIST ADULT
Anesthesia
ENT
Endocrinology
Hospitalist
Plastic Surgery
SICU
SICU
ENT
Endocrinology
Hospitalist
Endocrinology

## 2019-09-17 NOTE — PROGRESS NOTE ADULT - ASSESSMENT
74M  s/p total laryngectomy, b/l neck dissection, total thyroidectomy, with LRFF and STSG for reconstruction.    -cont q4h flap check   -encourage oob/ambo  -care per primary team  -aquaphor to left thigh donor site bID  - drain removed at bedside during AM rounds.   -xeroform gauze, ace to left forearm QOD by PRS    [id number] 74M  s/p total laryngectomy, b/l neck dissection, total thyroidectomy, with LRFF and STSG for reconstruction.    -cont q4h flap check   -encourage oob/ambo  -care per primary team  -aquaphor to left thigh donor site and forearm recipient site BID-TID as needed.       [id number]

## 2019-09-17 NOTE — PROGRESS NOTE ADULT - SUBJECTIVE AND OBJECTIVE BOX
Plastic Surgery Progress Note    S: Patient seen and examined. patient NAD, doing well in bed.     O:    ICU Vital Signs Last 24 Hrs  T(C): 37.1 (17 Sep 2019 05:25), Max: 37.3 (16 Sep 2019 21:43)  T(F): 98.8 (17 Sep 2019 05:25), Max: 99.1 (16 Sep 2019 21:43)  HR: 91 (17 Sep 2019 05:25) (84 - 91)  BP: 136/69 (17 Sep 2019 05:25) (108/70 - 145/57)  BP(mean): --  ABP: --  ABP(mean): --  RR: 17 (17 Sep 2019 05:25) (17 - 19)  SpO2: 96% (17 Sep 2019 05:25) (94% - 99%)    I&O's Detail    16 Sep 2019 07:01  -  17 Sep 2019 07:00  --------------------------------------------------------  IN:    Oral Fluid: 160 mL  Total IN: 160 mL    OUT:    Voided: 1600 mL  Total OUT: 1600 mL    Total NET: -1440 mL        Physical Exam:  General: Awake and alert, NAD  HEENT: cook signal+, neck incision intact, minimal swelling  L arm: Dressing taken down today, apply aquaphor to graft recipient site and leave open to air.

## 2019-09-17 NOTE — PROGRESS NOTE ADULT - SUBJECTIVE AND OBJECTIVE BOX
CHIEF COMPLAINT: Patient is a 74y old  Male who presents with a chief complaint of shortness of breath    SUBJECTIVE / OVERNIGHT EVENTS: No events overnight. No complaints of pain, SOB. Having BMs    MEDICATIONS  (STANDING):  amLODIPine   Tablet 10 milliGRAM(s) Oral daily  AQUAPHOR (petrolatum Ointment) 1 Application(s) Topical daily  AQUAPHOR (petrolatum Ointment) 1 Application(s) Topical three times a day  aspirin  chewable 81 milliGRAM(s) Enteral Tube daily  calcitriol   Capsule 0.25 MICROGram(s) Oral daily  calcium carbonate   Suspension 2500 milliGRAM(s) Oral two times a day  docusate sodium Liquid 100 milliGRAM(s) Oral daily  enoxaparin Injectable 40 milliGRAM(s) SubCutaneous daily  hydrochlorothiazide 25 milliGRAM(s) Oral daily  levothyroxine 137 MICROGram(s) Oral daily  senna Syrup 10 milliLiter(s) Oral at bedtime  simvastatin 20 milliGRAM(s) Oral at bedtime    MEDICATIONS  (PRN):  acetaminophen    Suspension .. 650 milliGRAM(s) Oral every 6 hours PRN Mild Pain (1 - 3)  oxyCODONE    IR 5 milliGRAM(s) Oral every 6 hours PRN Moderate Pain (4 - 6)  oxyCODONE    IR 10 milliGRAM(s) Oral every 6 hours PRN Severe Pain (7 - 10)      VITALS:  T(F): 98.2 (09-17-19 @ 10:00), Max: 99.1 (09-16-19 @ 21:43)  HR: 72 (09-17-19 @ 10:00) (72 - 91)  BP: 128/52 (09-17-19 @ 10:00) (108/70 - 144/53)  RR: 18 (09-17-19 @ 10:00) (17 - 18)  SpO2: 93% (09-17-19 @ 10:00)  Wt(kg): --      CAPILLARY BLOOD GLUCOSE      PHYSICAL EXAM:  GENERAL: NAD, on room air  HEENT: sclera clear, right side neck staples c/d/i, + trach capped  CHEST/LUNG: Clear to auscultation bilaterally; No wheeze  HEART: s1/s2, RRR  ABDOMEN: Soft, Nontender, Nondistended; Bowel sounds present  EXTREMITIES:  WWP, + pitting edema b/l legs, nontender, left arm wound C/D/I open to air  PSYCH: Awake, alert, responds to Qs appropriately with writing  NEUROLOGY: non-focal  SKIN: No rashes or lesions    LABS:              10.2                 136  | 31   | 12           8.87  >-----------< 405     ------------------------< 136                   31.9                 3.3  | 94   | 0.94                                         Ca 8.0   Mg 1.8   Ph 3.7         TPro  6.8  /  Alb  3.2      TBili  0.4  /  DBili  x         AST  15  /  ALT  11            AlkPhos  60        MICROBIOLOGY:    RADIOLOGY & ADDITIONAL TESTS:  Imaging Personally Reviewed: [ ] Yes    [ ] Consultant(s) Notes Reviewed:  [x] Care Discussed with Consultants/Other Providers:

## 2019-09-17 NOTE — DISCHARGE NOTE NURSING/CASE MANAGEMENT/SOCIAL WORK - PATIENT PORTAL LINK FT
You can access the FollowMyHealth Patient Portal offered by [facility] by registering at the following website: [email or web address]. By joining [facility]’s FollowMyHealth portal, you will also be able to view your health information using other applications (apps) compatible with our system.

## 2019-09-24 ENCOUNTER — APPOINTMENT (OUTPATIENT)
Dept: OTOLARYNGOLOGY | Facility: CLINIC | Age: 74
End: 2019-09-24
Payer: MEDICARE

## 2019-09-24 VITALS
SYSTOLIC BLOOD PRESSURE: 146 MMHG | DIASTOLIC BLOOD PRESSURE: 72 MMHG | OXYGEN SATURATION: 86 % | WEIGHT: 189.5 LBS | BODY MASS INDEX: 27.13 KG/M2 | HEIGHT: 70 IN

## 2019-09-24 PROCEDURE — 99024 POSTOP FOLLOW-UP VISIT: CPT

## 2019-09-24 NOTE — CONSULT LETTER
[Dear  ___] : Dear  [unfilled], [Courtesy Letter:] : I had the pleasure of seeing your patient, [unfilled], in my office today. [Please see my note below.] : Please see my note below. [Sincerely,] : Sincerely, [FreeTextEntry2] : Pipo Leon MD ([place], [place]) [FreeTextEntry3] : Kel Johnston MD

## 2019-09-24 NOTE — HISTORY OF PRESENT ILLNESS
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 Pt seen by  for consult switching to  to decrease travel. Pt eating full diet, small bite sizes. Denies dysphagia, pain or discomfort. Pt needs dental appt for clearance. Pt taking levothyroxine, calcitrol .25 mg BID and Tums 2500 daily.

## 2019-09-24 NOTE — REASON FOR VISIT
[Subsequent Evaluation] : a subsequent evaluation for [FreeTextEntry2] : 9/5 s/p total laryngectomy, neck dissection, total thyroidectomy

## 2019-09-27 ENCOUNTER — LABORATORY RESULT (OUTPATIENT)
Age: 74
End: 2019-09-27

## 2019-09-27 ENCOUNTER — APPOINTMENT (OUTPATIENT)
Dept: ENDOCRINOLOGY | Facility: CLINIC | Age: 74
End: 2019-09-27
Payer: MEDICARE

## 2019-09-27 VITALS
HEIGHT: 70 IN | DIASTOLIC BLOOD PRESSURE: 70 MMHG | SYSTOLIC BLOOD PRESSURE: 140 MMHG | BODY MASS INDEX: 27.2 KG/M2 | WEIGHT: 190 LBS | HEART RATE: 89 BPM

## 2019-09-27 PROCEDURE — 99204 OFFICE O/P NEW MOD 45 MIN: CPT | Mod: 25

## 2019-09-27 PROCEDURE — 36415 COLL VENOUS BLD VENIPUNCTURE: CPT

## 2019-09-27 NOTE — CONSULT LETTER
[Dear  ___] : Dear  [unfilled], [Consult Letter:] : I had the pleasure of evaluating your patient, [unfilled]. [Please see my note below.] : Please see my note below. [Consult Closing:] : Thank you very much for allowing me to participate in the care of this patient.  If you have any questions, please do not hesitate to contact me. [Sincerely,] : Sincerely, [FreeTextEntry3] : Michelle Dominguez, \par

## 2019-09-27 NOTE — REVIEW OF SYSTEMS
[As Noted in HPI] : as noted in HPI [Chest Pain] : no chest pain [Palpitations] : no palpitations [Shortness Of Breath] : no shortness of breath [SOB on Exertion] : no shortness of breath during exertion [Nausea] : no nausea [Vomiting] : no vomiting was observed [Abdominal Pain] : no abdominal pain [Polyuria] : no polyuria [Nocturia] : no nocturia [Joint Pain] : no joint pain [Muscle Cramps] : no muscle cramps [Myalgia] : no myalgia  [Dizziness] : no dizziness [Depression] : no depression [Anxiety] : no anxiety [Cold Intolerance] : cold tolerant [Heat Intolerance] : heat tolerant [de-identified] : reports tingling in ear lobes, denies perioral tingling

## 2019-09-27 NOTE — HISTORY OF PRESENT ILLNESS
[FreeTextEntry1] : Quality: post surgical hypothyroidism and hypoparathyroidism\par Duration/Onset: September 5, 2019 he underwent total  laryngectomy for laryngeal cancer  and at that time thyroid and parathyroid glands removed\par Severity: moderate\par Associated symptoms: unable to talk s/p laryngectomy.  denies changes changes in bowel habits. 10 pounds weight since diagnosis of cancer. denies dysphagia.\par \par MODIFYING FACTORS: has been on LT4 137 mcg daily, TUMS 2.5 tabs TID and Calcitriol 0.25 mcg 1capsule BID since surgery\par \par \par \par

## 2019-09-27 NOTE — PHYSICAL EXAM
[Alert] : alert [No Acute Distress] : no acute distress [Well Developed] : well developed [Well Nourished] : well nourished [Normal Sclera/Conjunctiva] : normal sclera/conjunctiva [Normal Rate and Effort] : normal respiratory rhythm and effort [EOMI] : extra ocular movement intact [Normal Rate] : heart rate was normal  [Clear to Auscultation] : lungs were clear to auscultation bilaterally [Normal Bowel Sounds] : normal bowel sounds [Normal S1, S2] : normal S1 and S2 [Not Tender] : non-tender [Cranial Nerves Intact] : cranial nerves 2-12 were intact [Soft] : abdomen soft [Normal Reflexes] : deep tendon reflexes were 2+ and symmetric [No Tremors] : no tremors [Oriented x3] : oriented to person, place, and time [Normal Insight/Judgement] : insight and judgment were intact [Normal Affect] : the affect was normal [Normal Mood] : the mood was normal [de-identified] : healing surgical site [de-identified] : negative Chvosteks sign

## 2019-09-27 NOTE — ASSESSMENT
[FreeTextEntry1] : 74 year old male s/p total laryngectomy for laryngeal cancer.\par 1. post surgical hypothyroidism - euthyroid on exam\par - baseline TFTs today, cont LT4 137 mcg daily, repeat levels in 6-8 weeks\par \par 2. post surgical hypoparathyroidism - eucalcemic on exam\par - check labs today, cont current caclium and calcitriol supplementation, will advise further based on results

## 2019-09-27 NOTE — REASON FOR VISIT
[Consultation] : a consultation visit [Family Member] : family member [FreeTextEntry1] : Patient seen at the request of PCP for thyroid and parathyroid evaluation

## 2019-10-02 ENCOUNTER — RX CHANGE (OUTPATIENT)
Age: 74
End: 2019-10-02

## 2019-10-02 ENCOUNTER — APPOINTMENT (OUTPATIENT)
Dept: SPEECH THERAPY | Facility: CLINIC | Age: 74
End: 2019-10-02
Payer: MEDICARE

## 2019-10-03 ENCOUNTER — CLINICAL ADVICE (OUTPATIENT)
Age: 74
End: 2019-10-03

## 2019-10-04 ENCOUNTER — APPOINTMENT (OUTPATIENT)
Dept: SPEECH THERAPY | Facility: CLINIC | Age: 74
End: 2019-10-04
Payer: MEDICARE

## 2019-10-04 ENCOUNTER — OUTPATIENT (OUTPATIENT)
Dept: OUTPATIENT SERVICES | Facility: HOSPITAL | Age: 74
LOS: 1 days | Discharge: HOME | End: 2019-10-04
Payer: MEDICARE

## 2019-10-04 PROCEDURE — 92597 ORAL SPEECH DEVICE EVAL: CPT | Mod: GN

## 2019-10-10 ENCOUNTER — RX RENEWAL (OUTPATIENT)
Age: 74
End: 2019-10-10

## 2019-10-10 ENCOUNTER — MEDICATION RENEWAL (OUTPATIENT)
Age: 74
End: 2019-10-10

## 2019-10-11 ENCOUNTER — RX RENEWAL (OUTPATIENT)
Age: 74
End: 2019-10-11

## 2019-10-11 ENCOUNTER — APPOINTMENT (OUTPATIENT)
Dept: OTOLARYNGOLOGY | Facility: CLINIC | Age: 74
End: 2019-10-11
Payer: MEDICARE

## 2019-10-11 VITALS
SYSTOLIC BLOOD PRESSURE: 132 MMHG | RESPIRATION RATE: 16 BRPM | OXYGEN SATURATION: 100 % | WEIGHT: 190 LBS | BODY MASS INDEX: 27.2 KG/M2 | HEART RATE: 95 BPM | DIASTOLIC BLOOD PRESSURE: 75 MMHG | HEIGHT: 70 IN

## 2019-10-11 PROCEDURE — 99024 POSTOP FOLLOW-UP VISIT: CPT

## 2019-10-11 NOTE — REASON FOR VISIT
[Subsequent Evaluation] : a subsequent evaluation for [FreeTextEntry2] : follow up from surgery sept 5th

## 2019-10-11 NOTE — PHYSICAL EXAM
[de-identified] : post treatment changes. [Midline] : trachea located in midline position [Normal] : cranial nerves 2-12 intact

## 2019-10-11 NOTE — HISTORY OF PRESENT ILLNESS
[de-identified] : pt w large R transglottic tumor w partial airway obx and extralaryngeal spread.\par Patient is s/p TL and forearm free flap reconstruction on 9/5/19.\par Path report discussed with Dr Johnston.\par [id number], stage IV SCCa.

## 2019-10-14 ENCOUNTER — OUTPATIENT (OUTPATIENT)
Dept: OUTPATIENT SERVICES | Facility: HOSPITAL | Age: 74
LOS: 1 days | Discharge: HOME | End: 2019-10-14

## 2019-10-14 DIAGNOSIS — C32.9 MALIGNANT NEOPLASM OF LARYNX, UNSPECIFIED: ICD-10-CM

## 2019-10-14 PROCEDURE — 77263 THER RADIOLOGY TX PLNG CPLX: CPT

## 2019-10-15 ENCOUNTER — OUTPATIENT (OUTPATIENT)
Dept: OUTPATIENT SERVICES | Facility: HOSPITAL | Age: 74
LOS: 1 days | Discharge: HOME | End: 2019-10-15

## 2019-10-15 DIAGNOSIS — C32.9 MALIGNANT NEOPLASM OF LARYNX, UNSPECIFIED: ICD-10-CM

## 2019-10-17 PROCEDURE — 77301 RADIOTHERAPY DOSE PLAN IMRT: CPT | Mod: 26

## 2019-10-17 PROCEDURE — 77300 RADIATION THERAPY DOSE PLAN: CPT | Mod: 26

## 2019-10-17 PROCEDURE — 77338 DESIGN MLC DEVICE FOR IMRT: CPT | Mod: 26

## 2019-10-18 ENCOUNTER — APPOINTMENT (OUTPATIENT)
Dept: SPEECH THERAPY | Facility: CLINIC | Age: 74
End: 2019-10-18
Payer: MEDICARE

## 2019-10-18 PROCEDURE — 92507 TX SP LANG VOICE COMM INDIV: CPT | Mod: GN

## 2019-10-22 ENCOUNTER — OTHER (OUTPATIENT)
Age: 74
End: 2019-10-22

## 2019-10-22 ENCOUNTER — APPOINTMENT (OUTPATIENT)
Dept: OTOLARYNGOLOGY | Facility: CLINIC | Age: 74
End: 2019-10-22
Payer: MEDICARE

## 2019-10-22 VITALS
BODY MASS INDEX: 27.2 KG/M2 | HEIGHT: 70 IN | DIASTOLIC BLOOD PRESSURE: 73 MMHG | WEIGHT: 190 LBS | SYSTOLIC BLOOD PRESSURE: 137 MMHG | HEART RATE: 80 BPM | TEMPERATURE: 97.8 F

## 2019-10-22 PROCEDURE — 99024 POSTOP FOLLOW-UP VISIT: CPT

## 2019-10-22 NOTE — HISTORY OF PRESENT ILLNESS
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 Pt cleared by dental. Pt to see  tomorrow, awaiting start date of RT with . Pt seen by  monitoring thyroid taking levothyroxine 25mcg. Denies pain. Speaking well working with Chloe [employer].   Has seen Dr Dominguez as well.  Is using TEP speech

## 2019-10-22 NOTE — CONSULT LETTER
[Dear  ___] : Dear  [unfilled], [Courtesy Letter:] : I had the pleasure of seeing your patient, [unfilled], in my office today. [Please see my note below.] : Please see my note below. [Sincerely,] : Sincerely, [FreeTextEntry2] : Pipo Leon MD ([place], [place]) [FreeTextEntry3] : Kel Johnston MD

## 2019-10-23 ENCOUNTER — APPOINTMENT (OUTPATIENT)
Dept: HEMATOLOGY ONCOLOGY | Facility: CLINIC | Age: 74
End: 2019-10-23
Payer: MEDICARE

## 2019-10-23 VITALS
HEART RATE: 90 BPM | WEIGHT: 193.01 LBS | TEMPERATURE: 98 F | DIASTOLIC BLOOD PRESSURE: 72 MMHG | SYSTOLIC BLOOD PRESSURE: 145 MMHG | OXYGEN SATURATION: 97 % | BODY MASS INDEX: 27.63 KG/M2 | HEIGHT: 70 IN

## 2019-10-23 PROCEDURE — 99205 OFFICE O/P NEW HI 60 MIN: CPT

## 2019-10-23 PROCEDURE — 99024 POSTOP FOLLOW-UP VISIT: CPT

## 2019-10-23 NOTE — HISTORY OF PRESENT ILLNESS
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  He is s/p total laryngectomy, bilateral and central compartment neck dissection, total thyroidectomy, TEP with Dr. Johnston. He is following with endocrinology, , who is monitoring levothyroxine 25mcg qd.  \par  \par  [de-identified] : SH: Never smoked, but long-term secondhand smoke history. No EtOH. [de-identified] : Patient presents for initial evaluation. He denies pain. Speaking well and working with Chloe for speech and swallow. He has seen Dr Dominguez for synthroid.  He is using a TEP for speech.  See detailed ROS below.

## 2019-10-23 NOTE — RESULTS/DATA
[FreeTextEntry1] : 9/16/19 Pathology:\par 1. Lymph nodes, right neck, levels 2, 3 and 4, neck dissection\par - 1 out of 31 lymph nodes positive for metastatic carcinoma\par - Extranodal extension is identified\par 2. Lymph nodes, right paratracheal and pretracheal, dissection\par - 14 lymph nodes negative for metastatic carcinoma\par - Unremarkable parathyroid tissue\par 3. Lymph nodes, left neck, levels 2, 3 and 4, neck dissection\par - 36 lymph nodes negative for metastatic carcinoma\par 4. Lymph nodes, left paratracheal and pretracheal, dissection\par - 13 lymph nodes negative for metastatic carcinoma\par 5. Skin, stoma, biopsy\par - Skin with solar elastosis and underlying adnexa and subdermal fat\par 6. Additional posterior cricoid mucosal true margin, biopsy\par - Squamous mucosa negative for carcinoma and dysplasia\par 7. Right inferior posterior cricoid mucosal margin, biopsy\par - Squamous mucosa with small focus of high grade dysplasia\par 8. Right lateral pyriform sinus margin, biopsy\par - Squamous mucosa negative for carcinoma and dysplasia\par 9. Right posterior cricoid inferior soft tissue, biopsy\par - Fibroadipose tissue negative for carcinoma\par 10. Larynx, total laryngectomy, thyroidectomy, partial pharyngectomy\par - Squamous cell carcinoma, keratinizing, well-to-moderately differentiated (see synoptic summary)\par - Focal high grade dysplasia/carcinoma in situ\par - Thyroid, negative for carcinoma\par 11. Additional posterior cricoid soft tissue, biopsy\par - Fibrous connective tissue, skeletal muscle, and small focus of minor salivary glands, negative for carcinoma\par 12. Additional posterior cricoid mucosal margin, biopsy\par - Squamous mucosa negative for carcinoma and dysplasia\par 13. Additional left pyriform sinus margin, biopsy\par \par 8/29/19 Pathology:\par NECK, MID ANTERIOR, US GUIDED FNA POSITIVE FOR MALIGNANT CELLS.\par Consistent with squamous cell carcinoma\par LYMPH NODE, NECK, LEVEL 3, RIGHT, US GUIDED FNA SUSPICIOUS FOR MALIGNANCY.\par \par 8/23/19 MRI Orbit Face/Neck: There is an enhancing 4.4 x 4 x 4.6 cm, (AP, TR, CC) mass involving the right aryepiglottic fold, right piriform sinus, right greater than left false and true cords, with extra lateral extension into the anterior strap muscles and thyroid with 1.1 cm subglottic extension concerning neoplasm as detailed above. \par Lymph nodes in III cervical chains, although nodes are less than 1.5 cm is in short axis, the nodes are slightly clustered and heterogeneous and enhancement, metastatic adenopathy is not excluded, please see PET/CT for full description of scottie findings.\par \par 8/22/19 PET/CT: Abnormal FDG-PET/CT scan. \par 1. Large hypermetabolic transglottic mass centered in right larynx with extension across the midline. There is extralaryngeal extension involving the bilateral strap muscles. \par 2. Asymmetric hypermetabolism in cricoarytenoid region, left greater than right, is indeterminate, possibly physiologic. \par 3 Small hypermetabolic lymph nodes in right level III and IV cervical lymph nodes are compatible with metastatic disease. A tiny left level III lymph node is indeterminate. Further evaluation may be performed with ultrasound guided percutaneous needle biopsy. \par 4. No evidence of distant disease.\par \par 8/6/19 CT Neck: 2.6 x 2.8 x 2.7 cm transglottic mass centered in the right larynx with extension across midline and into strap muscles. Histopathologic correlation is recommended. 12 x 7 mm right level 3 lymph node.

## 2019-10-24 ENCOUNTER — APPOINTMENT (OUTPATIENT)
Dept: SPEECH THERAPY | Facility: CLINIC | Age: 74
End: 2019-10-24

## 2019-10-25 ENCOUNTER — APPOINTMENT (OUTPATIENT)
Age: 74
End: 2019-10-25
Payer: MEDICARE

## 2019-10-25 PROCEDURE — 99024 POSTOP FOLLOW-UP VISIT: CPT

## 2019-10-25 NOTE — PHYSICAL EXAM
[de-identified] : post treatment changes. [Midline] : trachea located in midline position [Normal] : no rashes

## 2019-10-25 NOTE — HISTORY OF PRESENT ILLNESS
[de-identified] : pt w large R transglottic tumor w partial airway obx and extralaryngeal spread.\par Patient is s/p TL and forearm free flap reconstruction on 9/5/19.\par Path report discussed with Dr Johnston.\par [id number], stage IV SCCa.

## 2019-10-28 VITALS
WEIGHT: 191 LBS | OXYGEN SATURATION: 99 % | RESPIRATION RATE: 16 BRPM | HEIGHT: 70 IN | BODY MASS INDEX: 27.35 KG/M2 | DIASTOLIC BLOOD PRESSURE: 72 MMHG | HEART RATE: 89 BPM | SYSTOLIC BLOOD PRESSURE: 148 MMHG | TEMPERATURE: 98 F

## 2019-10-28 PROCEDURE — 77427 RADIATION TX MANAGEMENT X5: CPT

## 2019-10-28 PROCEDURE — 77387B: CUSTOM | Mod: 26

## 2019-10-28 NOTE — REVIEW OF SYSTEMS
[Fatigue: Grade 0] : Fatigue: Grade 0 [Mucositis Oral: Grade 0] : Mucositis Oral: Grade 0  [Xerostomia: Grade 0] : Xerostomia: Grade 0 [Oral Pain: Grade 0] : Oral Pain: Grade 0 [Salivary duct inflammation: Grade 0] : Salivary duct inflammation: Grade 0 [Dysgeusia: Grade 0] : Dysgeusia: Grade 0 [Dermatitis Radiation: Grade 0] : Dermatitis Radiation: Grade 0

## 2019-10-28 NOTE — PHYSICAL EXAM
[Normal] : oriented to person, place and time, the affect was normal, the mood was normal and not anxious [de-identified] : trach with TEP speaking valve in place

## 2019-10-28 NOTE — DISEASE MANAGEMENT
[Clinical] : TNM Stage: c [SAMMY] : SAMMY [FreeTextEntry4] : squamous cell carcinoma [TTNM] : 4a [NTNM] : 2b [MTNM] : 0 [de-identified] : 200 [de-identified] : 6,600 cGy [de-identified] : larynx

## 2019-10-29 ENCOUNTER — APPOINTMENT (OUTPATIENT)
Dept: SPEECH THERAPY | Facility: CLINIC | Age: 74
End: 2019-10-29

## 2019-10-29 PROCEDURE — 77387B: CUSTOM | Mod: 26

## 2019-10-30 PROCEDURE — 77014: CPT | Mod: 26

## 2019-10-31 PROCEDURE — 77387B: CUSTOM | Mod: 26

## 2019-11-01 PROCEDURE — 77387B: CUSTOM | Mod: 26

## 2019-11-04 VITALS
DIASTOLIC BLOOD PRESSURE: 68 MMHG | BODY MASS INDEX: 26.83 KG/M2 | TEMPERATURE: 98.2 F | OXYGEN SATURATION: 94 % | SYSTOLIC BLOOD PRESSURE: 146 MMHG | WEIGHT: 187 LBS | HEART RATE: 86 BPM | RESPIRATION RATE: 16 BRPM

## 2019-11-04 PROCEDURE — 77427 RADIATION TX MANAGEMENT X5: CPT

## 2019-11-04 PROCEDURE — 77387B: CUSTOM | Mod: 26

## 2019-11-04 NOTE — DISEASE MANAGEMENT
[Clinical] : TNM Stage: c [FreeTextEntry4] : squamous cell carcinoma [TTNM] : 4a [NTNM] : 2b [MTNM] : 0 [SAMMY] : SAMYM [de-identified] : 1,200 [de-identified] : 6,600 cGy [de-identified] : larynx

## 2019-11-04 NOTE — PHYSICAL EXAM
[Normal] : oriented to person, place and time, the affect was normal, the mood was normal and not anxious [de-identified] : trach with TEP speaking valve in place

## 2019-11-05 PROCEDURE — 77387B: CUSTOM | Mod: 26

## 2019-11-06 PROCEDURE — 77014: CPT | Mod: 26

## 2019-11-06 NOTE — PHYSICAL THERAPY INITIAL EVALUATION ADULT - FOLLOWS COMMANDS/ANSWERS QUESTIONS, REHAB EVAL
Findings consistent with left distal radius fracture-healed with ulnar-sided pain  Findings and treatment options were discussed with the patient  Discussed the patient should continue occupational therapy along with use of brace with activities  Discussed use of ice and NSAIDs to help reduce swelling and pain  Follow-up as scheduled in 4 weeks for re-evaluation  All questions were answered to patient's satisfaction  100% of the time

## 2019-11-07 PROCEDURE — 77387B: CUSTOM | Mod: 26

## 2019-11-08 PROCEDURE — 77387B: CUSTOM | Mod: 26

## 2019-11-11 VITALS
DIASTOLIC BLOOD PRESSURE: 62 MMHG | HEART RATE: 92 BPM | OXYGEN SATURATION: 96 % | WEIGHT: 187 LBS | BODY MASS INDEX: 26.83 KG/M2 | RESPIRATION RATE: 16 BRPM | SYSTOLIC BLOOD PRESSURE: 128 MMHG | TEMPERATURE: 98.1 F

## 2019-11-11 PROCEDURE — 77427 RADIATION TX MANAGEMENT X5: CPT

## 2019-11-11 PROCEDURE — 77387B: CUSTOM | Mod: 26

## 2019-11-11 NOTE — PHYSICAL EXAM
[Normal] : oriented to person, place and time, the affect was normal, the mood was normal and not anxious [de-identified] : trach with TEP speaking valve in place

## 2019-11-11 NOTE — REVIEW OF SYSTEMS
[Fatigue: Grade 0] : Fatigue: Grade 0 [Xerostomia: Grade 0] : Xerostomia: Grade 0 [Mucositis Oral: Grade 0] : Mucositis Oral: Grade 0  [Dysgeusia: Grade 0] : Dysgeusia: Grade 0 [Oral Pain: Grade 0] : Oral Pain: Grade 0 [Salivary duct inflammation: Grade 0] : Salivary duct inflammation: Grade 0 [Dermatitis Radiation: Grade 0] : Dermatitis Radiation: Grade 0

## 2019-11-11 NOTE — DISEASE MANAGEMENT
[Clinical] : TNM Stage: c [SAMMY] : SAMMY [FreeTextEntry4] : squamous cell carcinoma [TTNM] : 4a [MTNM] : 0 [de-identified] : 2200 [NTNM] : 2b [de-identified] : 6,600 cGy [de-identified] : larynx

## 2019-11-12 PROCEDURE — 77387B: CUSTOM | Mod: 26

## 2019-11-13 PROCEDURE — 77014: CPT | Mod: 26

## 2019-11-15 PROCEDURE — 77387B: CUSTOM | Mod: 26

## 2019-11-18 VITALS
RESPIRATION RATE: 16 BRPM | TEMPERATURE: 98 F | HEART RATE: 90 BPM | WEIGHT: 183.4 LBS | HEIGHT: 70 IN | DIASTOLIC BLOOD PRESSURE: 64 MMHG | OXYGEN SATURATION: 97 % | BODY MASS INDEX: 26.26 KG/M2 | SYSTOLIC BLOOD PRESSURE: 123 MMHG

## 2019-11-18 PROCEDURE — 77387B: CUSTOM | Mod: 26

## 2019-11-18 NOTE — DISEASE MANAGEMENT
[Clinical] : TNM Stage: c [FreeTextEntry4] : squamous cell carcinoma [TTNM] : 4a [NTNM] : 2b [SAMMY] : SAMMY [MTNM] : 0 [de-identified] : 3,000 [de-identified] : 6,600 cGy [de-identified] : larynx

## 2019-11-19 PROCEDURE — 77427 RADIATION TX MANAGEMENT X5: CPT

## 2019-11-19 PROCEDURE — 77387B: CUSTOM | Mod: 26

## 2019-11-20 PROCEDURE — 77014: CPT | Mod: 26

## 2019-11-21 PROCEDURE — 77387B: CUSTOM | Mod: 26

## 2019-11-22 ENCOUNTER — APPOINTMENT (OUTPATIENT)
Dept: OTOLARYNGOLOGY | Facility: CLINIC | Age: 74
End: 2019-11-22
Payer: MEDICARE

## 2019-11-22 VITALS — SYSTOLIC BLOOD PRESSURE: 128 MMHG | HEIGHT: 70 IN | DIASTOLIC BLOOD PRESSURE: 67 MMHG

## 2019-11-22 PROCEDURE — 77387B: CUSTOM | Mod: 26

## 2019-11-22 PROCEDURE — 99024 POSTOP FOLLOW-UP VISIT: CPT

## 2019-11-22 NOTE — PHYSICAL EXAM
[Restricted in physically strenuous activity but ambulatory and able to carry out work of a light or sedentary nature] : Status 1- Restricted in physically strenuous activity but ambulatory and able to carry out work of a light or sedentary nature, e.g., light house work, office work [Midline] : trachea located in midline position [Normal] : no rashes

## 2019-11-22 NOTE — HISTORY OF PRESENT ILLNESS
[de-identified] : pt w large R transglottic tumor w partial airway obx and extralaryngeal spread.\par Patient is s/p TL and forearm free flap reconstruction on 9/5/19.\par Path report discussed with Dr Johnston.\par [id number], stage IV SCCa.

## 2019-11-25 VITALS
RESPIRATION RATE: 16 BRPM | HEART RATE: 90 BPM | TEMPERATURE: 98 F | SYSTOLIC BLOOD PRESSURE: 129 MMHG | DIASTOLIC BLOOD PRESSURE: 69 MMHG | HEIGHT: 70 IN | OXYGEN SATURATION: 93 % | WEIGHT: 180 LBS | BODY MASS INDEX: 25.77 KG/M2

## 2019-11-25 PROCEDURE — 77387B: CUSTOM | Mod: 26

## 2019-11-25 NOTE — PHYSICAL EXAM
[Normal] : oriented to person, place and time, the affect was normal, the mood was normal and not anxious [de-identified] : trach with TEP speaking valve in place

## 2019-11-25 NOTE — DISEASE MANAGEMENT
[Clinical] : TNM Stage: c [TTNM] : 4a [NTNM] : 2b [FreeTextEntry4] : squamous cell carcinoma [MTNM] : 0 [SAMMY] : SAMMY [de-identified] : 4,000 [de-identified] : 6,600 cGy [de-identified] : larynx

## 2019-11-26 PROCEDURE — 77387B: CUSTOM | Mod: 26

## 2019-11-26 PROCEDURE — 77427 RADIATION TX MANAGEMENT X5: CPT

## 2019-11-27 PROCEDURE — 77014: CPT | Mod: 26

## 2019-11-29 ENCOUNTER — LABORATORY RESULT (OUTPATIENT)
Age: 74
End: 2019-11-29

## 2019-11-29 PROCEDURE — 77387B: CUSTOM | Mod: 26

## 2019-12-02 VITALS
SYSTOLIC BLOOD PRESSURE: 117 MMHG | OXYGEN SATURATION: 97 % | TEMPERATURE: 98.5 F | BODY MASS INDEX: 25.77 KG/M2 | DIASTOLIC BLOOD PRESSURE: 71 MMHG | HEART RATE: 85 BPM | HEIGHT: 70 IN | WEIGHT: 180 LBS | RESPIRATION RATE: 16 BRPM

## 2019-12-02 LAB
BASOPHILS # BLD AUTO: 0 K/UL
BASOPHILS NFR BLD AUTO: 0 %
EOSINOPHIL # BLD AUTO: 0.08 K/UL
EOSINOPHIL NFR BLD AUTO: 1.7 %
HCT VFR BLD CALC: 38 %
HGB BLD-MCNC: 12.2 G/DL
LYMPHOCYTES # BLD AUTO: 0.45 K/UL
LYMPHOCYTES NFR BLD AUTO: 9.6 %
MAN DIFF?: NORMAL
MCHC RBC-ENTMCNC: 28.4 PG
MCHC RBC-ENTMCNC: 32.1 GM/DL
MCV RBC AUTO: 88.4 FL
MONOCYTES # BLD AUTO: 0.24 K/UL
MONOCYTES NFR BLD AUTO: 5.2 %
NEUTROPHILS # BLD AUTO: 3.92 K/UL
NEUTROPHILS NFR BLD AUTO: 83.5 %
PLATELET # BLD AUTO: 229 K/UL
RBC # BLD: 4.3 M/UL
RBC # FLD: 13.1 %
WBC # FLD AUTO: 4.7 K/UL

## 2019-12-02 PROCEDURE — 77387B: CUSTOM | Mod: 26

## 2019-12-02 NOTE — REVIEW OF SYSTEMS
[Fatigue: Grade 0] : Fatigue: Grade 0 [Xerostomia: Grade 0] : Xerostomia: Grade 0 [Oral Pain: Grade 0] : Oral Pain: Grade 0 [Mucositis Oral: Grade 0] : Mucositis Oral: Grade 0  [Salivary duct inflammation: Grade 0] : Salivary duct inflammation: Grade 0 [Dysgeusia: Grade 0] : Dysgeusia: Grade 0 [Dermatitis Radiation: Grade 0] : Dermatitis Radiation: Grade 0

## 2019-12-03 PROCEDURE — 77387B: CUSTOM | Mod: 26

## 2019-12-04 PROCEDURE — 77427 RADIATION TX MANAGEMENT X5: CPT

## 2019-12-04 PROCEDURE — 77014: CPT | Mod: 26

## 2019-12-04 NOTE — DISEASE MANAGEMENT
[Clinical] : TNM Stage: c [SAMMY] : SAMMY [FreeTextEntry4] : squamous cell carcinoma [TTNM] : 4a [NTNM] : 2b [MTNM] : 0 [de-identified] : 3,520 [de-identified] : 6,600 cGy [de-identified] : larynx

## 2019-12-04 NOTE — DISCUSSION/SUMMARY
[Cancer Type / Location / Histology Subtype: ________] : Cancer Type / Location / Histology Subtype: [unfilled] [Diagnosis Date (year): ____] : Diagnosis Date (year): [unfilled] [Not Applicable] : not applicable [Surgery] : Surgery: Yes [Surgery Date(s) (year): ____] : Surgery Date(s) (year): [unfilled] [Surgical Procedure / Location / Findings: _________] : Surgical Procedure / Location / Findings: [unfilled] [Radiation] : Radiation: Yes [Body Area Treated: _________] : Body Area Treated: [unfilled] [End Date (year): ____] : End Date (year): [unfilled] [Follow up with Radiation MD in _____] : Follow up with Radiation MD in [unfilled] [FreeTextEntry8] : Ayala Velazco

## 2019-12-04 NOTE — PHYSICAL EXAM
[Normal] : oriented to person, place and time, the affect was normal, the mood was normal and not anxious [de-identified] : trach with TEP speaking valve in place

## 2019-12-05 PROCEDURE — 77387B: CUSTOM | Mod: 26

## 2019-12-06 ENCOUNTER — RX RENEWAL (OUTPATIENT)
Age: 74
End: 2019-12-06

## 2019-12-06 PROCEDURE — 77387B: CUSTOM | Mod: 26

## 2019-12-09 VITALS
BODY MASS INDEX: 25.62 KG/M2 | HEIGHT: 70 IN | SYSTOLIC BLOOD PRESSURE: 112 MMHG | DIASTOLIC BLOOD PRESSURE: 52 MMHG | WEIGHT: 179 LBS | TEMPERATURE: 98.1 F | OXYGEN SATURATION: 96 % | RESPIRATION RATE: 16 BRPM | HEART RATE: 79 BPM

## 2019-12-09 PROCEDURE — 77387B: CUSTOM | Mod: 26

## 2019-12-09 NOTE — VITALS
[Maximal Pain Intensity: 1/10] : 1/10 [Pain Description/Quality: ___] : Pain description/quality: [unfilled] [Least Pain Intensity: 0/10] : 0/10 [Pain Duration: ___] : Pain duration: [unfilled] [Pain Location: ___] : Pain Location: [unfilled] [NoTreatment Scheduled] : no treatment scheduled [90: Able to carry normal activity; minor signs or symptoms of disease.] : 90: Able to carry normal activity; minor signs or symptoms of disease.  [Pain Interferes with ADLs] : Pain does not interfere with activities of daily living [ECOG Performance Status: 1 - Restricted in physically strenuous activity but ambulatory and able to carry out work of a light or sedentary nature] : Performance Status: 1 - Restricted in physically strenuous activity but ambulatory and able to carry out work of a light or sedentary nature, e.g., light house work, office work

## 2019-12-09 NOTE — HISTORY OF PRESENT ILLNESS
[FreeTextEntry1] : Pt is tolerating TX well.  No difficulty eating.  Pt is having some slight soreness when swallowing Pt is starting to have increased mucus.  No pain noted.  Skin is intact and pt is applying Eucerin 2x a day.  Discharge instructions given to pt.

## 2019-12-09 NOTE — REVIEW OF SYSTEMS
[Oral Pain: Grade 0] : Oral Pain: Grade 0 [Xerostomia: Grade 0] : Xerostomia: Grade 0 [Salivary duct inflammation: Grade 1 - Slightly thickened saliva; slightly altered taste (e.g., metallic)] : Salivary duct inflammation: Grade 1 - Slightly thickened saliva; slightly altered taste (e.g., metallic) [Dysgeusia: Grade 2 - Altered taste with change in diet (e.g., oral supplements); noxious or unpleasant taste; loss of taste] : Dysgeusia: Grade 2 - Altered taste with change in diet (e.g., oral supplements); noxious or unpleasant taste; loss of taste [Dermatitis Radiation: Grade 0] : Dermatitis Radiation: Grade 0

## 2019-12-10 PROCEDURE — 77387B: CUSTOM | Mod: 26

## 2019-12-11 PROCEDURE — 77014: CPT | Mod: 26

## 2019-12-12 PROCEDURE — 77387B: CUSTOM | Mod: 26

## 2019-12-13 ENCOUNTER — APPOINTMENT (OUTPATIENT)
Dept: ENDOCRINOLOGY | Facility: CLINIC | Age: 74
End: 2019-12-13
Payer: MEDICARE

## 2019-12-13 VITALS
WEIGHT: 176 LBS | SYSTOLIC BLOOD PRESSURE: 126 MMHG | HEART RATE: 91 BPM | HEIGHT: 70 IN | DIASTOLIC BLOOD PRESSURE: 60 MMHG | BODY MASS INDEX: 25.2 KG/M2

## 2019-12-13 PROCEDURE — 77387B: CUSTOM | Mod: 26

## 2019-12-13 PROCEDURE — 99214 OFFICE O/P EST MOD 30 MIN: CPT

## 2019-12-13 RX ORDER — ASPIRIN 81 MG
81 TABLET, DELAYED RELEASE (ENTERIC COATED) ORAL DAILY
Refills: 0 | Status: ACTIVE | COMMUNITY

## 2019-12-13 RX ORDER — AMLODIPINE BESYLATE 10 MG/1
10 TABLET ORAL DAILY
Refills: 0 | Status: ACTIVE | COMMUNITY

## 2019-12-13 RX ORDER — SIMVASTATIN 20 MG/1
20 TABLET, FILM COATED ORAL DAILY
Refills: 0 | Status: ACTIVE | COMMUNITY

## 2019-12-13 NOTE — REVIEW OF SYSTEMS
[As Noted in HPI] : as noted in HPI [Chest Pain] : no chest pain [Shortness Of Breath] : no shortness of breath [Palpitations] : no palpitations [SOB on Exertion] : no shortness of breath during exertion [Nausea] : no nausea [Vomiting] : no vomiting was observed [Constipation] : constipation [Abdominal Pain] : no abdominal pain [Polyuria] : no polyuria [Nocturia] : no nocturia [Joint Pain] : no joint pain [Muscle Cramps] : no muscle cramps [Myalgia] : no myalgia  [Dizziness] : no dizziness [Pain/Numbness of Digits] : no pain/numbness of digits [Depression] : no depression [Anxiety] : no anxiety [Cold Intolerance] : cold tolerant [Heat Intolerance] : heat tolerant [de-identified] :  denies perioral tingling

## 2019-12-13 NOTE — HISTORY OF PRESENT ILLNESS
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  10 pounds weight since diagnosis of cancer. denies dysphagia. (+) constipation with TUMS, does not want to take TUMS anymore\par \par MODIFYING FACTORS: improved with meds\par  has been on TUMS 1000 mg (400 mg elemental calcium) 2.5 tabs TID and Calcitriol 0.25 mcg 1capsule BID since surgery\par started on thyroid hormone at time of surgeru and dose decrease this month due to supressed TSH, now on LT4 112 mcg daily\par \par \par \par

## 2019-12-13 NOTE — ASSESSMENT
[FreeTextEntry1] : 74 year old male s/p total laryngectomy for laryngeal cancer.\par 1. post surgical hypothyroidism - euthyroid on exam, TSH too low\par - LT4 decreased to 112 mcg daily 4 days ago, repeat TFTs 6 weeks\par \par 2. post surgical hypoparathyroidism - eucalcemic, but pt reports severe constipation with high dose TUMS and does not like taste of TUMS\par - cont calcitriol 0.25 mcg BID\par - change TUMS to caltrate 600 mg TID (reduce calcium dose) to see if this helps with constipation\par - repeat calcium levels in 1 week\par - reviewed signs/symptoms hypocalcemia

## 2019-12-13 NOTE — PHYSICAL EXAM
[Well Nourished] : well nourished [No Acute Distress] : no acute distress [Alert] : alert [Well Developed] : well developed [EOMI] : extra ocular movement intact [Normal Sclera/Conjunctiva] : normal sclera/conjunctiva [Normal Rate and Effort] : normal respiratory rhythm and effort [Clear to Auscultation] : lungs were clear to auscultation bilaterally [Normal Rate] : heart rate was normal  [Normal S1, S2] : normal S1 and S2 [Normal Bowel Sounds] : normal bowel sounds [Soft] : abdomen soft [Not Tender] : non-tender [Cranial Nerves Intact] : cranial nerves 2-12 were intact [Normal Reflexes] : deep tendon reflexes were 2+ and symmetric [Oriented x3] : oriented to person, place, and time [No Tremors] : no tremors [Normal Mood] : the mood was normal [Normal Affect] : the affect was normal [Normal Insight/Judgement] : insight and judgment were intact [Well Healed Scar] : well healed scar [de-identified] : negative Chvosteks sign

## 2019-12-26 ENCOUNTER — RX RENEWAL (OUTPATIENT)
Age: 74
End: 2019-12-26

## 2019-12-26 LAB
ANION GAP SERPL CALC-SCNC: 11 MMOL/L
BUN SERPL-MCNC: 17 MG/DL
CA-I SERPL-SCNC: 1.22 MMOL/L
CALCIUM SERPL-MCNC: 9.7 MG/DL
CHLORIDE SERPL-SCNC: 94 MMOL/L
CO2 SERPL-SCNC: 34 MMOL/L
CREAT SERPL-MCNC: 1.08 MG/DL
GLUCOSE SERPL-MCNC: 102 MG/DL
POTASSIUM SERPL-SCNC: 3.3 MMOL/L
SODIUM SERPL-SCNC: 139 MMOL/L

## 2019-12-30 ENCOUNTER — CLINICAL ADVICE (OUTPATIENT)
Age: 74
End: 2019-12-30

## 2020-01-01 ENCOUNTER — APPOINTMENT (OUTPATIENT)
Dept: ENDOCRINOLOGY | Facility: CLINIC | Age: 75
End: 2020-01-01
Payer: MEDICARE

## 2020-01-01 ENCOUNTER — NON-APPOINTMENT (OUTPATIENT)
Age: 75
End: 2020-01-01

## 2020-01-01 ENCOUNTER — LABORATORY RESULT (OUTPATIENT)
Age: 75
End: 2020-01-01

## 2020-01-01 ENCOUNTER — APPOINTMENT (OUTPATIENT)
Dept: HEMATOLOGY ONCOLOGY | Facility: CLINIC | Age: 75
End: 2020-01-01
Payer: MEDICARE

## 2020-01-01 ENCOUNTER — RESULT REVIEW (OUTPATIENT)
Age: 75
End: 2020-01-01

## 2020-01-01 ENCOUNTER — APPOINTMENT (OUTPATIENT)
Dept: RADIATION ONCOLOGY | Facility: CLINIC | Age: 75
End: 2020-01-01
Payer: MEDICARE

## 2020-01-01 ENCOUNTER — APPOINTMENT (OUTPATIENT)
Age: 75
End: 2020-01-01

## 2020-01-01 ENCOUNTER — APPOINTMENT (OUTPATIENT)
Dept: CT IMAGING | Facility: CLINIC | Age: 75
End: 2020-01-01
Payer: MEDICARE

## 2020-01-01 ENCOUNTER — APPOINTMENT (OUTPATIENT)
Dept: OTOLARYNGOLOGY | Facility: CLINIC | Age: 75
End: 2020-01-01
Payer: MEDICARE

## 2020-01-01 ENCOUNTER — OUTPATIENT (OUTPATIENT)
Dept: OUTPATIENT SERVICES | Facility: HOSPITAL | Age: 75
LOS: 1 days | Discharge: HOME | End: 2020-01-01

## 2020-01-01 ENCOUNTER — OUTPATIENT (OUTPATIENT)
Dept: OUTPATIENT SERVICES | Facility: HOSPITAL | Age: 75
LOS: 1 days | End: 2020-01-01
Payer: MEDICARE

## 2020-01-01 ENCOUNTER — APPOINTMENT (OUTPATIENT)
Dept: SPEECH THERAPY | Facility: CLINIC | Age: 75
End: 2020-01-01
Payer: MEDICARE

## 2020-01-01 ENCOUNTER — RX RENEWAL (OUTPATIENT)
Age: 75
End: 2020-01-01

## 2020-01-01 ENCOUNTER — APPOINTMENT (OUTPATIENT)
Dept: HEMATOLOGY ONCOLOGY | Facility: CLINIC | Age: 75
End: 2020-01-01

## 2020-01-01 ENCOUNTER — APPOINTMENT (OUTPATIENT)
Dept: DISASTER EMERGENCY | Facility: CLINIC | Age: 75
End: 2020-01-01

## 2020-01-01 VITALS
HEART RATE: 89 BPM | BODY MASS INDEX: 22.9 KG/M2 | OXYGEN SATURATION: 98 % | HEIGHT: 70 IN | WEIGHT: 160 LBS | SYSTOLIC BLOOD PRESSURE: 130 MMHG | DIASTOLIC BLOOD PRESSURE: 70 MMHG

## 2020-01-01 VITALS
HEIGHT: 70 IN | TEMPERATURE: 99 F | HEART RATE: 78 BPM | DIASTOLIC BLOOD PRESSURE: 74 MMHG | RESPIRATION RATE: 22 BRPM | SYSTOLIC BLOOD PRESSURE: 145 MMHG | WEIGHT: 164.91 LBS

## 2020-01-01 VITALS — HEART RATE: 93 BPM | DIASTOLIC BLOOD PRESSURE: 74 MMHG | SYSTOLIC BLOOD PRESSURE: 144 MMHG

## 2020-01-01 VITALS
OXYGEN SATURATION: 99 % | BODY MASS INDEX: 24.05 KG/M2 | HEIGHT: 70 IN | SYSTOLIC BLOOD PRESSURE: 130 MMHG | WEIGHT: 168 LBS | DIASTOLIC BLOOD PRESSURE: 80 MMHG | HEART RATE: 75 BPM

## 2020-01-01 VITALS
BODY MASS INDEX: 24.2 KG/M2 | HEART RATE: 96 BPM | DIASTOLIC BLOOD PRESSURE: 70 MMHG | HEIGHT: 70 IN | OXYGEN SATURATION: 98 % | WEIGHT: 169 LBS | SYSTOLIC BLOOD PRESSURE: 110 MMHG

## 2020-01-01 VITALS
OXYGEN SATURATION: 96 % | SYSTOLIC BLOOD PRESSURE: 155 MMHG | HEIGHT: 70 IN | HEART RATE: 98 BPM | WEIGHT: 155.01 LBS | DIASTOLIC BLOOD PRESSURE: 74 MMHG | TEMPERATURE: 97.3 F | BODY MASS INDEX: 22.19 KG/M2

## 2020-01-01 VITALS
OXYGEN SATURATION: 97 % | HEART RATE: 101 BPM | TEMPERATURE: 97.8 F | WEIGHT: 159.03 LBS | SYSTOLIC BLOOD PRESSURE: 156 MMHG | DIASTOLIC BLOOD PRESSURE: 68 MMHG | HEIGHT: 70 IN | BODY MASS INDEX: 22.77 KG/M2

## 2020-01-01 VITALS — HEART RATE: 94 BPM | DIASTOLIC BLOOD PRESSURE: 66 MMHG | SYSTOLIC BLOOD PRESSURE: 153 MMHG

## 2020-01-01 VITALS
BODY MASS INDEX: 22.48 KG/M2 | WEIGHT: 157 LBS | HEIGHT: 70 IN | DIASTOLIC BLOOD PRESSURE: 78 MMHG | OXYGEN SATURATION: 90 % | SYSTOLIC BLOOD PRESSURE: 148 MMHG | HEART RATE: 95 BPM

## 2020-01-01 VITALS
SYSTOLIC BLOOD PRESSURE: 116 MMHG | DIASTOLIC BLOOD PRESSURE: 50 MMHG | HEART RATE: 80 BPM | OXYGEN SATURATION: 99 % | RESPIRATION RATE: 15 BRPM

## 2020-01-01 VITALS — WEIGHT: 165 LBS | BODY MASS INDEX: 23.68 KG/M2

## 2020-01-01 VITALS
RESPIRATION RATE: 14 BRPM | OXYGEN SATURATION: 98 % | TEMPERATURE: 98 F | DIASTOLIC BLOOD PRESSURE: 60 MMHG | HEIGHT: 70 IN | HEART RATE: 88 BPM | WEIGHT: 156.53 LBS | SYSTOLIC BLOOD PRESSURE: 150 MMHG

## 2020-01-01 VITALS
SYSTOLIC BLOOD PRESSURE: 144 MMHG | OXYGEN SATURATION: 96 % | DIASTOLIC BLOOD PRESSURE: 75 MMHG | BODY MASS INDEX: 22.62 KG/M2 | HEART RATE: 106 BPM | WEIGHT: 158.01 LBS | HEIGHT: 70 IN | TEMPERATURE: 97.2 F

## 2020-01-01 DIAGNOSIS — C32.9 MALIGNANT NEOPLASM OF LARYNX, UNSPECIFIED: ICD-10-CM

## 2020-01-01 DIAGNOSIS — C76.0 MALIGNANT NEOPLASM OF HEAD, FACE AND NECK: ICD-10-CM

## 2020-01-01 DIAGNOSIS — R91.8 OTHER NONSPECIFIC ABNORMAL FINDING OF LUNG FIELD: ICD-10-CM

## 2020-01-01 DIAGNOSIS — R73.01 IMPAIRED FASTING GLUCOSE: ICD-10-CM

## 2020-01-01 DIAGNOSIS — Z51.11 ENCOUNTER FOR ANTINEOPLASTIC CHEMOTHERAPY: ICD-10-CM

## 2020-01-01 DIAGNOSIS — Z90.02 ACQUIRED ABSENCE OF LARYNX: ICD-10-CM

## 2020-01-01 DIAGNOSIS — Z01.818 ENCOUNTER FOR OTHER PREPROCEDURAL EXAMINATION: ICD-10-CM

## 2020-01-01 DIAGNOSIS — E83.52 HYPERCALCEMIA: ICD-10-CM

## 2020-01-01 DIAGNOSIS — Z98.890 OTHER SPECIFIED POSTPROCEDURAL STATES: ICD-10-CM

## 2020-01-01 DIAGNOSIS — Z92.3 PERSONAL HISTORY OF IRRADIATION: ICD-10-CM

## 2020-01-01 DIAGNOSIS — Z29.9 ENCOUNTER FOR PROPHYLACTIC MEASURES, UNSPECIFIED: ICD-10-CM

## 2020-01-01 LAB
ALBUMIN SERPL ELPH-MCNC: 3.6 G/DL
ALBUMIN SERPL ELPH-MCNC: 3.8 G/DL
ALBUMIN SERPL ELPH-MCNC: 3.9 G/DL
ALBUMIN SERPL ELPH-MCNC: 4 G/DL — SIGNIFICANT CHANGE UP (ref 3.3–5.2)
ALP BLD-CCNC: 65 U/L
ALP BLD-CCNC: 69 U/L
ALP BLD-CCNC: 70 U/L
ALP SERPL-CCNC: 71 U/L — SIGNIFICANT CHANGE UP (ref 40–120)
ALT FLD-CCNC: 10 U/L — SIGNIFICANT CHANGE UP
ALT SERPL-CCNC: 12 U/L
ALT SERPL-CCNC: 7 U/L
ALT SERPL-CCNC: 7 U/L
ANION GAP SERPL CALC-SCNC: 11 MMOL/L
ANION GAP SERPL CALC-SCNC: 13 MMOL/L
ANION GAP SERPL CALC-SCNC: 13 MMOL/L — SIGNIFICANT CHANGE UP (ref 5–17)
ANION GAP SERPL CALC-SCNC: 14 MMOL/L
APTT BLD: 38.7 SEC — HIGH (ref 27.5–35.5)
AST SERPL-CCNC: 15 U/L
AST SERPL-CCNC: 17 U/L
AST SERPL-CCNC: 18 U/L — SIGNIFICANT CHANGE UP
AST SERPL-CCNC: 22 U/L
BASOPHILS # BLD AUTO: 0 K/UL — SIGNIFICANT CHANGE UP (ref 0–0.2)
BASOPHILS # BLD AUTO: 0.03 K/UL — SIGNIFICANT CHANGE UP (ref 0–0.2)
BASOPHILS # BLD AUTO: 0.1 K/UL — SIGNIFICANT CHANGE UP (ref 0–0.2)
BASOPHILS NFR BLD AUTO: 0.4 % — SIGNIFICANT CHANGE UP (ref 0–2)
BASOPHILS NFR BLD AUTO: 0.7 % — SIGNIFICANT CHANGE UP (ref 0–2)
BASOPHILS NFR BLD AUTO: 0.8 % — SIGNIFICANT CHANGE UP (ref 0–2)
BILIRUB SERPL-MCNC: 0.2 MG/DL
BILIRUB SERPL-MCNC: 0.3 MG/DL — LOW (ref 0.4–2)
BLD GP AB SCN SERPL QL: SIGNIFICANT CHANGE UP
BUN SERPL-MCNC: 19 MG/DL — SIGNIFICANT CHANGE UP (ref 8–20)
BUN SERPL-MCNC: 25 MG/DL
BUN SERPL-MCNC: 29 MG/DL
BUN SERPL-MCNC: 32 MG/DL
CALCIUM SERPL-MCNC: 10.1 MG/DL — SIGNIFICANT CHANGE UP (ref 8.6–10.2)
CALCIUM SERPL-MCNC: 10.3 MG/DL
CALCIUM SERPL-MCNC: 11 MG/DL
CALCIUM SERPL-MCNC: 13.7 MG/DL
CAU: 18 MG/DL
CHLORIDE SERPL-SCNC: 92 MMOL/L
CHLORIDE SERPL-SCNC: 93 MMOL/L
CHLORIDE SERPL-SCNC: 95 MMOL/L — LOW (ref 98–107)
CHLORIDE SERPL-SCNC: 97 MMOL/L
CO2 SERPL-SCNC: 30 MMOL/L
CO2 SERPL-SCNC: 30 MMOL/L — HIGH (ref 22–29)
CO2 SERPL-SCNC: 32 MMOL/L
CO2 SERPL-SCNC: 37 MMOL/L
CREAT 24H UR-MCNC: 1 G/24 H
CREAT ?TM UR-MCNC: 33 MG/DL
CREAT SERPL-MCNC: 1.04 MG/DL — SIGNIFICANT CHANGE UP (ref 0.5–1.3)
CREAT SERPL-MCNC: 1.1 MG/DL
CREAT SERPL-MCNC: 1.13 MG/DL
CREAT SERPL-MCNC: 1.48 MG/DL
EOSINOPHIL # BLD AUTO: 0 K/UL — SIGNIFICANT CHANGE UP (ref 0–0.5)
EOSINOPHIL # BLD AUTO: 0.04 K/UL — SIGNIFICANT CHANGE UP (ref 0–0.5)
EOSINOPHIL # BLD AUTO: 0.1 K/UL — SIGNIFICANT CHANGE UP (ref 0–0.5)
EOSINOPHIL # BLD AUTO: 0.2 K/UL — SIGNIFICANT CHANGE UP (ref 0–0.5)
EOSINOPHIL # BLD AUTO: 0.2 K/UL — SIGNIFICANT CHANGE UP (ref 0–0.5)
EOSINOPHIL NFR BLD AUTO: 0.4 % — SIGNIFICANT CHANGE UP (ref 0–6)
EOSINOPHIL NFR BLD AUTO: 0.6 % — SIGNIFICANT CHANGE UP (ref 0–6)
EOSINOPHIL NFR BLD AUTO: 1.5 % — SIGNIFICANT CHANGE UP (ref 0–6)
EOSINOPHIL NFR BLD AUTO: 2.2 % — SIGNIFICANT CHANGE UP (ref 0–6)
EOSINOPHIL NFR BLD AUTO: 2.4 % — SIGNIFICANT CHANGE UP (ref 0–6)
GLUCOSE SERPL-MCNC: 102 MG/DL — HIGH (ref 70–99)
GLUCOSE SERPL-MCNC: 112 MG/DL
GLUCOSE SERPL-MCNC: 112 MG/DL
GLUCOSE SERPL-MCNC: 131 MG/DL
HBV CORE IGG+IGM SER QL: NONREACTIVE
HBV SURFACE AB SER QL: NONREACTIVE
HBV SURFACE AG SER QL: NONREACTIVE
HCT VFR BLD CALC: 34.4 % — LOW (ref 39–50)
HCT VFR BLD CALC: 34.6 % — LOW (ref 39–50)
HCT VFR BLD CALC: 35.7 % — LOW (ref 39–50)
HCT VFR BLD CALC: 36.8 % — LOW (ref 39–50)
HCT VFR BLD CALC: 37.8 % — LOW (ref 39–50)
HCV AB SER QL: NONREACTIVE
HCV S/CO RATIO: 0.11 S/CO
HGB BLD-MCNC: 11.2 G/DL — LOW (ref 13–17)
HGB BLD-MCNC: 11.3 G/DL — LOW (ref 13–17)
HGB BLD-MCNC: 11.4 G/DL — LOW (ref 13–17)
HGB BLD-MCNC: 11.6 G/DL — LOW (ref 13–17)
HGB BLD-MCNC: 12.1 G/DL — LOW (ref 13–17)
IMM GRANULOCYTES NFR BLD AUTO: 0.4 % — SIGNIFICANT CHANGE UP (ref 0–1.5)
INR BLD: 1.15 RATIO — SIGNIFICANT CHANGE UP (ref 0.88–1.16)
LYMPHOCYTES # BLD AUTO: 0.7 K/UL — LOW (ref 1–3.3)
LYMPHOCYTES # BLD AUTO: 0.79 K/UL — LOW (ref 1–3.3)
LYMPHOCYTES # BLD AUTO: 0.9 K/UL — LOW (ref 1–3.3)
LYMPHOCYTES # BLD AUTO: 0.9 K/UL — LOW (ref 1–3.3)
LYMPHOCYTES # BLD AUTO: 1.1 K/UL — SIGNIFICANT CHANGE UP (ref 1–3.3)
LYMPHOCYTES # BLD AUTO: 11.7 % — LOW (ref 13–44)
LYMPHOCYTES # BLD AUTO: 12.4 % — LOW (ref 13–44)
LYMPHOCYTES # BLD AUTO: 12.5 % — LOW (ref 13–44)
LYMPHOCYTES # BLD AUTO: 12.8 % — LOW (ref 13–44)
LYMPHOCYTES # BLD AUTO: 6.5 % — LOW (ref 13–44)
MAGNESIUM SERPL-MCNC: 1.6 MG/DL
MAGNESIUM SERPL-MCNC: 1.9 MG/DL
MAGNESIUM SERPL-MCNC: 2 MG/DL
MCHC RBC-ENTMCNC: 27.7 PG — SIGNIFICANT CHANGE UP (ref 27–34)
MCHC RBC-ENTMCNC: 27.8 PG — SIGNIFICANT CHANGE UP (ref 27–34)
MCHC RBC-ENTMCNC: 28.1 PG — SIGNIFICANT CHANGE UP (ref 27–34)
MCHC RBC-ENTMCNC: 28.5 PG — SIGNIFICANT CHANGE UP (ref 27–34)
MCHC RBC-ENTMCNC: 29.1 PG — SIGNIFICANT CHANGE UP (ref 27–34)
MCHC RBC-ENTMCNC: 31.6 G/DL — LOW (ref 32–36)
MCHC RBC-ENTMCNC: 31.8 G/DL — LOW (ref 32–36)
MCHC RBC-ENTMCNC: 32 GM/DL — SIGNIFICANT CHANGE UP (ref 32–36)
MCHC RBC-ENTMCNC: 32.6 G/DL — SIGNIFICANT CHANGE UP (ref 32–36)
MCHC RBC-ENTMCNC: 33 G/DL — SIGNIFICANT CHANGE UP (ref 32–36)
MCV RBC AUTO: 85.5 FL — SIGNIFICANT CHANGE UP (ref 80–100)
MCV RBC AUTO: 86.2 FL — SIGNIFICANT CHANGE UP (ref 80–100)
MCV RBC AUTO: 87.2 FL — SIGNIFICANT CHANGE UP (ref 80–100)
MCV RBC AUTO: 89 FL — SIGNIFICANT CHANGE UP (ref 80–100)
MCV RBC AUTO: 90.9 FL — SIGNIFICANT CHANGE UP (ref 80–100)
MONOCYTES # BLD AUTO: 0.66 K/UL — SIGNIFICANT CHANGE UP (ref 0–0.9)
MONOCYTES # BLD AUTO: 0.7 K/UL — SIGNIFICANT CHANGE UP (ref 0–0.9)
MONOCYTES # BLD AUTO: 0.8 K/UL — SIGNIFICANT CHANGE UP (ref 0–0.9)
MONOCYTES # BLD AUTO: 0.9 K/UL — SIGNIFICANT CHANGE UP (ref 0–0.9)
MONOCYTES # BLD AUTO: 1 K/UL — HIGH (ref 0–0.9)
MONOCYTES NFR BLD AUTO: 10.5 % — SIGNIFICANT CHANGE UP (ref 2–14)
MONOCYTES NFR BLD AUTO: 10.7 % — SIGNIFICANT CHANGE UP (ref 2–14)
MONOCYTES NFR BLD AUTO: 11.1 % — SIGNIFICANT CHANGE UP (ref 2–14)
MONOCYTES NFR BLD AUTO: 8.2 % — SIGNIFICANT CHANGE UP (ref 2–14)
MONOCYTES NFR BLD AUTO: 9.8 % — SIGNIFICANT CHANGE UP (ref 2–14)
NEUTROPHILS # BLD AUTO: 5.1 K/UL — SIGNIFICANT CHANGE UP (ref 1.8–7.4)
NEUTROPHILS # BLD AUTO: 5.19 K/UL — SIGNIFICANT CHANGE UP (ref 1.8–7.4)
NEUTROPHILS # BLD AUTO: 5.7 K/UL — SIGNIFICANT CHANGE UP (ref 1.8–7.4)
NEUTROPHILS # BLD AUTO: 6.3 K/UL — SIGNIFICANT CHANGE UP (ref 1.8–7.4)
NEUTROPHILS # BLD AUTO: 9 K/UL — HIGH (ref 1.8–7.4)
NEUTROPHILS NFR BLD AUTO: 73.1 % — SIGNIFICANT CHANGE UP (ref 43–77)
NEUTROPHILS NFR BLD AUTO: 74.3 % — SIGNIFICANT CHANGE UP (ref 43–77)
NEUTROPHILS NFR BLD AUTO: 74.5 % — SIGNIFICANT CHANGE UP (ref 43–77)
NEUTROPHILS NFR BLD AUTO: 77.1 % — HIGH (ref 43–77)
NEUTROPHILS NFR BLD AUTO: 84.4 % — HIGH (ref 43–77)
PLATELET # BLD AUTO: 310 K/UL — SIGNIFICANT CHANGE UP (ref 150–400)
PLATELET # BLD AUTO: 335 K/UL — SIGNIFICANT CHANGE UP (ref 150–400)
PLATELET # BLD AUTO: 359 K/UL — SIGNIFICANT CHANGE UP (ref 150–400)
PLATELET # BLD AUTO: 365 K/UL — SIGNIFICANT CHANGE UP (ref 150–400)
PLATELET # BLD AUTO: 374 K/UL — SIGNIFICANT CHANGE UP (ref 150–400)
POTASSIUM SERPL-MCNC: 3.8 MMOL/L — SIGNIFICANT CHANGE UP (ref 3.5–5.3)
POTASSIUM SERPL-SCNC: 3.8 MMOL/L — SIGNIFICANT CHANGE UP (ref 3.5–5.3)
POTASSIUM SERPL-SCNC: 3.9 MMOL/L
POTASSIUM SERPL-SCNC: 4 MMOL/L
POTASSIUM SERPL-SCNC: 4.2 MMOL/L
PROT ?TM UR-MCNC: 24 HR
PROT SERPL-MCNC: 6.3 G/DL
PROT SERPL-MCNC: 6.4 G/DL
PROT SERPL-MCNC: 6.6 G/DL
PROT SERPL-MCNC: 7.3 G/DL — SIGNIFICANT CHANGE UP (ref 6.6–8.7)
PROTHROM AB SERPL-ACNC: 13.3 SEC — SIGNIFICANT CHANGE UP (ref 10.6–13.6)
RBC # BLD: 3.99 M/UL — LOW (ref 4.2–5.8)
RBC # BLD: 4.04 M/UL — LOW (ref 4.2–5.8)
RBC # BLD: 4.09 M/UL — LOW (ref 4.2–5.8)
RBC # BLD: 4.14 M/UL — LOW (ref 4.2–5.8)
RBC # BLD: 4.16 M/UL — LOW (ref 4.2–5.8)
RBC # FLD: 11.6 % — SIGNIFICANT CHANGE UP (ref 10.3–14.5)
RBC # FLD: 11.6 % — SIGNIFICANT CHANGE UP (ref 10.3–14.5)
RBC # FLD: 11.8 % — SIGNIFICANT CHANGE UP (ref 10.3–14.5)
RBC # FLD: 11.9 % — SIGNIFICANT CHANGE UP (ref 10.3–14.5)
RBC # FLD: 12.3 % — SIGNIFICANT CHANGE UP (ref 10.3–14.5)
SARS-COV-2 N GENE NPH QL NAA+PROBE: NOT DETECTED
SODIUM SERPL-SCNC: 138 MMOL/L — SIGNIFICANT CHANGE UP (ref 135–145)
SODIUM SERPL-SCNC: 139 MMOL/L
SODIUM SERPL-SCNC: 140 MMOL/L
SODIUM SERPL-SCNC: 140 MMOL/L
SPECIMEN VOL 24H UR: 2925 ML
SPECIMEN VOL 24H UR: 526 MG/24 H
SURGICAL PATHOLOGY STUDY: SIGNIFICANT CHANGE UP
WBC # BLD: 10.6 K/UL — HIGH (ref 3.8–10.5)
WBC # BLD: 6.74 K/UL — SIGNIFICANT CHANGE UP (ref 3.8–10.5)
WBC # BLD: 6.9 K/UL — SIGNIFICANT CHANGE UP (ref 3.8–10.5)
WBC # BLD: 7.6 K/UL — SIGNIFICANT CHANGE UP (ref 3.8–10.5)
WBC # BLD: 8.6 K/UL — SIGNIFICANT CHANGE UP (ref 3.8–10.5)
WBC # FLD AUTO: 10.6 K/UL — HIGH (ref 3.8–10.5)
WBC # FLD AUTO: 6.74 K/UL — SIGNIFICANT CHANGE UP (ref 3.8–10.5)
WBC # FLD AUTO: 6.9 K/UL — SIGNIFICANT CHANGE UP (ref 3.8–10.5)
WBC # FLD AUTO: 7.6 K/UL — SIGNIFICANT CHANGE UP (ref 3.8–10.5)
WBC # FLD AUTO: 8.6 K/UL — SIGNIFICANT CHANGE UP (ref 3.8–10.5)

## 2020-01-01 PROCEDURE — 99215 OFFICE O/P EST HI 40 MIN: CPT

## 2020-01-01 PROCEDURE — 88305 TISSUE EXAM BY PATHOLOGIST: CPT

## 2020-01-01 PROCEDURE — 71045 X-RAY EXAM CHEST 1 VIEW: CPT | Mod: 26,77

## 2020-01-01 PROCEDURE — 99213 OFFICE O/P EST LOW 20 MIN: CPT

## 2020-01-01 PROCEDURE — 92524 BEHAVRAL QUALIT ANALYS VOICE: CPT | Mod: GN

## 2020-01-01 PROCEDURE — 85610 PROTHROMBIN TIME: CPT

## 2020-01-01 PROCEDURE — 71250 CT THORAX DX C-: CPT

## 2020-01-01 PROCEDURE — G0463: CPT

## 2020-01-01 PROCEDURE — 71260 CT THORAX DX C+: CPT | Mod: 26

## 2020-01-01 PROCEDURE — 71260 CT THORAX DX C+: CPT

## 2020-01-01 PROCEDURE — 76942 ECHO GUIDE FOR BIOPSY: CPT

## 2020-01-01 PROCEDURE — 99442: CPT | Mod: 95

## 2020-01-01 PROCEDURE — 93010 ELECTROCARDIOGRAM REPORT: CPT

## 2020-01-01 PROCEDURE — 99214 OFFICE O/P EST MOD 30 MIN: CPT | Mod: 25

## 2020-01-01 PROCEDURE — 31575 DIAGNOSTIC LARYNGOSCOPY: CPT

## 2020-01-01 PROCEDURE — 99214 OFFICE O/P EST MOD 30 MIN: CPT

## 2020-01-01 PROCEDURE — 99213 OFFICE O/P EST LOW 20 MIN: CPT | Mod: 25

## 2020-01-01 PROCEDURE — 36415 COLL VENOUS BLD VENIPUNCTURE: CPT

## 2020-01-01 PROCEDURE — 71045 X-RAY EXAM CHEST 1 VIEW: CPT | Mod: 26

## 2020-01-01 PROCEDURE — 32405: CPT | Mod: RT

## 2020-01-01 PROCEDURE — 77012 CT SCAN FOR NEEDLE BIOPSY: CPT | Mod: 26

## 2020-01-01 PROCEDURE — 77012 CT SCAN FOR NEEDLE BIOPSY: CPT

## 2020-01-01 PROCEDURE — 71045 X-RAY EXAM CHEST 1 VIEW: CPT | Mod: 26,76

## 2020-01-01 PROCEDURE — 71250 CT THORAX DX C-: CPT | Mod: 26

## 2020-01-01 PROCEDURE — 88305 TISSUE EXAM BY PATHOLOGIST: CPT | Mod: 26

## 2020-01-01 PROCEDURE — 92597 ORAL SPEECH DEVICE EVAL: CPT | Mod: GN

## 2020-01-01 PROCEDURE — 99212 OFFICE O/P EST SF 10 MIN: CPT | Mod: 95

## 2020-01-01 PROCEDURE — 99213 OFFICE O/P EST LOW 20 MIN: CPT | Mod: 95

## 2020-01-01 PROCEDURE — 71045 X-RAY EXAM CHEST 1 VIEW: CPT

## 2020-01-01 PROCEDURE — 93005 ELECTROCARDIOGRAM TRACING: CPT

## 2020-01-01 PROCEDURE — 70491 CT SOFT TISSUE NECK W/DYE: CPT | Mod: 26

## 2020-01-01 PROCEDURE — 70491 CT SOFT TISSUE NECK W/DYE: CPT

## 2020-01-01 RX ORDER — CALCITRIOL 0.25 UG/1
0.25 CAPSULE, LIQUID FILLED ORAL
Qty: 180 | Refills: 1 | Status: DISCONTINUED | COMMUNITY
Start: 2019-09-25 | End: 2020-01-01

## 2020-01-01 RX ORDER — AMLODIPINE BESYLATE 2.5 MG/1
1 TABLET ORAL
Qty: 0 | Refills: 0 | DISCHARGE

## 2020-01-01 RX ORDER — LEVOTHYROXINE SODIUM 125 MCG
1 TABLET ORAL
Qty: 0 | Refills: 0 | DISCHARGE

## 2020-01-01 RX ORDER — SODIUM CHLORIDE 9 MG/ML
3 INJECTION INTRAMUSCULAR; INTRAVENOUS; SUBCUTANEOUS ONCE
Refills: 0 | Status: DISCONTINUED | OUTPATIENT
Start: 2020-01-01 | End: 2020-01-01

## 2020-01-01 RX ORDER — CALCIUM CARBONATE 500 MG/1
500 TABLET, CHEWABLE ORAL 3 TIMES DAILY
Refills: 0 | Status: DISCONTINUED | COMMUNITY
End: 2020-01-01

## 2020-01-01 RX ORDER — LEVOTHYROXINE SODIUM 0.15 MG/1
150 TABLET ORAL
Qty: 90 | Refills: 1 | Status: ACTIVE | COMMUNITY
Start: 2020-02-29 | End: 1900-01-01

## 2020-01-01 RX ORDER — SIMVASTATIN 20 MG/1
1 TABLET, FILM COATED ORAL
Qty: 0 | Refills: 0 | DISCHARGE

## 2020-01-01 RX ORDER — ASPIRIN/CALCIUM CARB/MAGNESIUM 324 MG
1 TABLET ORAL
Qty: 0 | Refills: 0 | DISCHARGE

## 2020-01-01 RX ORDER — CALCIUM CARBONATE/VITAMIN D3 600 MG-20
TABLET ORAL
Refills: 0 | Status: DISCONTINUED | COMMUNITY
End: 2020-01-01

## 2020-01-10 ENCOUNTER — APPOINTMENT (OUTPATIENT)
Dept: OTOLARYNGOLOGY | Facility: CLINIC | Age: 75
End: 2020-01-10
Payer: MEDICARE

## 2020-01-10 VITALS
HEART RATE: 91 BPM | HEIGHT: 70 IN | SYSTOLIC BLOOD PRESSURE: 129 MMHG | OXYGEN SATURATION: 100 % | DIASTOLIC BLOOD PRESSURE: 70 MMHG

## 2020-01-10 PROCEDURE — 99214 OFFICE O/P EST MOD 30 MIN: CPT

## 2020-01-10 NOTE — PHYSICAL EXAM
[Midline] : trachea located in midline position [Normal] : orientation to person, place, and time: normal [de-identified] : post treatment changes. No nodule

## 2020-01-10 NOTE — HISTORY OF PRESENT ILLNESS
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 Feeling well. Tolerating PO diet. Good voice. No weight loss.\par Complete review of systems which was performed during a previous encounter was reviewed with the patient and there are no changes except as stated in the HPI section.\par

## 2020-01-15 ENCOUNTER — RX RENEWAL (OUTPATIENT)
Age: 75
End: 2020-01-15

## 2020-01-29 ENCOUNTER — APPOINTMENT (OUTPATIENT)
Dept: SPEECH THERAPY | Facility: CLINIC | Age: 75
End: 2020-01-29
Payer: MEDICARE

## 2020-01-29 PROCEDURE — 92597 ORAL SPEECH DEVICE EVAL: CPT | Mod: GN

## 2020-02-28 ENCOUNTER — APPOINTMENT (OUTPATIENT)
Dept: SPEECH THERAPY | Facility: CLINIC | Age: 75
End: 2020-02-28
Payer: MEDICARE

## 2020-02-28 PROCEDURE — 92507 TX SP LANG VOICE COMM INDIV: CPT | Mod: GN

## 2020-02-29 ENCOUNTER — RX RENEWAL (OUTPATIENT)
Age: 75
End: 2020-02-29

## 2020-03-04 ENCOUNTER — APPOINTMENT (OUTPATIENT)
Dept: RADIATION ONCOLOGY | Facility: CLINIC | Age: 75
End: 2020-03-04
Payer: MEDICARE

## 2020-03-04 VITALS
OXYGEN SATURATION: 98 % | BODY MASS INDEX: 24.25 KG/M2 | RESPIRATION RATE: 16 BRPM | WEIGHT: 169 LBS | TEMPERATURE: 97.9 F | HEART RATE: 86 BPM | SYSTOLIC BLOOD PRESSURE: 129 MMHG | DIASTOLIC BLOOD PRESSURE: 73 MMHG

## 2020-03-04 PROCEDURE — 99024 POSTOP FOLLOW-UP VISIT: CPT

## 2020-03-04 NOTE — REASON FOR VISIT
[Post-Treatment Evaluation] : post-treatment evaluation for [Head and Neck Cancer] : head and neck cancer [Family Member] : family member

## 2020-03-04 NOTE — ASSESSMENT
[No evidence of disease] : No evidence of disease [FreeTextEntry1] : minimal treatment related sequelae.

## 2020-03-04 NOTE — HISTORY OF PRESENT ILLNESS
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 s/p Total Laryngectomy bilateral and central compartment neck dissection, total thyroidectomy and Forearm free flap reconstruction on 9/5/2019\par Completed  6,600 cGy radiation therapy to the SupraGlottic Larynx/Neck on 12/13/2019.\par \par H e presents for post treatment evaluation. Using Tracheoesophageal voice prosthesis.\par Denies dysphagia, odynophagia, excessive mucus, xerostomia, mucositis, oropharyngeal pain. Tolerates all diet. Reports mild dysgeusia.\par He follows with Dr Dominguez (Endocrinology), and Dr Moncada.

## 2020-03-04 NOTE — LETTER CLOSING
[Sincerely yours,] : Sincerely yours, [FreeTextEntry3] : Rogers Holden MD\par Physician in Chief\par Department of Radiation Medicine\par [facility] [place]\par [facility] [place]\par \par  of Radiation Medicine\par [facility]\par at  [facility]\par \par Radiation \par [facility]par [facility] [place]\par [address]\par [place], [place] [address]\par \par Tel: [phone]\par Fax: [phone]\par

## 2020-03-04 NOTE — PHYSICAL EXAM
[Normal] : oriented to person, place and time, the affect was normal, the mood was normal and not anxious [de-identified] : mirror exam s/p laryngectomy otherwise unremarkable [de-identified] : Tracheoesophageal voice prosthesis

## 2020-03-04 NOTE — REVIEW OF SYSTEMS
[Mucositis Oral: Grade 0] : Mucositis Oral: Grade 0  [Xerostomia: Grade 0] : Xerostomia: Grade 0 [Dysgeusia: Grade 1- Altered taste but no change in diet] : Dysgeusia: Grade 1 - Altered taste but no change in diet [Negative] : Allergic/Immunologic [de-identified] : mild [FreeTextEntry4] : Tracheoesophageal voice prosthesis

## 2020-03-04 NOTE — DISEASE MANAGEMENT
[Clinical] : TNM Stage: c [SAMMY] : SAMMY [TTNM] : 4a [NTNM] : 2b [MTNM] : 0 [de-identified] : 6,600 cGy [de-identified] : SupraGlottic Larynx/Neck

## 2020-03-04 NOTE — LETTER GREETING
[Dear Doctor] : Dear Doctor, [Follow-Up] : Your patient, [unfilled] was seen in my office today for follow-up [Please see my note below.] : Please see my note below. [FreeTextEntry2] : Milton Moncada MD

## 2020-03-07 LAB
ANION GAP SERPL CALC-SCNC: 14 MMOL/L
BUN SERPL-MCNC: 21 MG/DL
CA-I SERPL-SCNC: 1.18 MMOL/L
CALCIUM SERPL-MCNC: 9.1 MG/DL
CHLORIDE SERPL-SCNC: 97 MMOL/L
CO2 SERPL-SCNC: 30 MMOL/L
CREAT SERPL-MCNC: 1.17 MG/DL
GLUCOSE SERPL-MCNC: 102 MG/DL
POTASSIUM SERPL-SCNC: 3.9 MMOL/L
SODIUM SERPL-SCNC: 141 MMOL/L
T4 FREE SERPL-MCNC: 2 NG/DL
TSH SERPL-ACNC: 1.39 UIU/ML

## 2020-03-13 NOTE — HISTORY OF PRESENT ILLNESS
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 denies changes in bowel habits.  denies tremors. denies hear/cold intolerance\par \par MODIFYING FACTORS: improved with meds, TUMS caused severe constipation\par Caltrate TID and Calcitriol 0.25 mcg 1capsule BID since surgery\par started on thyroid hormone at time of surgery,  now on LT4 112 mcg daily - adherent with dosing and admin\par \par \par \par

## 2020-03-13 NOTE — REVIEW OF SYSTEMS
[Recent Weight Gain (___ Lbs)] : recent [unfilled] ~Ulb weight gain [As Noted in HPI] : as noted in HPI [Recent Weight Loss (___ Lbs)] : no recent weight loss [Chest Pain] : no chest pain [Palpitations] : no palpitations [Shortness Of Breath] : no shortness of breath [SOB on Exertion] : no shortness of breath during exertion [Nausea] : no nausea [Vomiting] : no vomiting was observed [Abdominal Pain] : no abdominal pain [Polyuria] : no polyuria [Nocturia] : no nocturia [Joint Pain] : no joint pain [Muscle Cramps] : no muscle cramps [Myalgia] : no myalgia  [Dizziness] : no dizziness [Pain/Numbness of Digits] : no pain/numbness of digits [Depression] : no depression [Anxiety] : no anxiety [Cold Intolerance] : cold tolerant [Heat Intolerance] : heat tolerant [de-identified] :  denies perioral tingling

## 2020-03-13 NOTE — PHYSICAL EXAM
[Alert] : alert [No Acute Distress] : no acute distress [Well Nourished] : well nourished [Well Developed] : well developed [Normal Sclera/Conjunctiva] : normal sclera/conjunctiva [EOMI] : extra ocular movement intact [Well Healed Scar] : well healed scar [Normal Rate and Effort] : normal respiratory rhythm and effort [Clear to Auscultation] : lungs were clear to auscultation bilaterally [Normal Rate] : heart rate was normal  [Normal S1, S2] : normal S1 and S2 [Normal Bowel Sounds] : normal bowel sounds [Not Tender] : non-tender [Soft] : abdomen soft [Cranial Nerves Intact] : cranial nerves 2-12 were intact [Normal Reflexes] : deep tendon reflexes were 2+ and symmetric [No Tremors] : no tremors [Oriented x3] : oriented to person, place, and time [Normal Insight/Judgement] : insight and judgment were intact [Normal Affect] : the affect was normal [Normal Mood] : the mood was normal [de-identified] : negative Chvosteks sign

## 2020-05-12 NOTE — HISTORY OF PRESENT ILLNESS
[Home] : at home, [unfilled] , at the time of the visit. 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 s/p Total Laryngectomy bilateral and central compartment neck dissection, total thyroidectomy and Forearm free flap reconstruction on 9/5/2019\par Completed  6,600 cGy radiation therapy to the SupraGlottic Larynx/Neck on 12/13/2019.\par Presents for follow up. Using Tracheoesophageal voice prosthesis.\par Tolerating regular diet. Reports stable weight .Denies dysphagia, odynophagia, excessive mucus, xerostomia, mucositis, oropharyngeal pain. Tolerates all diet. Reports mild dysgeusia.\par He follows with Dr Dominguez (Endocrinology), and Dr Moncada.

## 2020-05-12 NOTE — REVIEW OF SYSTEMS
[Negative] : Allergic/Immunologic [Mucositis Oral: Grade 0] : Mucositis Oral: Grade 0  [Xerostomia: Grade 0] : Xerostomia: Grade 0 [Dysgeusia: Grade 1- Altered taste but no change in diet] : Dysgeusia: Grade 1 - Altered taste but no change in diet [de-identified] : mild [FreeTextEntry4] : Tracheoesophageal voice prosthesis

## 2020-05-12 NOTE — PHYSICAL EXAM
[de-identified] : Tracheoesophageal voice prosthesis [de-identified] :  oral cavity with scant thick clear mucous.

## 2020-05-12 NOTE — ASSESSMENT
[No evidence of disease] : No evidence of disease [FreeTextEntry1] : modest  treatment related sequelae.

## 2020-05-12 NOTE — DISEASE MANAGEMENT
[Clinical] : TNM Stage: c [SAMMY] : SAMMY [NTNM] : 2b [TTNM] : 4a [MTNM] : 0 [de-identified] : SupraGlottic Larynx/Neck [de-identified] : 6,600 cGy

## 2020-05-12 NOTE — LETTER CLOSING
[Sincerely yours,] : Sincerely yours, [FreeTextEntry3] : Rogers Holden MD\par Physician in Chief\par Department of Radiation Medicine\par [facility] [place]\par [facility] [place]\par \par  of Radiation Medicine\par [facility]\par at  \[facility]\""/[facility]\par \par Radiation \par [facility]par [facility] [place]\par [address]\par [place], [place] [address]\par \par Tel: [phone]\par Fax: [phone]\par

## 2020-05-20 NOTE — HISTORY OF PRESENT ILLNESS
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 Feeling well. Tolerating PO diet. Good voice. No weight loss. No new onset of pain. Minimal numbness of the L hand.\par Complete review of systems which was performed during a previous encounter was reviewed with the patient and there are no changes except as stated in the HPI section.\par

## 2020-07-17 NOTE — REASON FOR VISIT
[Follow - Up] : a follow-up visit [Hypothyroidism] : hypothyroidism [Hypoparathyroidism] : hypoparathyroidism

## 2020-07-17 NOTE — ASSESSMENT
[FreeTextEntry1] : 75 year old male s/p total laryngectomy for laryngeal cancer.\par 1. post surgical hypothyroidism - clinically euthyroid\par -  cont LT4 112 mcg daily\par - repeat TFTs today and 1 week before next visit \par \par 2. post surgical hypoparathyroidism - eucalcemic\par - cont calcitriol 0.25 mcg BID\par - cont caltrate 600 mg TID\par - repeat levels today and 1 week before next visit

## 2020-07-17 NOTE — REVIEW OF SYSTEMS
[As Noted in HPI] : as noted in HPI [Recent Weight Gain (___ Lbs)] : no recent weight gain [Recent Weight Loss (___ Lbs)] : no recent weight loss [Wheezing] : no wheezing [Shortness Of Breath] : no shortness of breath [Joint Pain] : no joint pain [Myalgia] : no myalgia  [Headaches] : no headaches [Depression] : no depression [Anxiety] : no anxiety

## 2020-07-17 NOTE — HISTORY OF PRESENT ILLNESS
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 denies changes in bowel habits.  denies tremors. denies hear/cold intolerance\par \par MODIFYING FACTORS: improved with meds, TUMS caused severe constipation\par Caltrate TID and Calcitriol 0.25 mcg 1capsule BID since surgery\par started on thyroid hormone at time of surgery,  now on LT4 112 mcg daily - adherent with dosing and admin\par \par \par \par

## 2020-07-17 NOTE — PHYSICAL EXAM
[Alert] : alert [Healthy Appearance] : healthy appearance [Well Nourished] : well nourished [No Acute Distress] : no acute distress [EOMI] : extra ocular movement intact [No Respiratory Distress] : no respiratory distress [No Accessory Muscle Use] : no accessory muscle use [Clear to Auscultation] : lungs were clear to auscultation bilaterally [Normal S1, S2] : normal S1 and S2 [Normal Rate] : heart rate was normal [Not Tender] : non-tender [Normal Bowel Sounds] : normal bowel sounds [Soft] : abdomen soft [Normal Gait] : normal gait [Normal Reflexes] : deep tendon reflexes were 2+ and symmetric [Cranial Nerves Intact] : cranial nerves 2-12 were intact [No Tremors] : no tremors [Oriented x3] : oriented to person, place, and time [Normal Affect] : the affect was normal [Normal Insight/Judgement] : insight and judgment were intact [Normal Mood] : the mood was normal [de-identified] : (+) TEP [de-identified] : bilateral 2+ ankle edema (chronic per pt)

## 2020-07-28 NOTE — VITALS
[Maximal Pain Intensity: 0/10] : 0/10 [80: Normal activity with effort; some signs or symptoms of disease.] : 80: Normal activity with effort; some signs or symptoms of disease.  [ECOG Performance Status: 1 - Restricted in physically strenuous activity but ambulatory and able to carry out work of a light or sedentary nature] : Performance Status: 1 - Restricted in physically strenuous activity but ambulatory and able to carry out work of a light or sedentary nature, e.g., light house work, office work [Least Pain Intensity: 0/10] : 0/10

## 2020-07-29 PROBLEM — Z98.890: Status: ACTIVE | Noted: 2019-09-27

## 2020-07-29 NOTE — HISTORY OF PRESENT ILLNESS
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 s/p Total Laryngectomy bilateral and central compartment neck dissection, total thyroidectomy and Forearm free flap reconstruction on 9/5/2019\par He Completed  6,600 cGy radiation therapy to the SupraGlottic Larynx/Neck on 12/13/2019.\par Presents for follow up. Using Tracheoesophageal voice prosthesis.\par Tolerating regular diet. Reports stable weight .Denies dysphagia, odynophagia, excessive mucus, xerostomia, mucositis, oropharyngeal pain. Tolerates all diet. Reports mild dysgeusia.\par He follows with Dr Dominguez (Endocrinology), and Dr Moncada.\par \par Scheduled for scan in August 2020

## 2020-07-29 NOTE — DISEASE MANAGEMENT
[Clinical] : TNM Stage: c [SAMMY] : SAMMY [NTNM] : 2b [TTNM] : 4a [de-identified] : 6,600 cGy [MTNM] : 0 [de-identified] : SupraGlottic Larynx/Neck

## 2020-07-29 NOTE — PHYSICAL EXAM
[Normal] : well developed, well nourished, in no acute distress [de-identified] :  oral cavity with scant thick clear mucous. ; mirror exam of hypopharynx unremarkable save for laryngectomy. [de-identified] : Tracheoesophageal voice prosthesis--using successfully. Good range of motion.

## 2020-07-29 NOTE — LETTER CLOSING
[Sincerely yours,] : Sincerely yours, [FreeTextEntry3] : Rogers Holden MD\par Physician in Chief\par Department of Radiation Medicine\par [facility] [place]\par [facility] [place]\par \par  of Radiation Medicine\par [facility]\par at  [facility]\par \par Radiation \par [facility]par [facility] [place]\par [address]\par [place], [place] [address]\par \par Tel: [phone]\par Fax: [phone]\par

## 2020-07-29 NOTE — REVIEW OF SYSTEMS
[Negative] : Allergic/Immunologic [Mucositis Oral: Grade 0] : Mucositis Oral: Grade 0  [Xerostomia: Grade 0] : Xerostomia: Grade 0 [Dysgeusia: Grade 0] : Dysgeusia: Grade 0 [de-identified] : mild [FreeTextEntry7] : swallowing reasonably well. [FreeTextEntry4] : Tracheoesophageal voice prosthesis

## 2020-07-29 NOTE — LETTER GREETING
[Dear Doctor] : Dear Doctor, [Please see my note below.] : Please see my note below. [Follow-Up] : Your patient, [unfilled] was seen in my office today for follow-up [FreeTextEntry2] : Milton Moncada MD

## 2020-08-14 NOTE — PHYSICAL EXAM
[de-identified] : post treatment changes. No nodule [Midline] : trachea located in midline position [Normal] : cranial nerves 2-12 intact

## 2020-08-14 NOTE — HISTORY OF PRESENT ILLNESS
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 Feeling well. Tolerating PO diet. Good voice. No weight loss. No new onset of pain. C/O some mucus in the throat.\par Complete review of systems which was performed during a previous encounter was reviewed with the patient and there are no changes except as stated in the HPI section.\par

## 2020-08-21 PROBLEM — R91.8 MULTIPLE LUNG NODULES ON CT: Status: ACTIVE | Noted: 2020-01-01

## 2020-09-02 NOTE — ASU PATIENT PROFILE, ADULT - LEARNING ASSESSMENT (PATIENT) ADDITIONAL COMMENTS
NP, instructed pt on pre-op instructions/teaching, tips for safer surgery, pain management scale, pre-surgical infection prevention instructions, Medical clearance pending, covid swab appt info given (9/6). Pt verbalized understanding of all instructions given by NP,

## 2020-09-02 NOTE — H&P PST ADULT - ASSESSMENT
OPIOID RISK TOOL    MEGAN EACH BOX THAT APPLIES AND ADD TOTALS AT THE END    FAMILY HISTORY OF SUBSTANCE ABUSE                 FEMALE         MALE                                                Alcohol                             [  ]1 pt          [  ]3pts                                               Illegal Drugs                     [  ]2 pts        [  ]3pts                                               Rx Drugs                           [  ]4 pts        [  ]4 pts    PERSONAL HISTORY OF SUBSTANCE ABUSE                                                                                          Alcohol                             [  ]3 pts       [  ]3 pts                                               Illegal Drugs                     [  ]4 pts        [  ]4 pts                                               Rx Drugs                           [  ]5 pts        [  ]5 pts    AGE BETWEEN 16-45 YEARS                                      [  ]1 pt         [  ]1 pt    HISTORY OF PREADOLESCENT   SEXUAL ABUSE                                                             [  ]3 pts        [  ]0pts    PSYCHOLOGICAL DISEASE                     ADD, OCD, Bipolar, Schizophrenia        [  ]2 pts         [  ]2 pts                      Depression                                               [  ]1 pt           [  ]1 pt           SCORING TOTAL   (add numbers and type here)              (*0*)                                     A score of 3 or lower indicated LOW risk for future opioid abuse  A score of 4 to 7 indicated moderate risk for future opioid abuse  A score of 8 or higher indicates a high risk for opioid abuse    CAPRINI SCORE [CLOT]    AGE RELATED RISK FACTORS                                                       MOBILITY RELATED FACTORS  [ ] Age 41-60 years                                            (1 Point)                  [ ] Bed rest                                                        (1 Point)  [x ] Age: 61-74 years                                           (2 Points)                 [ ] Plaster cast                                                   (2 Points)  [ ] Age= 75 years                                              (3 Points)                 [ ] Bed bound for more than 72 hours                 (2 Points)    DISEASE RELATED RISK FACTORS                                               GENDER SPECIFIC FACTORS  [x ] Edema in the lower extremities                       (1 Point)                  [ ] Pregnancy                                                     (1 Point)  [ ] Varicose veins                                               (1 Point)                  [ ] Post-partum < 6 weeks                                   (1 Point)             [ ] BMI > 25 Kg/m2                                            (1 Point)                  [ ] Hormonal therapy  or oral contraception          (1 Point)                 [ ] Sepsis (in the previous month)                        (1 Point)                  [ ] History of pregnancy complications                 (1 point)  [ ] Pneumonia or serious lung disease                                               [ ] Unexplained or recurrent                     (1 Point)           (in the previous month)                               (1 Point)  [ ] Abnormal pulmonary function test                     (1 Point)                 SURGERY RELATED RISK FACTORS  [ ] Acute myocardial infarction                              (1 Point)                 [ ]  Section                                             (1 Point)  [ ] Congestive heart failure (in the previous month)  (1 Point)               [x ] Minor surgery                                                  (1 Point)   [ ] Inflammatory bowel disease                             (1 Point)                 [ ] Arthroscopic surgery                                        (2 Points)  [ ] Central venous access                                      (2 Points)                [ ] General surgery lasting more than 45 minutes   (2 Points)       [ ] Stroke (in the previous month)                          (5 Points)               [ ] Elective arthroplasty                                         (5 Points)            Patient is a 74 y/o male aox3 . Patient with a PMH of total laryngectomy and thyroidectomy with a forearm flap reconstruction .tracheoesophageal voice prosthesis .Patient presents to [facility] evaluation for a Ct guided left lung biopsy with Anesthesia on 20 with IR.                                                                                                                                            HEMATOLOGY RELATED FACTORS                                                 TRAUMA RELATED RISK FACTORS  [ ] Prior episodes of VTE                                     (3 Points)                [ ] Fracture of the hip, pelvis, or leg                       (5 Points)  [ ] Positive family history for VTE                         (3 Points)                 [ ] Acute spinal cord injury (in the previous month)  (5 Points)  [ ] Prothrombin 13634 A                                     (3 Points)                 [ ] Paralysis  (less than 1 month)                             (5 Points)  [ ] Factor V Leiden                                             (3 Points)                  [ ] Multiple Trauma within 1 month                        (5 Points)  [ ] Lupus anticoagulants                                     (3 Points)                                                           [ ] Anticardiolipin antibodies                               (3 Points)                                                       [ ] High homocysteine in the blood                      (3 Points)                                             [ ] Other congenital or acquired thrombophilia      (3 Points)                                                [ ] Heparin induced thrombocytopenia                  (3 Points)                                          Total Score [   4       ]    Caprini Score 0 - 2:  Low Risk, No VTE Prophylaxis required for most patients, encourage ambulation  Caprini Score 3 - 6:  At Risk, pharmacologic VTE prophylaxis is indicated for most patients (in the absence of a contraindication)      Caprini Score Greater than or = 7:  High Risk, pharmacologic VTE prophylaxis is indicated for most patients (in the absence of a contraindication)      Patient is a 74 y/o male aox3 . Patient with a PMH of total laryngectomy and thyroidectomy with a forearm flap reconstruction .tracheoesophageal voice prosthesis .Patient presents to [facility] evaluation for a Ct guided left lung biopsy with Anesthesia on 20 with IR.     patient was educated on preop prep with pt is going for medical to Dr Perez [phone] Dr Kim [phone] OPIOID RISK TOOL    MEGAN EACH BOX THAT APPLIES AND ADD TOTALS AT THE END    FAMILY HISTORY OF SUBSTANCE ABUSE                 FEMALE         MALE                                                Alcohol                             [  ]1 pt          [  ]3pts                                               Illegal Drugs                     [  ]2 pts        [  ]3pts                                               Rx Drugs                           [  ]4 pts        [  ]4 pts    PERSONAL HISTORY OF SUBSTANCE ABUSE                                                                                          Alcohol                             [  ]3 pts       [  ]3 pts                                               Illegal Drugs                     [  ]4 pts        [  ]4 pts                                               Rx Drugs                           [  ]5 pts        [  ]5 pts    AGE BETWEEN 16-45 YEARS                                      [  ]1 pt         [  ]1 pt    HISTORY OF PREADOLESCENT   SEXUAL ABUSE                                                             [  ]3 pts        [  ]0pts    PSYCHOLOGICAL DISEASE                     ADD, OCD, Bipolar, Schizophrenia        [  ]2 pts         [  ]2 pts                      Depression                                               [  ]1 pt           [  ]1 pt           SCORING TOTAL   (add numbers and type here)              (*0*)                                     A score of 3 or lower indicated LOW risk for future opioid abuse  A score of 4 to 7 indicated moderate risk for future opioid abuse  A score of 8 or higher indicates a high risk for opioid abuse    CAPRINI SCORE [CLOT]    AGE RELATED RISK FACTORS                                                       MOBILITY RELATED FACTORS  [ ] Age 41-60 years                                            (1 Point)                  [ ] Bed rest                                                        (1 Point)  [x ] Age: 61-74 years                                           (2 Points)                 [ ] Plaster cast                                                   (2 Points)  [ ] Age= 75 years                                              (3 Points)                 [ ] Bed bound for more than 72 hours                 (2 Points)    DISEASE RELATED RISK FACTORS                                               GENDER SPECIFIC FACTORS  [x ] Edema in the lower extremities                       (1 Point)                  [ ] Pregnancy                                                     (1 Point)  [ ] Varicose veins                                               (1 Point)                  [ ] Post-partum < 6 weeks                                   (1 Point)             [ ] BMI > 25 Kg/m2                                            (1 Point)                  [ ] Hormonal therapy  or oral contraception          (1 Point)                 [ ] Sepsis (in the previous month)                        (1 Point)                  [ ] History of pregnancy complications                 (1 point)  [ ] Pneumonia or serious lung disease                                               [ ] Unexplained or recurrent                     (1 Point)           (in the previous month)                               (1 Point)  [ ] Abnormal pulmonary function test                     (1 Point)                 SURGERY RELATED RISK FACTORS  [ ] Acute myocardial infarction                              (1 Point)                 [ ]  Section                                             (1 Point)  [ ] Congestive heart failure (in the previous month)  (1 Point)               [x ] Minor surgery                                                  (1 Point)   [ ] Inflammatory bowel disease                             (1 Point)                 [ ] Arthroscopic surgery                                        (2 Points)  [ ] Central venous access                                      (2 Points)                [ ] General surgery lasting more than 45 minutes   (2 Points)       [ ] Stroke (in the previous month)                          (5 Points)               [ ] Elective arthroplasty                                         (5 Points)                                                                                                                                                   HEMATOLOGY RELATED FACTORS                                                 TRAUMA RELATED RISK FACTORS  [ ] Prior episodes of VTE                                     (3 Points)                [ ] Fracture of the hip, pelvis, or leg                       (5 Points)  [ ] Positive family history for VTE                         (3 Points)                 [ ] Acute spinal cord injury (in the previous month)  (5 Points)  [ ] Prothrombin 64559 A                                     (3 Points)                 [ ] Paralysis  (less than 1 month)                             (5 Points)  [ ] Factor V Leiden                                             (3 Points)                  [ ] Multiple Trauma within 1 month                        (5 Points)  [ ] Lupus anticoagulants                                     (3 Points)                                                           [ ] Anticardiolipin antibodies                               (3 Points)                                                       [ ] High homocysteine in the blood                      (3 Points)                                             [ ] Other congenital or acquired thrombophilia      (3 Points)                                                [ ] Heparin induced thrombocytopenia                  (3 Points)                                          Total Score [   4       ]    Caprini Score 0 - 2:  Low Risk, No VTE Prophylaxis required for most patients, encourage ambulation  Caprini Score 3 - 6:  At Risk, pharmacologic VTE prophylaxis is indicated for most patients (in the absence of a contraindication)      Caprini Score Greater than or = 7:  High Risk, pharmacologic VTE prophylaxis is indicated for most patients (in the absence of a contraindication)      Patient is a 76 y/o male aox3 . Patient with a PMH of total laryngectomy and thyroidectomy with a forearm flap reconstruction .tracheoesophageal voice prosthesis .Patient presents to [facility] fro evaluation for a Ct guided left lung biopsy with Anesthesia on 20 with IR.     patient was educated on preop prep with pt is going for medical to Dr Perez [phone] Dr Kim [phone]

## 2020-09-02 NOTE — H&P PST ADULT - NSICDXPROBLEM_GEN_ALL_CORE_FT
PROBLEM DIAGNOSES  Problem: Other nonspecific abnormal finding of lung field  Assessment and Plan: pt is having a lung biopsy pt is going medical clearance and cardiac clearance     Problem: Need for prophylactic measure  Assessment and Plan: caprini score is 4 moderate VTE risk scd ordered surgical team to assess the need for pharm proph PROBLEM DIAGNOSES  Problem: Other nonspecific abnormal finding of lung field  Assessment and Plan: pt is having a lung biopsy pt is going medical clearance with Dr [phone] cardiac clearance [phone]    Problem: Need for prophylactic measure  Assessment and Plan: caprini score is 4 moderate VTE risk scd ordered surgical team to assess the need for pharm proph

## 2020-09-05 PROBLEM — Z01.818 PREOP TESTING: Status: ACTIVE | Noted: 2020-01-01

## 2020-09-09 NOTE — PROGRESS NOTE ADULT - SUBJECTIVE AND OBJECTIVE BOX
IR Post Procedure Note    Diagnosis: RUL Lung Mass    Procedure: RUL Lung Mass Biopsy    : Kristian Dasilva MD    Contrast: None    Anesthesia: 1% Lidocaine Subcutaneous, Sedation administered by Anesthesiology    Estimated Blood Loss: Less than 10cc    Specimens: Identified, Labeled, Confirmed and Sent to Lab.     Complications: Small Pneumothorax     Anticoagulation: Resume in 24 Hours    Findings & Plan: 2 core bx of RUL lung mass/ nodule obtained w 20g biopsy needle through 19g introducer needle under CT guidance. Small PTX and no alveolar hemorrhage post procedure. Pt discharged in stable condition.      Please call Interventional Radiology with any questions, concerns, or issues.

## 2020-09-09 NOTE — ASU DISCHARGE PLAN (ADULT/PEDIATRIC) - ASU DC SPECIAL INSTRUCTIONSFT
Lung Biopsy    Discharge Instructions  - You have had a lung biopsy. Collapsed lung is a risk of lung biopsy even after discharge so if you experience any shortness of breath, chest pain, or trouble breathing please call the Interventional Radiologist on Call or call 911 to go to the ER.  - You may shower in 24 hours. No soaking or swimming until the site is completely healed.  - Keep the area covered and dry for the next 24 hours.  - Do not perform any heavy lifting for the next few days or until the site is healed.  - You may resume your normal diet.  - You may resume your normal medications however you should wait 48 hours before restarting aspirin, plavix, or blood thinners.  - It is normal to experience some pain over the site for the next few days. You may take apply ice to the area (20 minutes on, 20 minutes off) and take Tylenol for that pain. Do not take more frequently than every 6 hours and do not exceed more than 3000mg of Tylenol in a 24 hour period.    - You were given conscious sedation which may make you drowsy, therefore you need someone to stay with you until the morning following the procedure.  - Do not drive, engage in heavy lifting or strenuous activity, or drink any alcoholic beverages for the next 24 hours.   - You may resume normal activity in 24 hours.    Notify your primary physician and/or Interventional Radiology IMMEDIATELY if you experience any of the following       - Shortness of Breath       - Chest Pain       - Fever of 101F or 38C       - Chills or Rigors/ Shakes       - Swelling and/or Redness in the area around the biopsy site       - Worsening Pain       - Blood soaked bandages or worsening bleeding       - Lightheadedness and/or dizziness upon standing       - Difficulty walking    If you have a problem that you believe requires IMMEDIATE attention, please go to your NEAREST Emergency Room. If you believe your problem can safely wait until you speak to a physician, please call Interventional Radiology for any concerns.    During Normal Weekday Business Hours- You can contact the Interventional Radiology department during normal business hours via telephone.  During Evenings and Weekends- If you need to contact Interventional Radiology during off hours, do so by calling the hospital and requesting to be connected to the Interventional Radiologist on call.

## 2020-10-02 NOTE — HISTORY OF PRESENT ILLNESS
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  He is s/p total laryngectomy, bilateral and central compartment neck dissection, total thyroidectomy, TEP with Dr. Johnston and now follows with Dr. Moncada. He is following with endocrinology, , who is monitoring levothyroxine 25mcg qd.  \par  \par  [de-identified] : SH: Never smoked, but long-term secondhand smoke history. No EtOH. [de-identified] : Patient presents for f/u, one year since his last evaluation. 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  He is s/p total laryngectomy, bilateral and central compartment neck dissection, total thyroidectomy, TEP with Dr. Johnston and now follows with Dr. Moncada. He is following with endocrinology, , who is monitoring levothyroxine 25mcg qd.  He denies pain. Speaking well and working with Chloe for speech and swallow. He has seen Dr Dominguez for synthroid.  He is using a TEP for speech.  See detailed ROS below. 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

## 2020-10-09 NOTE — PHYSICAL EXAM
[Midline] : trachea located in midline position [Normal] : no rashes [de-identified] : post treatment changes. No nodule

## 2020-10-09 NOTE — HISTORY OF PRESENT ILLNESS
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 Feeling well. Tolerating PO diet. Good voice. No weight loss. No new onset of pain. C/O some mucus in the throat.\par Last scans from the chest on 10/8/20.\par Complete review of systems which was performed during a previous encounter was reviewed with the patient and there are no changes except as stated in the HPI section.\par

## 2020-10-09 NOTE — CONSULT LETTER
[Dear  ___] : Dear  [unfilled], [Courtesy Letter:] : I had the pleasure of seeing your patient, [unfilled], in my office today. [Please see my note below.] : Please see my note below. [Consult Closing:] : Thank you very much for allowing me to participate in the care of this patient.  If you have any questions, please do not hesitate to contact me. [Sincerely,] : Sincerely, [FreeTextEntry2] : Pipo Leon MD ([place], [place])  [FreeTextEntry3] : \par Milton Moncada MD, FACS\par \par Otolaryngology-Head and Neck Surgery\par [employer] [facility]\par

## 2020-10-23 PROBLEM — R73.01 IFG (IMPAIRED FASTING GLUCOSE): Status: ACTIVE | Noted: 2020-01-01

## 2020-10-23 NOTE — ASSESSMENT
[FreeTextEntry1] : 75 year old male s/p total laryngectomy for laryngeal cancer.\par 1. post surgical hypothyroidism - clinically euthyroid, TSH elevated\par - increase LT4 125 mcg daily\par - repeat TFTs 8 weeks and 1 week before next visit \par \par 2. post surgical hypoparathyroidism - eucalcemic\par - cont calcitriol 0.25 mcg BID\par - cont caltrate 600 mg TID\par - repeat levels 1 week before next visit\par - check 24 hour urine calcium\par \par 3. IFG on labs, watch diet, repeat testing and A1c 8 weeks

## 2020-10-23 NOTE — PHYSICAL EXAM
[Alert] : alert [Well Nourished] : well nourished [Healthy Appearance] : healthy appearance [No Acute Distress] : no acute distress [EOMI] : extra ocular movement intact [No Respiratory Distress] : no respiratory distress [No Accessory Muscle Use] : no accessory muscle use [Clear to Auscultation] : lungs were clear to auscultation bilaterally [Normal S1, S2] : normal S1 and S2 [Normal Rate] : heart rate was normal [Normal Bowel Sounds] : normal bowel sounds [Not Tender] : non-tender [Soft] : abdomen soft [Normal Gait] : normal gait [Cranial Nerves Intact] : cranial nerves 2-12 were intact [Normal Reflexes] : deep tendon reflexes were 2+ and symmetric [No Tremors] : no tremors [Oriented x3] : oriented to person, place, and time [Normal Affect] : the affect was normal [Normal Insight/Judgement] : insight and judgment were intact [Normal Mood] : the mood was normal [de-identified] : (+) TEP [de-identified] : bilateral 2+ ankle edema (chronic per pt)

## 2020-10-23 NOTE — HISTORY OF PRESENT ILLNESS
[FreeTextEntry1] : Interval hx - needs to keep eating, needs high calories to maintain weight due to cancer.\par \par Quality: post surgical hypothyroidism and post surgical hypoparathyroidism\par Duration/Onset: September 5, 2019 he underwent total  laryngectomy for laryngeal cancer and at that time thyroid and parathyroid glands removed\par Severity: moderate\par Associated symptoms: unable to talk s/p laryngectomy. denies changes in bowel habits.  denies tremors. denies hear/cold intolerance\par \par MODIFYING FACTORS: improved with meds, TUMS caused severe constipation\par Caltrate TID and Calcitriol 0.25 mcg 1capsule BID since surgery\par started on thyroid hormone at time of surgery,  now on LT4 112 mcg daily - adherent with dosing and admin\par \par \par \par

## 2020-10-23 NOTE — REVIEW OF SYSTEMS
[As Noted in HPI] : as noted in HPI [Recent Weight Gain (___ Lbs)] : no recent weight gain [Recent Weight Loss (___ Lbs)] : no recent weight loss [Shortness Of Breath] : no shortness of breath [Wheezing] : no wheezing [Joint Pain] : no joint pain [Myalgia] : no myalgia  [Headaches] : no headaches [Depression] : no depression [Anxiety] : no anxiety

## 2020-10-23 NOTE — REASON FOR VISIT
[Follow - Up] : a follow-up visit [Hypothyroidism] : hypothyroidism [Hypoparathyroidism] : hypoparathyroidism [Other: _____] : [unfilled]

## 2020-11-13 NOTE — PHYSICAL EXAM
[Restricted in physically strenuous activity but ambulatory and able to carry out work of a light or sedentary nature] : Status 1- Restricted in physically strenuous activity but ambulatory and able to carry out work of a light or sedentary nature, e.g., light house work, office work [Midline] : trachea located in midline position [Normal] : no rashes [de-identified] : post treatment changes.

## 2020-11-13 NOTE — HISTORY OF PRESENT ILLNESS
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  He is s/p total laryngectomy, bilateral and central compartment neck dissection, total thyroidectomy, TEP with Dr. Johnston and now follows with Dr. Moncada. He is following with endocrinology, , who is monitoring levothyroxine 25mcg qd.  \par  \par  [de-identified] : SH: Never smoked, but long-term secondhand smoke history. No EtOH. [de-identified] : Patient presents for [address] for metastatic disease in the lungs seen on CT in 8/20, biopsy confirmed on 9/9/20. The patient was diagnosed with SCCA larynx in 8/19, [id number] SCCa.  He is s/p total laryngectomy, bilateral and central compartment neck dissection, total thyroidectomy and TEP with Dr. Johnston on 9/5/19 and now follows with Dr. Moncada.  Completed adjuvant CRT on 12/13/19.  + Increased secretions.  No dysphagia. No odynophagia. No xerostomia. No fatigue. No worse than baseline chronic edema.  No rash/pruritus. No N/V/D/C. No abdominal pain. No decreased appetite. No hematuria. No headache. No arthralgia/myalgia. No fever, cough or SOB.  Had 4.5 lbs weight loss

## 2020-11-13 NOTE — RESULTS/DATA
[FreeTextEntry1] : 10/8/20 CT:  Compared to the previous examination, the three masses noted within both lungs as described above have increased in size. The left upper lobe mass appears to be causing erosive changes of the posterior left third and fourth ribs suggesting possible extension into the chest wall.\par \par 9/9/20 Right lung biopsy:\par - Squamous cell carcinoma, moderately differentiated,keratinizing.  Note: The morphology of the tumor is compatible with metastasis. 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 They are new when compared to previous exam. Exact etiology is unclear. Differential diagnoses includes metastasis and or infection.  Low-attenuation lesion in the spleen is indeterminate based on this exam. Further evaluation with ultrasound is recommended.\par \par 8/18/20 CT neck: 1. Interval total laryngectomy, partial pharyngectomy, forearm free flap reconstruction of a neopharynx, tracheotomy, total thyroidectomy, and placement of a tracheoesophageal puncture device when compared to the prior studies.\par 2. No evidence of masslike or nodular enhancement in or about the surgical sites.\par 3. Interval bilateral and central neck dissection without evidence of new cervical lymphadenopathy.\par 4. New large consolidative masses within both lung\par \par \par 9/16/19 Pathology:\par 1. Lymph nodes, right neck, levels 2, 3 and 4, neck dissection\par - 1 out of 31 lymph nodes positive for metastatic carcinoma\par - Extranodal extension is identified\par 2. Lymph nodes, right paratracheal and pretracheal, dissection\par - 14 lymph nodes negative for metastatic carcinoma\par - Unremarkable parathyroid tissue\par 3. Lymph nodes, left neck, levels 2, 3 and 4, neck dissection\par - 36 lymph nodes negative for metastatic carcinoma\par 4. Lymph nodes, left paratracheal and pretracheal, dissection\par - 13 lymph nodes negative for metastatic carcinoma\par 5. Skin, stoma, biopsy\par - Skin with solar elastosis and underlying adnexa and subdermal fat\par 6. Additional posterior cricoid mucosal true margin, biopsy\par - Squamous mucosa negative for carcinoma and dysplasia\par 7. Right inferior posterior cricoid mucosal margin, biopsy\par - Squamous mucosa with small focus of high grade dysplasia\par 8. Right lateral pyriform sinus margin, biopsy\par - Squamous mucosa negative for carcinoma and dysplasia\par 9. Right posterior cricoid inferior soft tissue, biopsy\par - Fibroadipose tissue negative for carcinoma\par 10. Larynx, total laryngectomy, thyroidectomy, partial pharyngectomy\par - Squamous cell carcinoma, keratinizing, well-to-moderately differentiated (see synoptic summary)\par - Focal high grade dysplasia/carcinoma in situ\par - Thyroid, negative for carcinoma\par 11. Additional posterior cricoid soft tissue, biopsy\par - Fibrous connective tissue, skeletal muscle, and small focus of minor salivary glands, negative for carcinoma\par 12. Additional posterior cricoid mucosal margin, biopsy\par - Squamous mucosa negative for carcinoma and dysplasia\par 13. Additional left pyriform sinus margin, biopsy\par \par 8/29/19 Pathology:\par NECK, MID ANTERIOR, US GUIDED FNA POSITIVE FOR MALIGNANT CELLS.\par Consistent with squamous cell carcinoma\par LYMPH NODE, NECK, LEVEL 3, RIGHT, US GUIDED FNA SUSPICIOUS FOR MALIGNANCY.\par \par 8/23/19 MRI Orbit Face/Neck: There is an enhancing 4.4 x 4 x 4.6 cm, (AP, TR, CC) mass involving the right aryepiglottic fold, right piriform sinus, right greater than left false and true cords, with extra lateral extension into the anterior strap muscles and thyroid with 1.1 cm subglottic extension concerning neoplasm as detailed above. \par Lymph nodes in III cervical chains, although nodes are less than 1.5 cm is in short axis, the nodes are slightly clustered and heterogeneous and enhancement, metastatic adenopathy is not excluded, please see PET/CT for full description of scottie findings.\par \par 8/22/19 PET/CT: Abnormal FDG-PET/CT scan. \par 1. Large hypermetabolic transglottic mass centered in right larynx with extension across the midline. There is extralaryngeal extension involving the bilateral strap muscles. \par 2. Asymmetric hypermetabolism in cricoarytenoid region, left greater than right, is indeterminate, possibly physiologic. \par 3 Small hypermetabolic lymph nodes in right level III and IV cervical lymph nodes are compatible with metastatic disease. A tiny left level III lymph node is indeterminate. Further evaluation may be performed with ultrasound guided percutaneous needle biopsy. \par 4. No evidence of distant disease.\par \par 8/6/19 CT Neck: 2.6 x 2.8 x 2.7 cm transglottic mass centered in the right larynx with extension across midline and into strap muscles. Histopathologic correlation is recommended. 12 x 7 mm right level 3 lymph node.

## 2020-11-13 NOTE — RESULTS/DATA
[FreeTextEntry1] : 9/9/20 Right lung biopsy:\par - Squamous cell carcinoma, moderately differentiated,keratinizing.  Note: The morphology of the tumor is compatible with metastasis. 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 They are new when compared to previous exam. Exact etiology is unclear. Differential diagnoses includes metastasis and or infection.  Low-attenuation lesion in the spleen is indeterminate based on this exam. Further evaluation with ultrasound is recommended.\par \par 8/18/20 CT neck: 1. Interval total laryngectomy, partial pharyngectomy, forearm free flap reconstruction of a neopharynx, tracheotomy, total thyroidectomy, and placement of a tracheoesophageal puncture device when compared to the prior studies.\par 2. No evidence of masslike or nodular enhancement in or about the surgical sites.\par 3. Interval bilateral and central neck dissection without evidence of new cervical lymphadenopathy.\par 4. New large consolidative masses within both lung\par \par \par 9/16/19 Pathology:\par 1. Lymph nodes, right neck, levels 2, 3 and 4, neck dissection\par - 1 out of 31 lymph nodes positive for metastatic carcinoma\par - Extranodal extension is identified\par 2. Lymph nodes, right paratracheal and pretracheal, dissection\par - 14 lymph nodes negative for metastatic carcinoma\par - Unremarkable parathyroid tissue\par 3. Lymph nodes, left neck, levels 2, 3 and 4, neck dissection\par - 36 lymph nodes negative for metastatic carcinoma\par 4. Lymph nodes, left paratracheal and pretracheal, dissection\par - 13 lymph nodes negative for metastatic carcinoma\par 5. Skin, stoma, biopsy\par - Skin with solar elastosis and underlying adnexa and subdermal fat\par 6. Additional posterior cricoid mucosal true margin, biopsy\par - Squamous mucosa negative for carcinoma and dysplasia\par 7. Right inferior posterior cricoid mucosal margin, biopsy\par - Squamous mucosa with small focus of high grade dysplasia\par 8. Right lateral pyriform sinus margin, biopsy\par - Squamous mucosa negative for carcinoma and dysplasia\par 9. Right posterior cricoid inferior soft tissue, biopsy\par - Fibroadipose tissue negative for carcinoma\par 10. Larynx, total laryngectomy, thyroidectomy, partial pharyngectomy\par - Squamous cell carcinoma, keratinizing, well-to-moderately differentiated (see synoptic summary)\par - Focal high grade dysplasia/carcinoma in situ\par - Thyroid, negative for carcinoma\par 11. Additional posterior cricoid soft tissue, biopsy\par - Fibrous connective tissue, skeletal muscle, and small focus of minor salivary glands, negative for carcinoma\par 12. Additional posterior cricoid mucosal margin, biopsy\par - Squamous mucosa negative for carcinoma and dysplasia\par 13. Additional left pyriform sinus margin, biopsy\par \par 8/29/19 Pathology:\par NECK, MID ANTERIOR, US GUIDED FNA POSITIVE FOR MALIGNANT CELLS.\par Consistent with squamous cell carcinoma\par LYMPH NODE, NECK, LEVEL 3, RIGHT, US GUIDED FNA SUSPICIOUS FOR MALIGNANCY.\par \par 8/23/19 MRI Orbit Face/Neck: There is an enhancing 4.4 x 4 x 4.6 cm, (AP, TR, CC) mass involving the right aryepiglottic fold, right piriform sinus, right greater than left false and true cords, with extra lateral extension into the anterior strap muscles and thyroid with 1.1 cm subglottic extension concerning neoplasm as detailed above. \par Lymph nodes in III cervical chains, although nodes are less than 1.5 cm is in short axis, the nodes are slightly clustered and heterogeneous and enhancement, metastatic adenopathy is not excluded, please see PET/CT for full description of scottie findings.\par \par 8/22/19 PET/CT: Abnormal FDG-PET/CT scan. \par 1. Large hypermetabolic transglottic mass centered in right larynx with extension across the midline. There is extralaryngeal extension involving the bilateral strap muscles. \par 2. Asymmetric hypermetabolism in cricoarytenoid region, left greater than right, is indeterminate, possibly physiologic. \par 3 Small hypermetabolic lymph nodes in right level III and IV cervical lymph nodes are compatible with metastatic disease. A tiny left level III lymph node is indeterminate. Further evaluation may be performed with ultrasound guided percutaneous needle biopsy. \par 4. No evidence of distant disease.\par \par 8/6/19 CT Neck: 2.6 x 2.8 x 2.7 cm transglottic mass centered in the right larynx with extension across midline and into strap muscles. Histopathologic correlation is recommended. 12 x 7 mm right level 3 lymph node.

## 2020-11-13 NOTE — HISTORY OF PRESENT ILLNESS
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  He is s/p total laryngectomy, bilateral and central compartment neck dissection, total thyroidectomy, TEP with Dr. Johnsotn and now follows with Dr. Moncada. He is following with endocrinology, , who is monitoring levothyroxine 25mcg qd.  \par  \par  [de-identified] : SH: Never smoked, but long-term secondhand smoke history. No EtOH. [de-identified] : Patient presents for C2D1 Keytruda for metastatic disease in the lungs seen on CT in 8/20, biopsy confirmed on 9/9/20. The patient was diagnosed with SCCA larynx in 8/19, [id number] SCCa.  He is s/p total laryngectomy, bilateral and central compartment neck dissection, total thyroidectomy and TEP with Dr. Johnston on 9/5/19 and now follows with Dr. Moncada.  Completed adjuvant CRT on 12/13/19.  + Increased secretions.  No dysphagia. No odynophagia. No xerostomia. No fatigue. No worse than baseline chronic edema.  No rash/pruritus. No N/V/D/C. No abdominal pain. No decreased appetite. No hematuria. No headache. No arthralgia/myalgia. No fever, cough or SOB

## 2020-11-13 NOTE — PHYSICAL EXAM
[Restricted in physically strenuous activity but ambulatory and able to carry out work of a light or sedentary nature] : Status 1- Restricted in physically strenuous activity but ambulatory and able to carry out work of a light or sedentary nature, e.g., light house work, office work [Midline] : trachea located in midline position [Normal] : no rashes [de-identified] : post treatment changes.

## 2020-11-20 PROBLEM — Z92.3 HISTORY OF RADIATION TO HEAD AND NECK REGION: Status: ACTIVE | Noted: 2020-03-04

## 2020-11-20 PROBLEM — Z90.02 HISTORY OF LARYNGECTOMY: Status: ACTIVE | Noted: 2020-01-01

## 2020-11-20 NOTE — VITALS
[Least Pain Intensity: 0/10] : 0/10 [80: Normal activity with effort; some signs or symptoms of disease.] : 80: Normal activity with effort; some signs or symptoms of disease.  [ECOG Performance Status: 1 - Restricted in physically strenuous activity but ambulatory and able to carry out work of a light or sedentary nature] : Performance Status: 1 - Restricted in physically strenuous activity but ambulatory and able to carry out work of a light or sedentary nature, e.g., light house work, office work [Maximal Pain Intensity: 4/10] : 4/10 [Pain Location: ___] : Pain Location: [unfilled] [OTC] : OTC

## 2020-11-20 NOTE — LETTER CLOSING
[Sincerely yours,] : Sincerely yours, [FreeTextEntry3] : Rogers Holden MD\par Physician in Chief\par Department of Radiation Medicine\par [facility] [place]\par [facility]\par \par  of Radiation Medicine\par [facility]\par at  [facility] [place]\par \par Radiation \par [facility]/\par [facility] [place]\par [address]\par [place], [place] [address]\par \par Tel: [phone]\par Fax: [phone]\par

## 2020-11-20 NOTE — DISEASE MANAGEMENT
[Clinical] : TNM Stage: c [SAMMY] : SAMMY [TTNM] : 4a [NTNM] : 2b [MTNM] : 0 [de-identified] : 6,600 cGy [de-identified] : SupraGlottic Larynx/Neck

## 2020-11-20 NOTE — DATA REVIEWED
[FreeTextEntry1] : 8/17/2020 CT neck soft tissue:\par \par IMPRESSION:\par \par 1. Interval total laryngectomy, partial pharyngectomy, forearm free flap reconstruction of a neopharynx, tracheotomy, total thyroidectomy, and placement of a tracheoesophageal puncture device when compared to the prior studies.\par \par 2. No evidence of masslike or nodular enhancement in or about the surgical sites.\par \par 3. Interval bilateral and central neck dissection without evidence of new cervical lymphadenopathy.\par \par 4. New large consolidative masses within both lungs, as discussed. Metastatic disease cannot be excluded. Please refer to the dedicated concurrent chest CT report for further details.\par \par CT CHEST Oct 8 , 2020\par increase in size of thoracic masses with apparent erosion of left posterior ribs.\par \par

## 2020-11-20 NOTE — REVIEW OF SYSTEMS
[Negative] : Allergic/Immunologic [Mucositis Oral: Grade 0] : Mucositis Oral: Grade 0  [Xerostomia: Grade 0] : Xerostomia: Grade 0 [Dysgeusia: Grade 1- Altered taste but no change in diet] : Dysgeusia: Grade 1 - Altered taste but no change in diet [FreeTextEntry7] : swallowing reasonably well. [de-identified] : mild [FreeTextEntry4] : Tracheoesophageal voice prosthesis

## 2020-11-20 NOTE — ASSESSMENT
[Metastatic disease without local control] : Metastatic disease without local control [FreeTextEntry1] : modest  treatment related sequelae. Radiographic progression of metastatic disease in thorax despite immunotherapy .

## 2020-11-20 NOTE — HISTORY OF PRESENT ILLNESS
[Home] : at home, [unfilled] , at the time of the visit. 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 s/p Total Laryngectomy bilateral and central compartment neck dissection, total thyroidectomy and Forearm free flap reconstruction on 9/5/2019\par \par He Completed  6,600 cGy radiation therapy to the supraGlottic Larynx/Neck on 12/13/2019.\par At last contact, patient reported he was using tracheoesophageal voice prosthesis, tolerating regular diet and weight was stable.  Denied dysphagia, odynophagia, excessive mucus, xerostomia, mucositis, oropharyngeal pain. Reported continued mild dysgeusia.\par On physical exam,  oral cavity with scant thick clear mucous.  \par \par Today he notes tolerating Keytruda well . On 3rd cycle. He does complain of increased left posterior rib pain.\par \par He follows with Dr Dominguez (Endocrinology), and Dr Moncada.\par \par Scheduled for scan in August 2020

## 2020-11-24 NOTE — HISTORY OF PRESENT ILLNESS
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  He is s/p total laryngectomy, bilateral and central compartment neck dissection, total thyroidectomy, TEP with Dr. Johnston and now follows with Dr. Moncada. He is following with endocrinology, , who is monitoring levothyroxine 25mcg qd.  \par  \par  [de-identified] : SH: Never smoked, but long-term secondhand smoke history. No EtOH. [de-identified] : Patient presents for C3D3 Keytruda for metastatic disease in the lungs seen on CT in 8/20, biopsy confirmed on 9/9/20. The patient was diagnosed with SCCA larynx in 8/19, [id number] SCCa.  He is s/p total laryngectomy, bilateral and central compartment neck dissection, total thyroidectomy and TEP with Dr. Johnston on 9/5/19 and now follows with Dr. Moncada.  Completed adjuvant CRT on 12/13/19.  + Increased secretions, worse this cycle.  No dysphagia. No odynophagia. No xerostomia. No fatigue. No worse than baseline chronic edema.  No rash/pruritus. No N/V/D/C. No abdominal pain other than intermittent chronic left lower rib pain. No decreased appetite. No hematuria. No headache. No arthralgia/myalgia. No fever or SOB

## 2020-11-24 NOTE — RESULTS/DATA
[FreeTextEntry1] : 9/9/20 Right lung biopsy:  Squamous cell carcinoma, moderately differentiated,keratinizing.  Note: The morphology of the tumor is compatible with metastasis. 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 They are new when compared to previous exam. Exact etiology is unclear. Differential diagnoses includes metastasis and or infection.  Low-attenuation lesion in the spleen is indeterminate based on this exam. Further evaluation with ultrasound is recommended.\par \par 8/18/20 CT neck: 1. Interval total laryngectomy, partial pharyngectomy, forearm free flap reconstruction of a neopharynx, tracheotomy, total thyroidectomy, and placement of a tracheoesophageal puncture device when compared to the prior studies.\par 2. No evidence of masslike or nodular enhancement in or about the surgical sites.\par 3. Interval bilateral and central neck dissection without evidence of new cervical lymphadenopathy.\par 4. New large consolidative masses within both lung\par \par \par 9/16/19 Pathology:\par 1. Lymph nodes, right neck, levels 2, 3 and 4, neck dissection\par - 1 out of 31 lymph nodes positive for metastatic carcinoma\par - Extranodal extension is identified\par 2. Lymph nodes, right paratracheal and pretracheal, dissection\par - 14 lymph nodes negative for metastatic carcinoma\par - Unremarkable parathyroid tissue\par 3. Lymph nodes, left neck, levels 2, 3 and 4, neck dissection\par - 36 lymph nodes negative for metastatic carcinoma\par 4. Lymph nodes, left paratracheal and pretracheal, dissection\par - 13 lymph nodes negative for metastatic carcinoma\par 5. Skin, stoma, biopsy\par - Skin with solar elastosis and underlying adnexa and subdermal fat\par 6. Additional posterior cricoid mucosal true margin, biopsy\par - Squamous mucosa negative for carcinoma and dysplasia\par 7. Right inferior posterior cricoid mucosal margin, biopsy\par - Squamous mucosa with small focus of high grade dysplasia\par 8. Right lateral pyriform sinus margin, biopsy\par - Squamous mucosa negative for carcinoma and dysplasia\par 9. Right posterior cricoid inferior soft tissue, biopsy\par - Fibroadipose tissue negative for carcinoma\par 10. Larynx, total laryngectomy, thyroidectomy, partial pharyngectomy\par - Squamous cell carcinoma, keratinizing, well-to-moderately differentiated (see synoptic summary)\par - Focal high grade dysplasia/carcinoma in situ\par - Thyroid, negative for carcinoma\par 11. Additional posterior cricoid soft tissue, biopsy\par - Fibrous connective tissue, skeletal muscle, and small focus of minor salivary glands, negative for carcinoma\par 12. Additional posterior cricoid mucosal margin, biopsy\par - Squamous mucosa negative for carcinoma and dysplasia\par 13. Additional left pyriform sinus margin, biopsy\par \par 8/29/19 Pathology:\par NECK, MID ANTERIOR, US GUIDED FNA POSITIVE FOR MALIGNANT CELLS.\par Consistent with squamous cell carcinoma\par LYMPH NODE, NECK, LEVEL 3, RIGHT, US GUIDED FNA SUSPICIOUS FOR MALIGNANCY.\par \par 8/23/19 MRI Orbit Face/Neck: There is an enhancing 4.4 x 4 x 4.6 cm, (AP, TR, CC) mass involving the right aryepiglottic fold, right piriform sinus, right greater than left false and true cords, with extra lateral extension into the anterior strap muscles and thyroid with 1.1 cm subglottic extension concerning neoplasm as detailed above. \par Lymph nodes in III cervical chains, although nodes are less than 1.5 cm is in short axis, the nodes are slightly clustered and heterogeneous and enhancement, metastatic adenopathy is not excluded, please see PET/CT for full description of scottie findings.\par \par 8/22/19 PET/CT: Abnormal FDG-PET/CT scan. \par 1. Large hypermetabolic transglottic mass centered in right larynx with extension across the midline. There is extralaryngeal extension involving the bilateral strap muscles. \par 2. Asymmetric hypermetabolism in cricoarytenoid region, left greater than right, is indeterminate, possibly physiologic. \par 3 Small hypermetabolic lymph nodes in right level III and IV cervical lymph nodes are compatible with metastatic disease. A tiny left level III lymph node is indeterminate. Further evaluation may be performed with ultrasound guided percutaneous needle biopsy. \par 4. No evidence of distant disease.\par \par 8/6/19 CT Neck: 2.6 x 2.8 x 2.7 cm transglottic mass centered in the right larynx with extension across midline and into strap muscles. Histopathologic correlation is recommended. 12 x 7 mm right level 3 lymph node.

## 2020-11-24 NOTE — PHYSICAL EXAM
[Restricted in physically strenuous activity but ambulatory and able to carry out work of a light or sedentary nature] : Status 1- Restricted in physically strenuous activity but ambulatory and able to carry out work of a light or sedentary nature, e.g., light house work, office work [Midline] : trachea located in midline position [Normal] : no rashes [de-identified] : post treatment changes.

## 2020-12-30 NOTE — PHYSICAL EXAM
[Restricted in physically strenuous activity but ambulatory and able to carry out work of a light or sedentary nature] : Status 1- Restricted in physically strenuous activity but ambulatory and able to carry out work of a light or sedentary nature, e.g., light house work, office work [Midline] : trachea located in midline position [Normal] : no rashes [de-identified] : post treatment changes.

## 2020-12-30 NOTE — RESULTS/DATA
[FreeTextEntry1] : 9/9/20 Right lung biopsy:  Squamous cell carcinoma, moderately differentiated,keratinizing.  Note: The morphology of the tumor is compatible with metastasis. 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 They are new when compared to previous exam. Exact etiology is unclear. Differential diagnoses includes metastasis and or infection.  Low-attenuation lesion in the spleen is indeterminate based on this exam. Further evaluation with ultrasound is recommended.\par \par 8/18/20 CT neck:  Interval total laryngectomy, partial pharyngectomy, forearm free flap reconstruction of a neopharynx, tracheotomy, total thyroidectomy, and placement of a tracheoesophageal puncture device when compared to the prior studies. No evidence of masslike or nodular enhancement in or about the surgical sites. Interval bilateral and central neck dissection without evidence of new cervical lymphadenopathy. New large consolidative masses within both lung\par \par 9/16/19 Pathology:\par 1. Lymph nodes, right neck, levels 2, 3 and 4, neck dissection\par - 1 out of 31 lymph nodes positive for metastatic carcinoma\par - Extranodal extension is identified\par 2. Lymph nodes, right paratracheal and pretracheal, dissection\par - 14 lymph nodes negative for metastatic carcinoma\par - Unremarkable parathyroid tissue\par 3. Lymph nodes, left neck, levels 2, 3 and 4, neck dissection\par - 36 lymph nodes negative for metastatic carcinoma\par 4. Lymph nodes, left paratracheal and pretracheal, dissection\par - 13 lymph nodes negative for metastatic carcinoma\par 5. Skin, stoma, biopsy\par - Skin with solar elastosis and underlying adnexa and subdermal fat\par 6. Additional posterior cricoid mucosal true margin, biopsy\par - Squamous mucosa negative for carcinoma and dysplasia\par 7. Right inferior posterior cricoid mucosal margin, biopsy\par - Squamous mucosa with small focus of high grade dysplasia\par 8. Right lateral pyriform sinus margin, biopsy\par - Squamous mucosa negative for carcinoma and dysplasia\par 9. Right posterior cricoid inferior soft tissue, biopsy\par - Fibroadipose tissue negative for carcinoma\par 10. Larynx, total laryngectomy, thyroidectomy, partial pharyngectomy\par - Squamous cell carcinoma, keratinizing, well-to-moderately differentiated (see synoptic summary)\par - Focal high grade dysplasia/carcinoma in situ\par - Thyroid, negative for carcinoma\par 11. Additional posterior cricoid soft tissue, biopsy\par - Fibrous connective tissue, skeletal muscle, and small focus of minor salivary glands, negative for carcinoma\par 12. Additional posterior cricoid mucosal margin, biopsy\par - Squamous mucosa negative for carcinoma and dysplasia\par 13. Additional left pyriform sinus margin, biopsy\par \par 8/29/19 Pathology:\par NECK, MID ANTERIOR, US GUIDED FNA POSITIVE FOR MALIGNANT CELLS.\par Consistent with squamous cell carcinoma\par LYMPH NODE, NECK, LEVEL 3, RIGHT, US GUIDED FNA SUSPICIOUS FOR MALIGNANCY.\par \par 8/23/19 MRI Orbit Face/Neck: There is an enhancing 4.4 x 4 x 4.6 cm, (AP, TR, CC) mass involving the right aryepiglottic fold, right piriform sinus, right greater than left false and true cords, with extra lateral extension into the anterior strap muscles and thyroid with 1.1 cm subglottic extension concerning neoplasm as detailed above. \par Lymph nodes in III cervical chains, although nodes are less than 1.5 cm is in short axis, the nodes are slightly clustered and heterogeneous and enhancement, metastatic adenopathy is not excluded, please see PET/CT for full description of scottie findings.\par \par 8/22/19 PET/CT: Abnormal FDG-PET/CT scan. \par 1. Large hypermetabolic transglottic mass centered in right larynx with extension across the midline. There is extralaryngeal extension involving the bilateral strap muscles. \par 2. Asymmetric hypermetabolism in cricoarytenoid region, left greater than right, is indeterminate, possibly physiologic. \par 3 Small hypermetabolic lymph nodes in right level III and IV cervical lymph nodes are compatible with metastatic disease. A tiny left level III lymph node is indeterminate. Further evaluation may be performed with ultrasound guided percutaneous needle biopsy. \par 4. No evidence of distant disease.\par \par 8/6/19 CT Neck: 2.6 x 2.8 x 2.7 cm transglottic mass centered in the right larynx with extension across midline and into strap muscles. Histopathologic correlation is recommended. 12 x 7 mm right level 3 lymph node.

## 2020-12-30 NOTE — HISTORY OF PRESENT ILLNESS
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  He is s/p total laryngectomy, bilateral and central compartment neck dissection, total thyroidectomy, TEP with Dr. Johnston and now follows with Dr. Moncada. \par  \par  [de-identified] : SH: Never smoked, but long-term secondhand smoke history. No EtOH. [de-identified] : Patient presents for C4D1 Keytruda (now on 6 week dose, 400 mg) for metastatic disease in the lungs seen on CT in 8/20, biopsy confirmed on 9/9/20. The patient was diagnosed with SCCA larynx in 8/19, [id number] SCCa.  He is s/p total laryngectomy, bilateral and central compartment neck dissection, total thyroidectomy and TEP with Dr. Johnston on 9/5/19 and now follows with Dr. Monacda.  Completed adjuvant CRT on 12/13/19.  \par + Increased secretions, unchanged.  + Diarrhea x a few episodes, now resolved. No worse than baseline chronic cough.  No dysphagia. No odynophagia. No xerostomia. No fatigue. No worse than baseline chronic edema.  No rash/pruritus. No N/V/C. No abdominal pain other than intermittent chronic left lower rib pain. No decreased appetite. No hematuria. No headache. No arthralgia/myalgia. No fever or SOB

## 2021-01-01 ENCOUNTER — APPOINTMENT (OUTPATIENT)
Dept: HEMATOLOGY ONCOLOGY | Facility: CLINIC | Age: 76
End: 2021-01-01
Payer: MEDICARE

## 2021-01-01 ENCOUNTER — APPOINTMENT (OUTPATIENT)
Dept: OTOLARYNGOLOGY | Facility: CLINIC | Age: 76
End: 2021-01-01
Payer: MEDICARE

## 2021-01-01 ENCOUNTER — APPOINTMENT (OUTPATIENT)
Dept: CT IMAGING | Facility: CLINIC | Age: 76
End: 2021-01-01
Payer: MEDICARE

## 2021-01-01 ENCOUNTER — TRANSCRIPTION ENCOUNTER (OUTPATIENT)
Age: 76
End: 2021-01-01

## 2021-01-01 ENCOUNTER — APPOINTMENT (OUTPATIENT)
Age: 76
End: 2021-01-01

## 2021-01-01 ENCOUNTER — APPOINTMENT (OUTPATIENT)
Dept: HEMATOLOGY ONCOLOGY | Facility: CLINIC | Age: 76
End: 2021-01-01

## 2021-01-01 ENCOUNTER — OUTPATIENT (OUTPATIENT)
Dept: OUTPATIENT SERVICES | Facility: HOSPITAL | Age: 76
LOS: 1 days | End: 2021-01-01
Payer: MEDICARE

## 2021-01-01 ENCOUNTER — OUTPATIENT (OUTPATIENT)
Dept: OUTPATIENT SERVICES | Facility: HOSPITAL | Age: 76
LOS: 1 days | Discharge: HOME | End: 2021-01-01

## 2021-01-01 ENCOUNTER — LABORATORY RESULT (OUTPATIENT)
Age: 76
End: 2021-01-01

## 2021-01-01 ENCOUNTER — NON-APPOINTMENT (OUTPATIENT)
Age: 76
End: 2021-01-01

## 2021-01-01 ENCOUNTER — APPOINTMENT (OUTPATIENT)
Dept: ENDOCRINOLOGY | Facility: CLINIC | Age: 76
End: 2021-01-01
Payer: MEDICARE

## 2021-01-01 VITALS
BODY MASS INDEX: 22.62 KG/M2 | HEART RATE: 98 BPM | DIASTOLIC BLOOD PRESSURE: 70 MMHG | HEIGHT: 70 IN | WEIGHT: 158 LBS | SYSTOLIC BLOOD PRESSURE: 125 MMHG

## 2021-01-01 DIAGNOSIS — E89.0 POSTPROCEDURAL HYPOTHYROIDISM: ICD-10-CM

## 2021-01-01 DIAGNOSIS — E89.2 POSTPROCEDURAL HYPOPARATHYROIDISM: ICD-10-CM

## 2021-01-01 DIAGNOSIS — Z86.39 PERSONAL HISTORY OF OTHER ENDOCRINE, NUTRITIONAL AND METABOLIC DISEASE: ICD-10-CM

## 2021-01-01 DIAGNOSIS — C32.9 MALIGNANT NEOPLASM OF LARYNX, UNSPECIFIED: ICD-10-CM

## 2021-01-01 DIAGNOSIS — C76.0 MALIGNANT NEOPLASM OF HEAD, FACE AND NECK: ICD-10-CM

## 2021-01-01 LAB
24R-OH-CALCIDIOL SERPL-MCNC: 37.7 PG/ML
25(OH)D3 SERPL-MCNC: 41.8 NG/ML
ALBUMIN MFR SERPL ELPH: 44.4 %
ALBUMIN SERPL-MCNC: 2.8 G/DL
ALBUMIN/GLOB SERPL: 0.8 RATIO
ALBUPE: 17.2 %
ALPHA1 GLOB MFR SERPL ELPH: 9.9 %
ALPHA1 GLOB SERPL ELPH-MCNC: 0.6 G/DL
ALPHA1UPE: 41.1 %
ALPHA2 GLOB MFR SERPL ELPH: 16.9 %
ALPHA2 GLOB SERPL ELPH-MCNC: 1.1 G/DL
ALPHA2UPE: 18.7 %
ANION GAP SERPL CALC-SCNC: 12 MMOL/L
B-GLOBULIN MFR SERPL ELPH: 15.1 %
B-GLOBULIN SERPL ELPH-MCNC: 1 G/DL
BETAUPE: 15.1 %
BUN SERPL-MCNC: 18 MG/DL
CA-I SERPL-SCNC: 1.3 MMOL/L
CALCIUM SERPL-MCNC: 9.3 MG/DL
CALCIUM SERPL-MCNC: 9.3 MG/DL
CHLORIDE SERPL-SCNC: 97 MMOL/L
CO2 SERPL-SCNC: 27 MMOL/L
CREAT 24H UR-MCNC: NORMAL G/24 H
CREAT SERPL-MCNC: 1.19 MG/DL
CREATININE UR (MAYO): 132 MG/DL
GAMMA GLOB FLD ELPH-MCNC: 0.9 G/DL
GAMMA GLOB MFR SERPL ELPH: 13.7 %
GAMMAUPE: 7.9 %
GLUCOSE SERPL-MCNC: 105 MG/DL
IGA 24H UR QL IFE: NORMAL
INTERPRETATION SERPL IEP-IMP: NORMAL
KAPPA LC 24H UR QL: NORMAL
MAGNESIUM SERPL-MCNC: 1.8 MG/DL
PARATHYROID HORMONE INTACT: 4 PG/ML
PHOSPHATE SERPL-MCNC: 3.1 MG/DL
POTASSIUM SERPL-SCNC: 4.1 MMOL/L
PROT PATTERN 24H UR ELPH-IMP: NORMAL
PROT SERPL-MCNC: 6.3 G/DL
PROT SERPL-MCNC: 6.3 G/DL
PROT UR-MCNC: 11 MG/DL
PROT UR-MCNC: 11 MG/DL
PTH RELATED PROT SERPL-MCNC: <2 PMOL/L
SODIUM SERPL-SCNC: 136 MMOL/L
SPECIMEN VOL 24H UR: NORMAL ML
T4 FREE SERPL-MCNC: 1.5 NG/DL
TSH SERPL-ACNC: 2.58 UIU/ML

## 2021-01-01 PROCEDURE — 99214 OFFICE O/P EST MOD 30 MIN: CPT

## 2021-01-01 PROCEDURE — 71260 CT THORAX DX C+: CPT

## 2021-01-01 PROCEDURE — 71260 CT THORAX DX C+: CPT | Mod: 26,MH

## 2021-01-01 PROCEDURE — 99215 OFFICE O/P EST HI 40 MIN: CPT | Mod: 95

## 2021-01-01 PROCEDURE — 70491 CT SOFT TISSUE NECK W/DYE: CPT | Mod: 26,MH

## 2021-01-01 PROCEDURE — 70491 CT SOFT TISSUE NECK W/DYE: CPT

## 2021-01-01 PROCEDURE — 99213 OFFICE O/P EST LOW 20 MIN: CPT | Mod: 95

## 2021-01-01 RX ORDER — LISINOPRIL 10 MG/1
10 TABLET ORAL
Refills: 0 | Status: ACTIVE | COMMUNITY

## 2021-01-01 RX ORDER — PEMBROLIZUMAB 25 MG/ML
INJECTION, SOLUTION INTRAVENOUS
Refills: 0 | Status: DISCONTINUED | COMMUNITY
End: 2021-01-01

## 2021-01-01 RX ORDER — HYDROCHLOROTHIAZIDE 25 MG/1
25 TABLET ORAL DAILY
Refills: 0 | Status: DISCONTINUED | COMMUNITY
End: 2021-01-01

## 2021-01-08 NOTE — HISTORY OF PRESENT ILLNESS
[de-identified] : Pt w large R transglottic tumor w partial airway obx and extralaryngeal spread.\par Patient is s/p TL and forearm free flap reconstruction on 9/5/19.\par [id number], stage IV SCCa.\par S/P adjuvant RT completed on 12/13/19. Feeling well. Tolerating PO diet. Good voice. No weight loss. No new onset of pain. \par Patient was found to have metastatic disease and started on immunotherapy. \par Complete review of systems which was performed during a previous encounter was reviewed with the patient and there are no changes except as stated in the HPI section.\par

## 2021-01-08 NOTE — CONSULT LETTER
[Dear  ___] : Dear  [unfilled], [Courtesy Letter:] : I had the pleasure of seeing your patient, [unfilled], in my office today. [Please see my note below.] : Please see my note below. [Consult Closing:] : Thank you very much for allowing me to participate in the care of this patient.  If you have any questions, please do not hesitate to contact me. [Sincerely,] : Sincerely, [FreeTextEntry2] : Pipo Leon MD ([place], [place])  [FreeTextEntry3] : \par Milton Moncada MD, FACS\par \par Otolaryngology-Head and Neck Surgery\par [employer] [facility]\par

## 2021-01-08 NOTE — PHYSICAL EXAM
[Midline] : trachea located in midline position [Normal] : no rashes [de-identified] : post treatment changes. No nodule

## 2021-01-16 NOTE — RESULTS/DATA
[FreeTextEntry1] : 1/10/21 CT:  6.8 x 5.5 cm mass  in the left upper lobe, extending into the lower lobe as well as through the chest wall into the subcutaneous tissues/musculature of the left back along with resultant partial destruction of the posterior left third rib and erosive changes involving the left fourth rib. An additional large mass is in the right upper lobe which is extending into the right middle lobe. The third lesion is noted in the right lower lobe. All three lesions have increased in size when compared to previous exam. \par \par 9/9/20 Right lung biopsy:  Squamous cell carcinoma, moderately differentiated,keratinizing.  Note: The morphology of the tumor is compatible with metastasis. 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 They are new when compared to previous exam. Exact etiology is unclear. Differential diagnoses includes metastasis and or infection.  Low-attenuation lesion in the spleen is indeterminate based on this exam. Further evaluation with ultrasound is recommended.\par \par 8/18/20 CT neck:  Interval total laryngectomy, partial pharyngectomy, forearm free flap reconstruction of a neopharynx, tracheotomy, total thyroidectomy, and placement of a tracheoesophageal puncture device when compared to the prior studies. No evidence of masslike or nodular enhancement in or about the surgical sites. Interval bilateral and central neck dissection without evidence of new cervical lymphadenopathy. New large consolidative masses within both lung\par \par 9/16/19 Pathology:\par 1. Lymph nodes, right neck, levels 2, 3 and 4, neck dissection\par - 1 out of 31 lymph nodes positive for metastatic carcinoma\par - Extranodal extension is identified\par 2. Lymph nodes, right paratracheal and pretracheal, dissection\par - 14 lymph nodes negative for metastatic carcinoma\par - Unremarkable parathyroid tissue\par 3. Lymph nodes, left neck, levels 2, 3 and 4, neck dissection\par - 36 lymph nodes negative for metastatic carcinoma\par 4. Lymph nodes, left paratracheal and pretracheal, dissection\par - 13 lymph nodes negative for metastatic carcinoma\par 5. Skin, stoma, biopsy\par - Skin with solar elastosis and underlying adnexa and subdermal fat\par 6. Additional posterior cricoid mucosal true margin, biopsy\par - Squamous mucosa negative for carcinoma and dysplasia\par 7. Right inferior posterior cricoid mucosal margin, biopsy\par - Squamous mucosa with small focus of high grade dysplasia\par 8. Right lateral pyriform sinus margin, biopsy\par - Squamous mucosa negative for carcinoma and dysplasia\par 9. Right posterior cricoid inferior soft tissue, biopsy\par - Fibroadipose tissue negative for carcinoma\par 10. Larynx, total laryngectomy, thyroidectomy, partial pharyngectomy\par - Squamous cell carcinoma, keratinizing, well-to-moderately differentiated (see synoptic summary)\par - Focal high grade dysplasia/carcinoma in situ\par - Thyroid, negative for carcinoma\par 11. Additional posterior cricoid soft tissue, biopsy\par - Fibrous connective tissue, skeletal muscle, and small focus of minor salivary glands, negative for carcinoma\par 12. Additional posterior cricoid mucosal margin, biopsy\par - Squamous mucosa negative for carcinoma and dysplasia\par 13. Additional left pyriform sinus margin, biopsy\par \par 8/29/19 Pathology:\par NECK, MID ANTERIOR, US GUIDED FNA POSITIVE FOR MALIGNANT CELLS.\par Consistent with squamous cell carcinoma\par LYMPH NODE, NECK, LEVEL 3, RIGHT, US GUIDED FNA SUSPICIOUS FOR MALIGNANCY.\par \par 8/23/19 MRI Orbit Face/Neck: There is an enhancing 4.4 x 4 x 4.6 cm, (AP, TR, CC) mass involving the right aryepiglottic fold, right piriform sinus, right greater than left false and true cords, with extra lateral extension into the anterior strap muscles and thyroid with 1.1 cm subglottic extension concerning neoplasm as detailed above. \par Lymph nodes in III cervical chains, although nodes are less than 1.5 cm is in short axis, the nodes are slightly clustered and heterogeneous and enhancement, metastatic adenopathy is not excluded, please see PET/CT for full description of scottie findings.\par \par 8/22/19 PET/CT: Abnormal FDG-PET/CT scan. \par 1. Large hypermetabolic transglottic mass centered in right larynx with extension across the midline. There is extralaryngeal extension involving the bilateral strap muscles. \par 2. Asymmetric hypermetabolism in cricoarytenoid region, left greater than right, is indeterminate, possibly physiologic. \par 3 Small hypermetabolic lymph nodes in right level III and IV cervical lymph nodes are compatible with metastatic disease. A tiny left level III lymph node is indeterminate. Further evaluation may be performed with ultrasound guided percutaneous needle biopsy. \par 4. No evidence of distant disease.\par \par 8/6/19 CT Neck: 2.6 x 2.8 x 2.7 cm transglottic mass centered in the right larynx with extension across midline and into strap muscles. Histopathologic correlation is recommended. 12 x 7 mm right level 3 lymph node.

## 2021-01-16 NOTE — HISTORY OF PRESENT ILLNESS
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  He is s/p total laryngectomy, bilateral and central compartment neck dissection, total thyroidectomy, TEP with Dr. Johnston and now follows with Dr. Moncada. \par  \par  [de-identified] : SH: Never smoked, but long-term secondhand smoke history. No EtOH. [de-identified] : Patient presents s/p 4 cycles1 Keytruda (now on 6 week dose, 400 mg, last tx 12/30/21) for metastatic disease in the lungs seen on CT in 8/20, biopsy confirmed on 9/9/20. The patient was diagnosed with SCCA larynx in 8/19, [id number] SCCa.  He is s/p total laryngectomy, bilateral and central compartment neck dissection, total thyroidectomy and TEP with Dr. Johnston on 9/5/19 and now follows with Dr. Moncada.  Completed adjuvant CRT on 12/13/19.  \par \par + Increased secretions, unchanged.  + Diarrhea x a few episodes, now resolved. No worse than baseline chronic cough.  No dysphagia. No odynophagia. No xerostomia. No fatigue. No worse than baseline chronic edema.  No rash/pruritus. No N/V/C. No abdominal pain other than intermittent chronic left lower rib pain. No decreased appetite. No hematuria. No headache. No arthralgia/myalgia. No fever or SOB

## 2021-01-28 NOTE — REASON FOR VISIT
[Follow-Up Visit] : a follow-up [Family Member] : family member [Home] : at home, [unfilled] , at the time of the visit. [Medical Office: ([facility])___] : at the medical office located in  [Verbal consent obtained from patient] : the patient, [unfilled] [FreeTextEntry3] : 3

## 2021-01-28 NOTE — PHYSICAL EXAM
[Restricted in physically strenuous activity but ambulatory and able to carry out work of a light or sedentary nature] : Status 1- Restricted in physically strenuous activity but ambulatory and able to carry out work of a light or sedentary nature, e.g., light house work, office work [Midline] : trachea located in midline position [Normal] : no rashes [de-identified] : post treatment changes.

## 2021-01-28 NOTE — HISTORY OF PRESENT ILLNESS
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  He is s/p total laryngectomy, bilateral and central compartment neck dissection, total thyroidectomy, TEP with Dr. Johnston and now follows with Dr. Moncada. \par  \par  [de-identified] : SH: Never smoked, but long-term secondhand smoke history. No EtOH. [de-identified] : Patient presents s/p 4 cycles1 Keytruda (now on 6 week dose, 400 mg, last tx 12/30/21) for metastatic disease in the lungs seen on CT in 8/20, biopsy confirmed on 9/9/20. The patient was diagnosed with SCCA larynx in 8/19, [id number] SCCa.  He is s/p total laryngectomy, bilateral and central compartment neck dissection, total thyroidectomy and TEP with Dr. Johnston on 9/5/19 and now follows with Dr. Moncada.  Completed adjuvant CRT on 12/13/19.  \par \par + Increased secretions, unchanged.  + Diarrhea x a few episodes, now resolved. No worse than baseline chronic cough.  No dysphagia. No odynophagia. No xerostomia. No fatigue. No worse than baseline chronic edema.  No rash/pruritus. No N/V/C. No abdominal pain other than intermittent chronic left lower rib pain. No decreased appetite. No hematuria. No headache. No arthralgia/myalgia. No fever or SOB

## 2021-01-29 PROBLEM — Z86.39 HISTORY OF HYPERCALCEMIA: Status: RESOLVED | Noted: 2020-01-01 | Resolved: 2021-01-01

## 2021-01-29 PROBLEM — E89.0 POSTOPERATIVE HYPOTHYROIDISM: Status: ACTIVE | Noted: 2019-09-27

## 2021-01-29 PROBLEM — E89.2 POST-SURGICAL HYPOPARATHYROIDISM: Status: ACTIVE | Noted: 2019-12-13

## 2021-01-29 NOTE — HISTORY OF PRESENT ILLNESS
[FreeTextEntry1] : Interval hx - /56, pulse 85, temp 98.0\par worsening cancer with bone mets and not improved with therapies - has stopped all therapies and considering hospice care\par Labs 11/2020 and 12/2020  worsening calcium levels ?due to hypercalcemia of malignancy. He received IV Zometa 11/2020 w IV hydration w improved calcium levels. \par \par Quality: post surgical hypothyroidism and post surgical hypoparathyroidism\par Duration/Onset: September 5, 2019 he underwent total  laryngectomy for laryngeal cancer and at that time thyroid and parathyroid glands removed\par Severity: moderate\par Associated symptoms: unable to talk s/p laryngectomy. denies changes in bowel habits.  denies tremors. denies hear/cold intolerance\par \par MODIFYING FACTORS: off calcium and calcitriol since hypercalcemia\par started on thyroid hormone at time of surgery,  now on LT4 150 mcg daily since 12/31/20 due to elevated TSH - adherent with dosing and admin\par \par \par \par \par

## 2021-01-29 NOTE — REASON FOR VISIT
[Home] : at home, [unfilled] , at the time of the visit. [Medical Office: ([facility])___] : at the medical office located in  [Other:____] : [unfilled] [Verbal consent obtained from patient] : the patient, [unfilled] [Follow - Up] : a follow-up visit [Hypothyroidism] : hypothyroidism [Hypoparathyroidism] : hypoparathyroidism [Other: _____] : [unfilled]

## 2021-01-29 NOTE — ASSESSMENT
[FreeTextEntry1] : 75 year old male s/p total laryngectomy for laryngeal cancer.\par 1. post surgical hypothyroidism - TSH elevated 12/2020 and LT4 dose increased\par - cont LT4 150 mcg daily\par - repeat TFTs this week\par \par 2. post surgical hypoparathyroidism - hypercalcemia now related to malignancy\par - no need for calcium/calctriol\par - s/o IV Zometa by Oncologist\par

## 2021-01-29 NOTE — PHYSICAL EXAM
[Alert] : alert [Well Nourished] : well nourished [Healthy Appearance] : healthy appearance [No Acute Distress] : no acute distress [EOMI] : extra ocular movement intact [No Respiratory Distress] : no respiratory distress [No Accessory Muscle Use] : no accessory muscle use [Normal Gait] : normal gait [Oriented x3] : oriented to person, place, and time [Normal Affect] : the affect was normal [Normal Insight/Judgement] : insight and judgment were intact [Normal Mood] : the mood was normal [de-identified] : appears thin [de-identified] : (+) TEP

## 2021-01-29 NOTE — REVIEW OF SYSTEMS
[Recent Weight Gain (___ Lbs)] : no recent weight gain [Recent Weight Loss (___ Lbs)] : recent weight loss: [unfilled] lbs [Chest Pain] : no chest pain [Shortness Of Breath] : no shortness of breath [Cough] : cough [Wheezing] : no wheezing [Nausea] : no nausea [Constipation] : constipation [Abdominal Pain] : no abdominal pain [Vomiting] : no vomiting [Diarrhea] : diarrhea [Joint Pain] : no joint pain [Myalgia] : no myalgia  [Headaches] : no headaches [Depression] : no depression [Anxiety] : no anxiety [As Noted in HPI] : as noted in HPI

## 2021-02-08 PROBLEM — C32.9 LARYNGEAL CANCER: Status: ACTIVE | Noted: 2019-08-20

## 2021-02-08 NOTE — HISTORY OF PRESENT ILLNESS
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  He is s/p total laryngectomy, bilateral and central compartment neck dissection, total thyroidectomy, TEP with Dr. Johnston and now follows with Dr. Moncada. \par  \par  [de-identified] : SH: Never smoked, but long-term secondhand smoke history. No EtOH. [de-identified] : Patient presents s/p 4 cycles1 Keytruda (now on 6 week dose, 400 mg, last tx 12/30/21) for metastatic disease in the lungs seen on CT in 8/20, biopsy confirmed on 9/9/20. The patient was diagnosed with SCCA larynx in 8/19, [id number] SCCa.  He is s/p total laryngectomy, bilateral and central compartment neck dissection, total thyroidectomy and TEP with Dr. Johnston on 9/5/19 and now follows with Dr. Moncada.  Completed adjuvant CRT on 12/13/19.  \par \par + Increased secretions, unchanged.  + Diarrhea x a few episodes, now resolved. No worse than baseline chronic cough.  No dysphagia. No odynophagia. No xerostomia. No fatigue. No worse than baseline chronic edema.  No rash/pruritus. No N/V/C. No abdominal pain other than intermittent chronic left lower rib pain. No decreased appetite. No hematuria. No headache. No arthralgia/myalgia. No fever or SOB

## 2021-02-08 NOTE — REASON FOR VISIT
[Follow-Up Visit] : a follow-up [Family Member] : family member [Home] : at home, [unfilled] , at the time of the visit. [Verbal consent obtained from patient] : the patient, [unfilled] [Medical Office: ([facility])___] : at the medical office located in

## 2021-02-08 NOTE — PHYSICAL EXAM
[de-identified] : post treatment changes. [Restricted in physically strenuous activity but ambulatory and able to carry out work of a light or sedentary nature] : Status 1- Restricted in physically strenuous activity but ambulatory and able to carry out work of a light or sedentary nature, e.g., light house work, office work [Midline] : trachea located in midline position [Normal] : no rashes

## 2021-03-11 ENCOUNTER — APPOINTMENT (OUTPATIENT)
Dept: HEMATOLOGY ONCOLOGY | Facility: CLINIC | Age: 76
End: 2021-03-11

## 2021-03-29 ENCOUNTER — APPOINTMENT (OUTPATIENT)
Dept: HEMATOLOGY ONCOLOGY | Facility: CLINIC | Age: 76
End: 2021-03-29

## 2021-04-09 ENCOUNTER — APPOINTMENT (OUTPATIENT)
Dept: OTOLARYNGOLOGY | Facility: CLINIC | Age: 76
End: 2021-04-09

## 2021-06-03 ENCOUNTER — APPOINTMENT (OUTPATIENT)
Dept: ENDOCRINOLOGY | Facility: CLINIC | Age: 76
End: 2021-06-03

## 2021-06-15 ENCOUNTER — APPOINTMENT (OUTPATIENT)
Dept: ENDOCRINOLOGY | Facility: CLINIC | Age: 76
End: 2021-06-15

## 2023-08-19 NOTE — OCCUPATIONAL THERAPY INITIAL EVALUATION ADULT - REHAB POTENTIAL, OT EVAL
"./88 (BP Location: Left arm, Cuff Size: Adult Regular)   Pulse 110   Temp 99.4  F (37.4  C) (Oral)   Resp 16   Ht 1.6 m (5' 3\")   Wt 76.4 kg (168 lb 8 oz)   SpO2 94%   BMI 29.85 kg/m       Afebrile. VSS on RA. A&Ox 4. Denies n/v/diarrhea and SOB. Complained of pain, 650 mg PRN tylenol given x1. Adequate urine output. No BM. Fentanyl patch still in place on the left arm. Assist of 2. Lab result came back, no replacement needed.     Problem: Plan of Care - These are the overarching goals to be used throughout the patient stay.    Goal: Plan of Care Review  Description: The Plan of Care Review/Shift note should be completed every shift.  The Outcome Evaluation is a brief statement about your assessment that the patient is improving, declining, or no change.  This information will be displayed automatically on your shift note.  Outcome: Progressing     Problem: Plan of Care - These are the overarching goals to be used throughout the patient stay.    Goal: Patient-Specific Goal (Individualized)  Description: You can add care plan individualizations to a care plan. Examples of Individualization might be:  \"Parent requests to be called daily at 9am for status\", \"I have a hard time hearing out of my right ear\", or \"Do not touch me to wake me up as it startles me\".  Outcome: Progressing     Problem: Hip Fracture Medical Management  Goal: Optimal Pain Control and Function  Outcome: Progressing   Goal Outcome Evaluation:                        " good, to achieve stated therapy goals
